# Patient Record
Sex: MALE | Race: WHITE | Employment: OTHER | URBAN - METROPOLITAN AREA
[De-identification: names, ages, dates, MRNs, and addresses within clinical notes are randomized per-mention and may not be internally consistent; named-entity substitution may affect disease eponyms.]

---

## 2017-01-01 ENCOUNTER — HOSPITAL ENCOUNTER (OUTPATIENT)
Dept: GENERAL RADIOLOGY | Age: 67
Discharge: HOME | End: 2017-12-20
Attending: SURGERY
Payer: MEDICARE

## 2017-01-01 ENCOUNTER — HOSPITAL ENCOUNTER (OUTPATIENT)
Dept: CT IMAGING | Age: 67
Discharge: HOME | End: 2017-11-16
Attending: INTERNAL MEDICINE
Payer: MEDICARE

## 2017-01-01 ENCOUNTER — HOSPITAL ENCOUNTER (OUTPATIENT)
Dept: INFUSION THERAPY | Age: 67
Discharge: HOME | End: 2017-09-14
Payer: MEDICARE

## 2017-01-01 ENCOUNTER — TELEPHONE (OUTPATIENT)
Dept: SURGERY | Age: 67
End: 2017-01-01

## 2017-01-01 ENCOUNTER — HOSPITAL ENCOUNTER (OUTPATIENT)
Dept: INFUSION THERAPY | Age: 67
Discharge: HOME | End: 2017-10-26
Payer: MEDICARE

## 2017-01-01 ENCOUNTER — OFFICE VISIT (OUTPATIENT)
Dept: ONCOLOGY | Age: 67
End: 2017-01-01

## 2017-01-01 ENCOUNTER — HOSPITAL ENCOUNTER (OUTPATIENT)
Dept: INFUSION THERAPY | Age: 67
Discharge: HOME | End: 2017-09-26
Payer: MEDICARE

## 2017-01-01 ENCOUNTER — HOSPITAL ENCOUNTER (OUTPATIENT)
Dept: INFUSION THERAPY | Age: 67
Discharge: HOME | End: 2017-11-06
Payer: MEDICARE

## 2017-01-01 ENCOUNTER — HOSPITAL ENCOUNTER (OUTPATIENT)
Dept: CT IMAGING | Age: 67
Discharge: HOME | End: 2017-07-17
Attending: OTOLARYNGOLOGY
Payer: MEDICARE

## 2017-01-01 ENCOUNTER — HOSPITAL ENCOUNTER (OUTPATIENT)
Dept: INFUSION THERAPY | Age: 67
End: 2017-01-01
Payer: MEDICARE

## 2017-01-01 ENCOUNTER — APPOINTMENT (OUTPATIENT)
Dept: INFUSION THERAPY | Age: 67
End: 2017-01-01
Payer: MEDICARE

## 2017-01-01 ENCOUNTER — HOSPITAL ENCOUNTER (OUTPATIENT)
Dept: INFUSION THERAPY | Age: 67
Discharge: HOME | End: 2017-10-12
Payer: MEDICARE

## 2017-01-01 ENCOUNTER — HOSPITAL ENCOUNTER (OUTPATIENT)
Dept: INFUSION THERAPY | Age: 67
Discharge: HOME | End: 2017-09-12
Payer: MEDICARE

## 2017-01-01 ENCOUNTER — APPOINTMENT (OUTPATIENT)
Dept: INFUSION THERAPY | Age: 67
End: 2017-01-01

## 2017-01-01 ENCOUNTER — HOSPITAL ENCOUNTER (OUTPATIENT)
Dept: GENERAL RADIOLOGY | Age: 67
Discharge: HOME | End: 2017-03-23
Attending: UROLOGY
Payer: MEDICARE

## 2017-01-01 ENCOUNTER — ANESTHESIA EVENT (OUTPATIENT)
Dept: SURGERY | Age: 67
DRG: 330 | End: 2017-01-01
Payer: MEDICARE

## 2017-01-01 ENCOUNTER — HOSPITAL ENCOUNTER (OUTPATIENT)
Dept: INFUSION THERAPY | Age: 67
Discharge: HOME | End: 2017-09-25
Payer: MEDICARE

## 2017-01-01 ENCOUNTER — DOCUMENTATION ONLY (OUTPATIENT)
Dept: SURGERY | Age: 67
End: 2017-01-01

## 2017-01-01 ENCOUNTER — APPOINTMENT (OUTPATIENT)
Dept: CT IMAGING | Age: 67
DRG: 330 | End: 2017-01-01
Attending: EMERGENCY MEDICINE
Payer: MEDICARE

## 2017-01-01 ENCOUNTER — TELEPHONE (OUTPATIENT)
Dept: CASE MANAGEMENT | Age: 67
End: 2017-01-01

## 2017-01-01 ENCOUNTER — HOSPITAL ENCOUNTER (OUTPATIENT)
Dept: INFUSION THERAPY | Age: 67
Discharge: HOME | End: 2017-09-11
Payer: MEDICARE

## 2017-01-01 ENCOUNTER — HOSPITAL ENCOUNTER (OUTPATIENT)
Dept: INFUSION THERAPY | Age: 67
Discharge: HOME | End: 2017-09-01
Payer: MEDICARE

## 2017-01-01 ENCOUNTER — HOSPITAL ENCOUNTER (EMERGENCY)
Age: 67
Discharge: HOME | End: 2017-08-03
Attending: EMERGENCY MEDICINE | Admitting: EMERGENCY MEDICINE
Payer: MEDICARE

## 2017-01-01 ENCOUNTER — APPOINTMENT (OUTPATIENT)
Dept: GENERAL RADIOLOGY | Age: 67
DRG: 330 | End: 2017-01-01
Attending: SURGERY
Payer: MEDICARE

## 2017-01-01 ENCOUNTER — HOSPITAL ENCOUNTER (OUTPATIENT)
Dept: INTERVENTIONAL RADIOLOGY/VASCULAR | Age: 67
Discharge: HOME | End: 2017-08-25
Attending: INTERNAL MEDICINE | Admitting: INTERNAL MEDICINE
Payer: MEDICARE

## 2017-01-01 ENCOUNTER — HOSPITAL ENCOUNTER (EMERGENCY)
Age: 67
Discharge: HOME | End: 2017-04-13
Attending: EMERGENCY MEDICINE | Admitting: EMERGENCY MEDICINE
Payer: MEDICARE

## 2017-01-01 ENCOUNTER — NURSE NAVIGATOR (OUTPATIENT)
Dept: CASE MANAGEMENT | Age: 67
End: 2017-01-01

## 2017-01-01 ENCOUNTER — HOSPITAL ENCOUNTER (INPATIENT)
Age: 67
LOS: 1 days | Discharge: HOME | DRG: 708 | End: 2017-04-06
Attending: UROLOGY | Admitting: UROLOGY
Payer: MEDICARE

## 2017-01-01 ENCOUNTER — OFFICE VISIT (OUTPATIENT)
Dept: SURGERY | Age: 67
End: 2017-01-01

## 2017-01-01 ENCOUNTER — HOSPITAL ENCOUNTER (INPATIENT)
Age: 67
LOS: 7 days | Discharge: HOME | DRG: 330 | End: 2017-08-01
Attending: EMERGENCY MEDICINE | Admitting: SURGERY
Payer: MEDICARE

## 2017-01-01 ENCOUNTER — HOSPITAL ENCOUNTER (OUTPATIENT)
Dept: INFUSION THERAPY | Age: 67
Discharge: HOME | End: 2017-10-09
Payer: MEDICARE

## 2017-01-01 ENCOUNTER — ANESTHESIA (OUTPATIENT)
Dept: SURGERY | Age: 67
DRG: 330 | End: 2017-01-01
Payer: MEDICARE

## 2017-01-01 ENCOUNTER — HOSPITAL ENCOUNTER (OUTPATIENT)
Dept: CT IMAGING | Age: 67
Discharge: HOME | End: 2017-03-16
Attending: SURGERY
Payer: MEDICARE

## 2017-01-01 ENCOUNTER — HOSPITAL ENCOUNTER (OUTPATIENT)
Dept: INFUSION THERAPY | Age: 67
Discharge: HOME | End: 2017-10-24
Payer: MEDICARE

## 2017-01-01 ENCOUNTER — ANESTHESIA (OUTPATIENT)
Dept: SURGERY | Age: 67
DRG: 406 | End: 2017-01-01
Payer: MEDICARE

## 2017-01-01 ENCOUNTER — TELEPHONE (OUTPATIENT)
Dept: ONCOLOGY | Age: 67
End: 2017-01-01

## 2017-01-01 ENCOUNTER — APPOINTMENT (OUTPATIENT)
Dept: CT IMAGING | Age: 67
End: 2017-01-01
Attending: INTERNAL MEDICINE
Payer: MEDICARE

## 2017-01-01 ENCOUNTER — APPOINTMENT (OUTPATIENT)
Dept: CT IMAGING | Age: 67
End: 2017-01-01
Attending: EMERGENCY MEDICINE
Payer: MEDICARE

## 2017-01-01 ENCOUNTER — HOSPITAL ENCOUNTER (OUTPATIENT)
Dept: INFUSION THERAPY | Age: 67
Discharge: HOME | End: 2017-10-23
Payer: MEDICARE

## 2017-01-01 ENCOUNTER — HOSPITAL ENCOUNTER (OUTPATIENT)
Dept: INFUSION THERAPY | Age: 67
Discharge: HOME | End: 2017-11-09
Payer: MEDICARE

## 2017-01-01 ENCOUNTER — ANESTHESIA EVENT (OUTPATIENT)
Dept: SURGERY | Age: 67
DRG: 708 | End: 2017-01-01
Payer: MEDICARE

## 2017-01-01 ENCOUNTER — HOSPITAL ENCOUNTER (INPATIENT)
Age: 67
LOS: 1 days | Discharge: HOME | DRG: 406 | End: 2017-12-29
Attending: SURGERY | Admitting: SURGERY
Payer: MEDICARE

## 2017-01-01 ENCOUNTER — ANESTHESIA (OUTPATIENT)
Dept: SURGERY | Age: 67
DRG: 708 | End: 2017-01-01
Payer: MEDICARE

## 2017-01-01 ENCOUNTER — APPOINTMENT (OUTPATIENT)
Dept: GENERAL RADIOLOGY | Age: 67
DRG: 330 | End: 2017-01-01
Attending: EMERGENCY MEDICINE
Payer: MEDICARE

## 2017-01-01 ENCOUNTER — HOSPITAL ENCOUNTER (OUTPATIENT)
Dept: PREADMISSION TESTING | Age: 67
Discharge: HOME | End: 2017-03-23
Attending: UROLOGY
Payer: MEDICARE

## 2017-01-01 ENCOUNTER — HOSPITAL ENCOUNTER (OUTPATIENT)
Dept: INFUSION THERAPY | Age: 67
Discharge: HOME | End: 2017-08-30
Payer: MEDICARE

## 2017-01-01 ENCOUNTER — HOSPITAL ENCOUNTER (OUTPATIENT)
Dept: INFUSION THERAPY | Age: 67
Discharge: HOME | End: 2017-10-10
Payer: MEDICARE

## 2017-01-01 ENCOUNTER — ANESTHESIA EVENT (OUTPATIENT)
Dept: SURGERY | Age: 67
DRG: 406 | End: 2017-01-01
Payer: MEDICARE

## 2017-01-01 ENCOUNTER — HOSPITAL ENCOUNTER (OUTPATIENT)
Dept: INFUSION THERAPY | Age: 67
Discharge: HOME | End: 2017-09-28
Payer: MEDICARE

## 2017-01-01 ENCOUNTER — HOSPITAL ENCOUNTER (OUTPATIENT)
Dept: INFUSION THERAPY | Age: 67
Discharge: HOME | End: 2017-11-07
Payer: MEDICARE

## 2017-01-01 ENCOUNTER — HOSPITAL ENCOUNTER (OUTPATIENT)
Dept: PREADMISSION TESTING | Age: 67
Discharge: HOME | End: 2017-12-20
Attending: SURGERY
Payer: MEDICARE

## 2017-01-01 VITALS
DIASTOLIC BLOOD PRESSURE: 83 MMHG | RESPIRATION RATE: 18 BRPM | TEMPERATURE: 98.1 F | HEART RATE: 85 BPM | SYSTOLIC BLOOD PRESSURE: 126 MMHG | BODY MASS INDEX: 28.52 KG/M2 | WEIGHT: 198.8 LBS

## 2017-01-01 VITALS
RESPIRATION RATE: 18 BRPM | WEIGHT: 206.4 LBS | DIASTOLIC BLOOD PRESSURE: 78 MMHG | SYSTOLIC BLOOD PRESSURE: 130 MMHG | HEART RATE: 85 BPM | OXYGEN SATURATION: 94 % | BODY MASS INDEX: 29.62 KG/M2 | TEMPERATURE: 98.1 F

## 2017-01-01 VITALS
DIASTOLIC BLOOD PRESSURE: 74 MMHG | TEMPERATURE: 97.6 F | SYSTOLIC BLOOD PRESSURE: 123 MMHG | RESPIRATION RATE: 18 BRPM | HEART RATE: 60 BPM

## 2017-01-01 VITALS
OXYGEN SATURATION: 93 % | DIASTOLIC BLOOD PRESSURE: 82 MMHG | SYSTOLIC BLOOD PRESSURE: 135 MMHG | HEIGHT: 70 IN | WEIGHT: 204.81 LBS | TEMPERATURE: 97.5 F | BODY MASS INDEX: 29.32 KG/M2 | RESPIRATION RATE: 18 BRPM | HEART RATE: 78 BPM

## 2017-01-01 VITALS
BODY MASS INDEX: 28.77 KG/M2 | HEIGHT: 70 IN | HEART RATE: 63 BPM | SYSTOLIC BLOOD PRESSURE: 157 MMHG | WEIGHT: 201 LBS | RESPIRATION RATE: 20 BRPM | DIASTOLIC BLOOD PRESSURE: 82 MMHG | OXYGEN SATURATION: 96 % | TEMPERATURE: 98 F

## 2017-01-01 VITALS
HEART RATE: 77 BPM | WEIGHT: 197 LBS | OXYGEN SATURATION: 96 % | TEMPERATURE: 97.8 F | SYSTOLIC BLOOD PRESSURE: 108 MMHG | HEIGHT: 70 IN | BODY MASS INDEX: 28.2 KG/M2 | RESPIRATION RATE: 20 BRPM | DIASTOLIC BLOOD PRESSURE: 71 MMHG

## 2017-01-01 VITALS
DIASTOLIC BLOOD PRESSURE: 64 MMHG | BODY MASS INDEX: 29.2 KG/M2 | HEIGHT: 70 IN | SYSTOLIC BLOOD PRESSURE: 125 MMHG | OXYGEN SATURATION: 92 % | HEART RATE: 80 BPM | WEIGHT: 204 LBS

## 2017-01-01 VITALS
RESPIRATION RATE: 18 BRPM | BODY MASS INDEX: 30.58 KG/M2 | HEIGHT: 70 IN | OXYGEN SATURATION: 95 % | WEIGHT: 213.63 LBS | DIASTOLIC BLOOD PRESSURE: 61 MMHG | SYSTOLIC BLOOD PRESSURE: 132 MMHG | TEMPERATURE: 98 F | HEART RATE: 67 BPM

## 2017-01-01 VITALS
HEART RATE: 70 BPM | SYSTOLIC BLOOD PRESSURE: 107 MMHG | OXYGEN SATURATION: 96 % | RESPIRATION RATE: 18 BRPM | DIASTOLIC BLOOD PRESSURE: 70 MMHG | TEMPERATURE: 98 F

## 2017-01-01 VITALS
DIASTOLIC BLOOD PRESSURE: 73 MMHG | SYSTOLIC BLOOD PRESSURE: 115 MMHG | OXYGEN SATURATION: 97 % | TEMPERATURE: 98.1 F | RESPIRATION RATE: 18 BRPM | HEART RATE: 66 BPM

## 2017-01-01 VITALS
RESPIRATION RATE: 20 BRPM | HEART RATE: 69 BPM | TEMPERATURE: 97.7 F | HEIGHT: 70 IN | OXYGEN SATURATION: 97 % | WEIGHT: 197.4 LBS | SYSTOLIC BLOOD PRESSURE: 127 MMHG | DIASTOLIC BLOOD PRESSURE: 79 MMHG | BODY MASS INDEX: 28.26 KG/M2

## 2017-01-01 VITALS
SYSTOLIC BLOOD PRESSURE: 134 MMHG | RESPIRATION RATE: 18 BRPM | HEART RATE: 72 BPM | DIASTOLIC BLOOD PRESSURE: 84 MMHG | TEMPERATURE: 98.6 F | OXYGEN SATURATION: 97 %

## 2017-01-01 VITALS
TEMPERATURE: 98.7 F | HEIGHT: 69 IN | RESPIRATION RATE: 18 BRPM | DIASTOLIC BLOOD PRESSURE: 84 MMHG | BODY MASS INDEX: 28.97 KG/M2 | SYSTOLIC BLOOD PRESSURE: 146 MMHG | HEART RATE: 83 BPM | OXYGEN SATURATION: 95 % | WEIGHT: 195.6 LBS

## 2017-01-01 VITALS
BODY MASS INDEX: 29.48 KG/M2 | RESPIRATION RATE: 16 BRPM | OXYGEN SATURATION: 95 % | TEMPERATURE: 97.5 F | SYSTOLIC BLOOD PRESSURE: 150 MMHG | WEIGHT: 205.91 LBS | HEART RATE: 74 BPM | DIASTOLIC BLOOD PRESSURE: 95 MMHG | HEIGHT: 70 IN

## 2017-01-01 VITALS
SYSTOLIC BLOOD PRESSURE: 110 MMHG | DIASTOLIC BLOOD PRESSURE: 74 MMHG | TEMPERATURE: 97.7 F | RESPIRATION RATE: 16 BRPM | HEART RATE: 79 BPM | OXYGEN SATURATION: 94 % | HEIGHT: 70 IN | WEIGHT: 205.4 LBS | BODY MASS INDEX: 29.41 KG/M2

## 2017-01-01 VITALS
RESPIRATION RATE: 18 BRPM | DIASTOLIC BLOOD PRESSURE: 80 MMHG | TEMPERATURE: 98.6 F | OXYGEN SATURATION: 95 % | SYSTOLIC BLOOD PRESSURE: 136 MMHG | HEART RATE: 85 BPM | BODY MASS INDEX: 29.25 KG/M2 | HEIGHT: 69 IN | WEIGHT: 197.5 LBS

## 2017-01-01 VITALS
DIASTOLIC BLOOD PRESSURE: 88 MMHG | TEMPERATURE: 98.3 F | OXYGEN SATURATION: 98 % | HEART RATE: 77 BPM | HEIGHT: 70 IN | BODY MASS INDEX: 28.75 KG/M2 | SYSTOLIC BLOOD PRESSURE: 146 MMHG | RESPIRATION RATE: 18 BRPM | WEIGHT: 200.8 LBS

## 2017-01-01 VITALS
HEIGHT: 70 IN | BODY MASS INDEX: 29.55 KG/M2 | DIASTOLIC BLOOD PRESSURE: 69 MMHG | DIASTOLIC BLOOD PRESSURE: 85 MMHG | OXYGEN SATURATION: 94 % | WEIGHT: 201.8 LBS | SYSTOLIC BLOOD PRESSURE: 139 MMHG | OXYGEN SATURATION: 95 % | BODY MASS INDEX: 28.89 KG/M2 | TEMPERATURE: 98.1 F | RESPIRATION RATE: 16 BRPM | HEART RATE: 73 BPM | RESPIRATION RATE: 18 BRPM | TEMPERATURE: 98.6 F | SYSTOLIC BLOOD PRESSURE: 139 MMHG | WEIGHT: 206.4 LBS | HEART RATE: 73 BPM | HEIGHT: 70 IN

## 2017-01-01 VITALS
HEIGHT: 70 IN | BODY MASS INDEX: 32.13 KG/M2 | OXYGEN SATURATION: 88 % | TEMPERATURE: 98.4 F | DIASTOLIC BLOOD PRESSURE: 83 MMHG | RESPIRATION RATE: 20 BRPM | WEIGHT: 224.4 LBS | HEART RATE: 92 BPM | SYSTOLIC BLOOD PRESSURE: 137 MMHG

## 2017-01-01 VITALS
WEIGHT: 218.26 LBS | HEART RATE: 79 BPM | TEMPERATURE: 98.4 F | OXYGEN SATURATION: 100 % | HEIGHT: 70 IN | DIASTOLIC BLOOD PRESSURE: 65 MMHG | RESPIRATION RATE: 18 BRPM | SYSTOLIC BLOOD PRESSURE: 150 MMHG | BODY MASS INDEX: 31.25 KG/M2

## 2017-01-01 VITALS
HEART RATE: 73 BPM | HEIGHT: 70 IN | OXYGEN SATURATION: 95 % | BODY MASS INDEX: 28.4 KG/M2 | RESPIRATION RATE: 18 BRPM | DIASTOLIC BLOOD PRESSURE: 82 MMHG | SYSTOLIC BLOOD PRESSURE: 175 MMHG | TEMPERATURE: 97.7 F | WEIGHT: 198.4 LBS

## 2017-01-01 VITALS
HEART RATE: 94 BPM | HEIGHT: 70 IN | DIASTOLIC BLOOD PRESSURE: 82 MMHG | BODY MASS INDEX: 32.11 KG/M2 | OXYGEN SATURATION: 94 % | SYSTOLIC BLOOD PRESSURE: 154 MMHG | RESPIRATION RATE: 16 BRPM | TEMPERATURE: 98 F | WEIGHT: 224.3 LBS

## 2017-01-01 VITALS
WEIGHT: 197.2 LBS | BODY MASS INDEX: 28.3 KG/M2 | TEMPERATURE: 98 F | DIASTOLIC BLOOD PRESSURE: 87 MMHG | SYSTOLIC BLOOD PRESSURE: 148 MMHG | HEART RATE: 89 BPM | RESPIRATION RATE: 18 BRPM

## 2017-01-01 VITALS
HEART RATE: 68 BPM | OXYGEN SATURATION: 97 % | WEIGHT: 197 LBS | RESPIRATION RATE: 18 BRPM | DIASTOLIC BLOOD PRESSURE: 81 MMHG | HEIGHT: 68 IN | TEMPERATURE: 98.5 F | SYSTOLIC BLOOD PRESSURE: 132 MMHG | BODY MASS INDEX: 29.86 KG/M2

## 2017-01-01 VITALS
BODY MASS INDEX: 29.09 KG/M2 | TEMPERATURE: 97.8 F | RESPIRATION RATE: 18 BRPM | WEIGHT: 196.43 LBS | HEART RATE: 75 BPM | OXYGEN SATURATION: 95 % | SYSTOLIC BLOOD PRESSURE: 170 MMHG | HEIGHT: 69 IN | DIASTOLIC BLOOD PRESSURE: 83 MMHG

## 2017-01-01 VITALS
HEIGHT: 70 IN | DIASTOLIC BLOOD PRESSURE: 75 MMHG | SYSTOLIC BLOOD PRESSURE: 116 MMHG | WEIGHT: 209.2 LBS | TEMPERATURE: 98.3 F | BODY MASS INDEX: 29.95 KG/M2 | HEART RATE: 90 BPM | OXYGEN SATURATION: 94 % | RESPIRATION RATE: 16 BRPM

## 2017-01-01 VITALS
DIASTOLIC BLOOD PRESSURE: 77 MMHG | TEMPERATURE: 97.9 F | SYSTOLIC BLOOD PRESSURE: 124 MMHG | HEART RATE: 89 BPM | OXYGEN SATURATION: 93 % | RESPIRATION RATE: 18 BRPM

## 2017-01-01 VITALS
WEIGHT: 201.8 LBS | BODY MASS INDEX: 28.96 KG/M2 | RESPIRATION RATE: 18 BRPM | HEART RATE: 81 BPM | SYSTOLIC BLOOD PRESSURE: 120 MMHG | OXYGEN SATURATION: 96 % | TEMPERATURE: 97.9 F | DIASTOLIC BLOOD PRESSURE: 73 MMHG

## 2017-01-01 VITALS
RESPIRATION RATE: 18 BRPM | SYSTOLIC BLOOD PRESSURE: 138 MMHG | BODY MASS INDEX: 28.23 KG/M2 | HEART RATE: 73 BPM | WEIGHT: 197.2 LBS | HEIGHT: 70 IN | DIASTOLIC BLOOD PRESSURE: 77 MMHG | OXYGEN SATURATION: 94 %

## 2017-01-01 VITALS — TEMPERATURE: 98 F | HEART RATE: 73 BPM | DIASTOLIC BLOOD PRESSURE: 85 MMHG | SYSTOLIC BLOOD PRESSURE: 138 MMHG

## 2017-01-01 VITALS
HEART RATE: 63 BPM | OXYGEN SATURATION: 97 % | DIASTOLIC BLOOD PRESSURE: 76 MMHG | RESPIRATION RATE: 18 BRPM | SYSTOLIC BLOOD PRESSURE: 121 MMHG | TEMPERATURE: 97.5 F

## 2017-01-01 VITALS
SYSTOLIC BLOOD PRESSURE: 144 MMHG | DIASTOLIC BLOOD PRESSURE: 82 MMHG | HEART RATE: 78 BPM | OXYGEN SATURATION: 96 % | HEIGHT: 70 IN | BODY MASS INDEX: 30.49 KG/M2 | RESPIRATION RATE: 20 BRPM | TEMPERATURE: 98.1 F | WEIGHT: 213 LBS

## 2017-01-01 DIAGNOSIS — C18.0 ADENOCARCINOMA OF CECUM STAGE, IVB (HCC): Primary | ICD-10-CM

## 2017-01-01 DIAGNOSIS — D64.81 ANEMIA ASSOCIATED WITH CHEMOTHERAPY: ICD-10-CM

## 2017-01-01 DIAGNOSIS — R53.83 OTHER FATIGUE: ICD-10-CM

## 2017-01-01 DIAGNOSIS — R19.04 ABDOMINAL OR PELVIC SWELLING, MASS, OR LUMP, LEFT LOWER QUADRANT: ICD-10-CM

## 2017-01-01 DIAGNOSIS — T45.1X5A CHEMOTHERAPY-INDUCED NEUTROPENIA (HCC): ICD-10-CM

## 2017-01-01 DIAGNOSIS — C18.0 ADENOCARCINOMA OF CECUM STAGE, IVB (HCC): ICD-10-CM

## 2017-01-01 DIAGNOSIS — D70.1 CHEMOTHERAPY-INDUCED NEUTROPENIA (HCC): ICD-10-CM

## 2017-01-01 DIAGNOSIS — K56.609 SBO (SMALL BOWEL OBSTRUCTION) (HCC): Primary | ICD-10-CM

## 2017-01-01 DIAGNOSIS — T45.1X5A ANEMIA ASSOCIATED WITH CHEMOTHERAPY: ICD-10-CM

## 2017-01-01 DIAGNOSIS — R10.9 FLANK PAIN: Primary | ICD-10-CM

## 2017-01-01 DIAGNOSIS — C78.7 COLON CANCER METASTASIZED TO LIVER (HCC): Primary | ICD-10-CM

## 2017-01-01 DIAGNOSIS — Z97.8 FOLEY CATHETER IN PLACE: ICD-10-CM

## 2017-01-01 DIAGNOSIS — K59.03 DRUG-INDUCED CONSTIPATION: ICD-10-CM

## 2017-01-01 DIAGNOSIS — C18.9 COLON CANCER METASTASIZED TO LIVER (HCC): Primary | ICD-10-CM

## 2017-01-01 DIAGNOSIS — R33.9 URINARY RETENTION: Primary | ICD-10-CM

## 2017-01-01 DIAGNOSIS — R19.04 ABDOMINAL OR PELVIC SWELLING, MASS, OR LUMP, LEFT LOWER QUADRANT: Primary | ICD-10-CM

## 2017-01-01 DIAGNOSIS — Z09 CHEMOTHERAPY FOLLOW-UP EXAMINATION: ICD-10-CM

## 2017-01-01 DIAGNOSIS — C06.2 MALIGNANT NEOPLASM OF RETROMOLAR AREA (HCC): ICD-10-CM

## 2017-01-01 LAB
ABO + RH BLD: NORMAL
ABO + RH BLD: NORMAL
ALBUMIN SERPL BCP-MCNC: 3 G/DL (ref 3.5–5)
ALBUMIN SERPL BCP-MCNC: 3 G/DL (ref 3.5–5)
ALBUMIN SERPL BCP-MCNC: 3.6 G/DL (ref 3.5–5)
ALBUMIN SERPL BCP-MCNC: 4 G/DL (ref 3.5–5)
ALBUMIN SERPL BCP-MCNC: 4 G/DL (ref 3.5–5)
ALBUMIN SERPL BCP-MCNC: 4.2 G/DL (ref 3.5–5)
ALBUMIN SERPL-MCNC: 3.7 G/DL (ref 3.5–5)
ALBUMIN SERPL-MCNC: 3.8 G/DL (ref 3.5–5)
ALBUMIN SERPL-MCNC: 3.8 G/DL (ref 3.5–5)
ALBUMIN SERPL-MCNC: 3.9 G/DL (ref 3.5–5)
ALBUMIN SERPL-MCNC: 3.9 G/DL (ref 3.5–5)
ALBUMIN SERPL-MCNC: 4.2 G/DL (ref 3.5–5)
ALBUMIN/GLOB SERPL: 0.8 {RATIO} (ref 1.1–2.2)
ALBUMIN/GLOB SERPL: 0.9 {RATIO} (ref 1.1–2.2)
ALBUMIN/GLOB SERPL: 1 {RATIO} (ref 1.1–2.2)
ALBUMIN/GLOB SERPL: 1.1 {RATIO} (ref 1.1–2.2)
ALBUMIN/GLOB SERPL: 1.1 {RATIO} (ref 1.1–2.2)
ALBUMIN/GLOB SERPL: 1.2 {RATIO} (ref 1.1–2.2)
ALBUMIN/GLOB SERPL: 1.2 {RATIO} (ref 1.1–2.2)
ALP SERPL-CCNC: 42 U/L (ref 45–117)
ALP SERPL-CCNC: 46 U/L (ref 45–117)
ALP SERPL-CCNC: 54 U/L (ref 45–117)
ALP SERPL-CCNC: 55 U/L (ref 45–117)
ALP SERPL-CCNC: 56 U/L (ref 45–117)
ALP SERPL-CCNC: 58 U/L (ref 45–117)
ALP SERPL-CCNC: 58 U/L (ref 45–117)
ALP SERPL-CCNC: 60 U/L (ref 45–117)
ALP SERPL-CCNC: 67 U/L (ref 45–117)
ALP SERPL-CCNC: 70 U/L (ref 45–117)
ALP SERPL-CCNC: 74 U/L (ref 45–117)
ALP SERPL-CCNC: 77 U/L (ref 45–117)
ALT SERPL-CCNC: 28 U/L (ref 12–78)
ALT SERPL-CCNC: 29 U/L (ref 12–78)
ALT SERPL-CCNC: 32 U/L (ref 12–78)
ALT SERPL-CCNC: 35 U/L (ref 12–78)
ALT SERPL-CCNC: 37 U/L (ref 12–78)
ALT SERPL-CCNC: 39 U/L (ref 12–78)
ALT SERPL-CCNC: 396 U/L (ref 12–78)
ALT SERPL-CCNC: 40 U/L (ref 12–78)
ALT SERPL-CCNC: 40 U/L (ref 12–78)
ALT SERPL-CCNC: 42 U/L (ref 12–78)
ALT SERPL-CCNC: 43 U/L (ref 12–78)
ALT SERPL-CCNC: 67 U/L (ref 12–78)
ANION GAP BLD CALC-SCNC: 13 MMOL/L (ref 5–15)
ANION GAP BLD CALC-SCNC: 6 MMOL/L (ref 5–15)
ANION GAP BLD CALC-SCNC: 7 MMOL/L (ref 5–15)
ANION GAP BLD CALC-SCNC: 8 MMOL/L (ref 5–15)
ANION GAP BLD CALC-SCNC: 8 MMOL/L (ref 5–15)
ANION GAP SERPL CALC-SCNC: 10 MMOL/L (ref 5–15)
ANION GAP SERPL CALC-SCNC: 6 MMOL/L (ref 5–15)
ANION GAP SERPL CALC-SCNC: 7 MMOL/L (ref 5–15)
ANION GAP SERPL CALC-SCNC: 7 MMOL/L (ref 5–15)
ANION GAP SERPL CALC-SCNC: 8 MMOL/L (ref 5–15)
APPEARANCE UR: ABNORMAL
APPEARANCE UR: ABNORMAL
APPEARANCE UR: CLEAR
APTT PPP: 26.1 SEC (ref 22.1–32.5)
APTT PPP: 26.4 SEC (ref 22.1–32.5)
AST SERPL W P-5'-P-CCNC: 22 U/L (ref 15–37)
AST SERPL W P-5'-P-CCNC: 22 U/L (ref 15–37)
AST SERPL W P-5'-P-CCNC: 26 U/L (ref 15–37)
AST SERPL W P-5'-P-CCNC: 29 U/L (ref 15–37)
AST SERPL W P-5'-P-CCNC: 32 U/L (ref 15–37)
AST SERPL W P-5'-P-CCNC: 41 U/L (ref 15–37)
AST SERPL-CCNC: 27 U/L (ref 15–37)
AST SERPL-CCNC: 36 U/L (ref 15–37)
AST SERPL-CCNC: 42 U/L (ref 15–37)
AST SERPL-CCNC: 42 U/L (ref 15–37)
AST SERPL-CCNC: 426 U/L (ref 15–37)
AST SERPL-CCNC: 44 U/L (ref 15–37)
ATRIAL RATE: 74 BPM
ATRIAL RATE: 82 BPM
ATRIAL RATE: 88 BPM
BACTERIA SPEC CULT: NORMAL
BACTERIA SPEC CULT: NORMAL
BACTERIA URNS QL MICRO: NEGATIVE /HPF
BASO+EOS+MONOS # BLD AUTO: 0.8 K/UL (ref 0.2–1.2)
BASO+EOS+MONOS # BLD AUTO: 12 % (ref 3.2–16.9)
BASOPHILS # BLD AUTO: 0 K/UL
BASOPHILS # BLD AUTO: 0 K/UL (ref 0–0.1)
BASOPHILS # BLD: 0 %
BASOPHILS # BLD: 0 % (ref 0–1)
BASOPHILS # BLD: 0 K/UL
BASOPHILS # BLD: 0 K/UL (ref 0–0.1)
BASOPHILS # BLD: 1 % (ref 0–1)
BASOPHILS NFR BLD: 0 %
BASOPHILS NFR BLD: 0 % (ref 0–1)
BASOPHILS NFR BLD: 1 % (ref 0–1)
BILIRUB DIRECT SERPL-MCNC: <0.1 MG/DL (ref 0–0.2)
BILIRUB SERPL-MCNC: 0.3 MG/DL (ref 0.2–1)
BILIRUB SERPL-MCNC: 0.3 MG/DL (ref 0.2–1)
BILIRUB SERPL-MCNC: 0.4 MG/DL (ref 0.2–1)
BILIRUB SERPL-MCNC: 0.5 MG/DL (ref 0.2–1)
BILIRUB SERPL-MCNC: 0.6 MG/DL (ref 0.2–1)
BILIRUB SERPL-MCNC: 0.7 MG/DL (ref 0.2–1)
BILIRUB SERPL-MCNC: 1 MG/DL (ref 0.2–1)
BILIRUB UR QL: NEGATIVE
BLOOD GROUP ANTIBODIES SERPL: NORMAL
BLOOD GROUP ANTIBODIES SERPL: NORMAL
BUN SERPL-MCNC: 11 MG/DL (ref 6–20)
BUN SERPL-MCNC: 12 MG/DL (ref 6–20)
BUN SERPL-MCNC: 12 MG/DL (ref 6–20)
BUN SERPL-MCNC: 14 MG/DL (ref 6–20)
BUN SERPL-MCNC: 15 MG/DL (ref 6–20)
BUN SERPL-MCNC: 16 MG/DL (ref 6–20)
BUN SERPL-MCNC: 17 MG/DL (ref 6–20)
BUN SERPL-MCNC: 21 MG/DL (ref 6–20)
BUN SERPL-MCNC: 21 MG/DL (ref 6–20)
BUN SERPL-MCNC: 22 MG/DL (ref 6–20)
BUN SERPL-MCNC: 9 MG/DL (ref 6–20)
BUN/CREAT SERPL: 10 (ref 12–20)
BUN/CREAT SERPL: 10 (ref 12–20)
BUN/CREAT SERPL: 11 (ref 12–20)
BUN/CREAT SERPL: 11 (ref 12–20)
BUN/CREAT SERPL: 13 (ref 12–20)
BUN/CREAT SERPL: 13 (ref 12–20)
BUN/CREAT SERPL: 14 (ref 12–20)
BUN/CREAT SERPL: 15 (ref 12–20)
BUN/CREAT SERPL: 16 (ref 12–20)
BUN/CREAT SERPL: 16 (ref 12–20)
BUN/CREAT SERPL: 19 (ref 12–20)
BUN/CREAT SERPL: 20 (ref 12–20)
BUN/CREAT SERPL: 21 (ref 12–20)
CALCIUM SERPL-MCNC: 7.6 MG/DL (ref 8.5–10.1)
CALCIUM SERPL-MCNC: 8.2 MG/DL (ref 8.5–10.1)
CALCIUM SERPL-MCNC: 8.4 MG/DL (ref 8.5–10.1)
CALCIUM SERPL-MCNC: 8.5 MG/DL (ref 8.5–10.1)
CALCIUM SERPL-MCNC: 8.8 MG/DL (ref 8.5–10.1)
CALCIUM SERPL-MCNC: 8.9 MG/DL (ref 8.5–10.1)
CALCIUM SERPL-MCNC: 9 MG/DL (ref 8.5–10.1)
CALCIUM SERPL-MCNC: 9 MG/DL (ref 8.5–10.1)
CALCIUM SERPL-MCNC: 9.2 MG/DL (ref 8.5–10.1)
CALCIUM SERPL-MCNC: 9.3 MG/DL (ref 8.5–10.1)
CALCIUM SERPL-MCNC: 9.4 MG/DL (ref 8.5–10.1)
CALCULATED P AXIS, ECG09: 10 DEGREES
CALCULATED P AXIS, ECG09: 44 DEGREES
CALCULATED P AXIS, ECG09: 45 DEGREES
CALCULATED R AXIS, ECG10: -57 DEGREES
CALCULATED R AXIS, ECG10: -63 DEGREES
CALCULATED R AXIS, ECG10: -67 DEGREES
CALCULATED T AXIS, ECG11: -5 DEGREES
CALCULATED T AXIS, ECG11: 6 DEGREES
CALCULATED T AXIS, ECG11: 8 DEGREES
CC UR VC: NORMAL
CC UR VC: NORMAL
CEA SERPL-MCNC: 0.9 NG/ML
CEA SERPL-MCNC: <0.5 NG/ML
CHLORIDE SERPL-SCNC: 101 MMOL/L (ref 97–108)
CHLORIDE SERPL-SCNC: 102 MMOL/L (ref 97–108)
CHLORIDE SERPL-SCNC: 103 MMOL/L (ref 97–108)
CHLORIDE SERPL-SCNC: 104 MMOL/L (ref 97–108)
CHLORIDE SERPL-SCNC: 105 MMOL/L (ref 97–108)
CHLORIDE SERPL-SCNC: 106 MMOL/L (ref 97–108)
CHLORIDE SERPL-SCNC: 109 MMOL/L (ref 97–108)
CHLORIDE SERPL-SCNC: 98 MMOL/L (ref 97–108)
CO2 SERPL-SCNC: 22 MMOL/L (ref 21–32)
CO2 SERPL-SCNC: 23 MMOL/L (ref 21–32)
CO2 SERPL-SCNC: 24 MMOL/L (ref 21–32)
CO2 SERPL-SCNC: 25 MMOL/L (ref 21–32)
CO2 SERPL-SCNC: 26 MMOL/L (ref 21–32)
CO2 SERPL-SCNC: 27 MMOL/L (ref 21–32)
CO2 SERPL-SCNC: 27 MMOL/L (ref 21–32)
CO2 SERPL-SCNC: 28 MMOL/L (ref 21–32)
CO2 SERPL-SCNC: 29 MMOL/L (ref 21–32)
CO2 SERPL-SCNC: 29 MMOL/L (ref 21–32)
COLOR UR: ABNORMAL
COLOR UR: NORMAL
COLOR UR: NORMAL
CREAT BLD-MCNC: 1 MG/DL (ref 0.6–1.3)
CREAT BLD-MCNC: 1 MG/DL (ref 0.6–1.3)
CREAT SERPL-MCNC: 0.83 MG/DL (ref 0.7–1.3)
CREAT SERPL-MCNC: 0.93 MG/DL (ref 0.7–1.3)
CREAT SERPL-MCNC: 0.95 MG/DL (ref 0.7–1.3)
CREAT SERPL-MCNC: 0.98 MG/DL (ref 0.7–1.3)
CREAT SERPL-MCNC: 0.99 MG/DL (ref 0.7–1.3)
CREAT SERPL-MCNC: 1 MG/DL (ref 0.7–1.3)
CREAT SERPL-MCNC: 1.06 MG/DL (ref 0.7–1.3)
CREAT SERPL-MCNC: 1.07 MG/DL (ref 0.7–1.3)
CREAT SERPL-MCNC: 1.08 MG/DL (ref 0.7–1.3)
CREAT SERPL-MCNC: 1.09 MG/DL (ref 0.7–1.3)
CREAT SERPL-MCNC: 1.11 MG/DL (ref 0.7–1.3)
CREAT SERPL-MCNC: 1.11 MG/DL (ref 0.7–1.3)
CREAT SERPL-MCNC: 1.12 MG/DL (ref 0.7–1.3)
CREAT SERPL-MCNC: 1.12 MG/DL (ref 0.7–1.3)
CREAT SERPL-MCNC: 1.31 MG/DL (ref 0.7–1.3)
CREAT SERPL-MCNC: 1.34 MG/DL (ref 0.7–1.3)
DIAGNOSIS, 93000: NORMAL
DIFFERENTIAL METHOD BLD: ABNORMAL
EOSINOPHIL # BLD: 0 K/UL
EOSINOPHIL # BLD: 0 K/UL (ref 0–0.4)
EOSINOPHIL # BLD: 0 K/UL (ref 0–0.4)
EOSINOPHIL # BLD: 0.1 K/UL (ref 0–0.4)
EOSINOPHIL # BLD: 0.2 K/UL (ref 0–0.4)
EOSINOPHIL # BLD: 0.3 K/UL
EOSINOPHIL # BLD: 0.3 K/UL (ref 0–0.4)
EOSINOPHIL # BLD: 0.4 K/UL (ref 0–0.4)
EOSINOPHIL NFR BLD: 0 %
EOSINOPHIL NFR BLD: 0 % (ref 0–7)
EOSINOPHIL NFR BLD: 0 % (ref 0–7)
EOSINOPHIL NFR BLD: 1 % (ref 0–7)
EOSINOPHIL NFR BLD: 2 %
EOSINOPHIL NFR BLD: 3 % (ref 0–7)
EOSINOPHIL NFR BLD: 6 % (ref 0–7)
EOSINOPHIL NFR BLD: 8 % (ref 0–7)
EPITH CASTS URNS QL MICRO: ABNORMAL /LPF
EPITH CASTS URNS QL MICRO: NORMAL /LPF
EPITH CASTS URNS QL MICRO: NORMAL /LPF
ERYTHROCYTE [DISTWIDTH] IN BLOOD BY AUTOMATED COUNT: 12.7 % (ref 11.5–14.5)
ERYTHROCYTE [DISTWIDTH] IN BLOOD BY AUTOMATED COUNT: 12.9 % (ref 11.5–14.5)
ERYTHROCYTE [DISTWIDTH] IN BLOOD BY AUTOMATED COUNT: 13.4 % (ref 11.5–14.5)
ERYTHROCYTE [DISTWIDTH] IN BLOOD BY AUTOMATED COUNT: 13.6 % (ref 11.5–14.5)
ERYTHROCYTE [DISTWIDTH] IN BLOOD BY AUTOMATED COUNT: 13.8 % (ref 11.5–14.5)
ERYTHROCYTE [DISTWIDTH] IN BLOOD BY AUTOMATED COUNT: 14.1 % (ref 11.5–14.5)
ERYTHROCYTE [DISTWIDTH] IN BLOOD BY AUTOMATED COUNT: 14.2 % (ref 11.8–15.8)
ERYTHROCYTE [DISTWIDTH] IN BLOOD BY AUTOMATED COUNT: 14.5 % (ref 11.5–14.5)
ERYTHROCYTE [DISTWIDTH] IN BLOOD BY AUTOMATED COUNT: 14.7 % (ref 11.5–14.5)
ERYTHROCYTE [DISTWIDTH] IN BLOOD BY AUTOMATED COUNT: 15 % (ref 11.5–14.5)
ERYTHROCYTE [DISTWIDTH] IN BLOOD BY AUTOMATED COUNT: 15.1 % (ref 11.5–14.5)
ERYTHROCYTE [DISTWIDTH] IN BLOOD BY AUTOMATED COUNT: 15.9 % (ref 11.5–14.5)
ERYTHROCYTE [DISTWIDTH] IN BLOOD BY AUTOMATED COUNT: 17.3 % (ref 11.5–14.5)
GLOBULIN SER CALC-MCNC: 3.3 G/DL (ref 2–4)
GLOBULIN SER CALC-MCNC: 3.4 G/DL (ref 2–4)
GLOBULIN SER CALC-MCNC: 3.4 G/DL (ref 2–4)
GLOBULIN SER CALC-MCNC: 3.6 G/DL (ref 2–4)
GLOBULIN SER CALC-MCNC: 3.7 G/DL (ref 2–4)
GLOBULIN SER CALC-MCNC: 3.8 G/DL (ref 2–4)
GLOBULIN SER CALC-MCNC: 3.9 G/DL (ref 2–4)
GLOBULIN SER CALC-MCNC: 3.9 G/DL (ref 2–4)
GLOBULIN SER CALC-MCNC: 4.2 G/DL (ref 2–4)
GLUCOSE SERPL-MCNC: 105 MG/DL (ref 65–100)
GLUCOSE SERPL-MCNC: 107 MG/DL (ref 65–100)
GLUCOSE SERPL-MCNC: 113 MG/DL (ref 65–100)
GLUCOSE SERPL-MCNC: 116 MG/DL (ref 65–100)
GLUCOSE SERPL-MCNC: 118 MG/DL (ref 65–100)
GLUCOSE SERPL-MCNC: 124 MG/DL (ref 65–100)
GLUCOSE SERPL-MCNC: 131 MG/DL (ref 65–100)
GLUCOSE SERPL-MCNC: 131 MG/DL (ref 65–100)
GLUCOSE SERPL-MCNC: 132 MG/DL (ref 65–100)
GLUCOSE SERPL-MCNC: 135 MG/DL (ref 65–100)
GLUCOSE SERPL-MCNC: 165 MG/DL (ref 65–100)
GLUCOSE SERPL-MCNC: 167 MG/DL (ref 65–100)
GLUCOSE SERPL-MCNC: 90 MG/DL (ref 65–100)
GLUCOSE SERPL-MCNC: 92 MG/DL (ref 65–100)
GLUCOSE SERPL-MCNC: 93 MG/DL (ref 65–100)
GLUCOSE SERPL-MCNC: 93 MG/DL (ref 65–100)
GLUCOSE UR STRIP.AUTO-MCNC: NEGATIVE MG/DL
HCT VFR BLD AUTO: 31.5 % (ref 36.6–50.3)
HCT VFR BLD AUTO: 33.6 % (ref 36.6–50.3)
HCT VFR BLD AUTO: 33.7 % (ref 36.6–50.3)
HCT VFR BLD AUTO: 33.9 % (ref 36.6–50.3)
HCT VFR BLD AUTO: 34.2 % (ref 36.6–50.3)
HCT VFR BLD AUTO: 34.2 % (ref 36.6–50.3)
HCT VFR BLD AUTO: 34.6 % (ref 36.6–50.3)
HCT VFR BLD AUTO: 35.7 % (ref 36.6–50.3)
HCT VFR BLD AUTO: 36.9 % (ref 36.6–50.3)
HCT VFR BLD AUTO: 37.4 % (ref 36.6–50.3)
HCT VFR BLD AUTO: 38.5 % (ref 36.6–50.3)
HCT VFR BLD AUTO: 40.8 % (ref 36.6–50.3)
HCT VFR BLD AUTO: 40.9 % (ref 36.6–50.3)
HCT VFR BLD AUTO: 42.1 % (ref 36.6–50.3)
HCT VFR BLD AUTO: 43 % (ref 36.6–50.3)
HGB BLD-MCNC: 10.9 G/DL (ref 12.1–17)
HGB BLD-MCNC: 11 G/DL (ref 12.1–17)
HGB BLD-MCNC: 11.5 G/DL (ref 12.1–17)
HGB BLD-MCNC: 11.6 G/DL (ref 12.1–17)
HGB BLD-MCNC: 11.8 G/DL (ref 12.1–17)
HGB BLD-MCNC: 11.9 G/DL (ref 12.1–17)
HGB BLD-MCNC: 12.2 G/DL (ref 12.1–17)
HGB BLD-MCNC: 12.3 G/DL (ref 12.1–17)
HGB BLD-MCNC: 13.1 G/DL (ref 12.1–17)
HGB BLD-MCNC: 13.9 G/DL (ref 12.1–17)
HGB BLD-MCNC: 14.1 G/DL (ref 12.1–17)
HGB BLD-MCNC: 14.2 G/DL (ref 12.1–17)
HGB BLD-MCNC: 14.2 G/DL (ref 12.1–17)
HGB UR QL STRIP: ABNORMAL
HGB UR QL STRIP: ABNORMAL
HGB UR QL STRIP: NEGATIVE
HYALINE CASTS URNS QL MICRO: ABNORMAL /LPF (ref 0–5)
HYALINE CASTS URNS QL MICRO: ABNORMAL /LPF (ref 0–5)
HYALINE CASTS URNS QL MICRO: NORMAL /LPF (ref 0–5)
HYALINE CASTS URNS QL MICRO: NORMAL /LPF (ref 0–5)
INR PPP: 1 (ref 0.9–1.1)
KETONES UR QL STRIP.AUTO: NEGATIVE MG/DL
LACTATE SERPL-SCNC: 0.6 MMOL/L (ref 0.4–2)
LEUKOCYTE ESTERASE UR QL STRIP.AUTO: ABNORMAL
LEUKOCYTE ESTERASE UR QL STRIP.AUTO: NEGATIVE
LYMPHOCYTES # BLD AUTO: 17 % (ref 12–49)
LYMPHOCYTES # BLD AUTO: 19 % (ref 12–49)
LYMPHOCYTES # BLD AUTO: 21 %
LYMPHOCYTES # BLD AUTO: 22 % (ref 12–49)
LYMPHOCYTES # BLD AUTO: 25 % (ref 12–49)
LYMPHOCYTES # BLD AUTO: 28 % (ref 12–49)
LYMPHOCYTES # BLD: 0.7 K/UL (ref 0.8–3.5)
LYMPHOCYTES # BLD: 1.2 K/UL (ref 0.8–3.5)
LYMPHOCYTES # BLD: 1.6 K/UL (ref 0.8–3.5)
LYMPHOCYTES # BLD: 1.7 K/UL
LYMPHOCYTES # BLD: 1.8 K/UL (ref 0.8–3.5)
LYMPHOCYTES # BLD: 1.8 K/UL (ref 0.8–3.5)
LYMPHOCYTES # BLD: 1.9 K/UL (ref 0.8–3.5)
LYMPHOCYTES # BLD: 1.9 K/UL (ref 0.8–3.5)
LYMPHOCYTES # BLD: 2.1 K/UL (ref 0.8–3.5)
LYMPHOCYTES # BLD: 2.1 K/UL (ref 0.8–3.5)
LYMPHOCYTES # BLD: 2.4 K/UL (ref 0.8–3.5)
LYMPHOCYTES # BLD: 3.8 K/UL
LYMPHOCYTES NFR BLD: 24 %
LYMPHOCYTES NFR BLD: 36 % (ref 12–49)
LYMPHOCYTES NFR BLD: 47 % (ref 12–49)
LYMPHOCYTES NFR BLD: 51 % (ref 12–49)
LYMPHOCYTES NFR BLD: 51 % (ref 12–49)
LYMPHOCYTES NFR BLD: 54 % (ref 12–49)
MCH RBC QN AUTO: 29.7 PG (ref 26–34)
MCH RBC QN AUTO: 30.2 PG (ref 26–34)
MCH RBC QN AUTO: 30.4 PG (ref 26–34)
MCH RBC QN AUTO: 30.4 PG (ref 26–34)
MCH RBC QN AUTO: 30.5 PG (ref 26–34)
MCH RBC QN AUTO: 30.5 PG (ref 26–34)
MCH RBC QN AUTO: 30.6 PG (ref 26–34)
MCH RBC QN AUTO: 30.7 PG (ref 26–34)
MCH RBC QN AUTO: 30.8 PG (ref 26–34)
MCH RBC QN AUTO: 30.8 PG (ref 26–34)
MCH RBC QN AUTO: 30.9 PG (ref 26–34)
MCH RBC QN AUTO: 30.9 PG (ref 26–34)
MCH RBC QN AUTO: 31.1 PG (ref 26–34)
MCH RBC QN AUTO: 31.4 PG (ref 26–34)
MCH RBC QN AUTO: 32.1 PG (ref 26–34)
MCHC RBC AUTO-ENTMCNC: 32.2 G/DL (ref 30–36.5)
MCHC RBC AUTO-ENTMCNC: 32.6 G/DL (ref 30–36.5)
MCHC RBC AUTO-ENTMCNC: 33 G/DL (ref 30–36.5)
MCHC RBC AUTO-ENTMCNC: 33.3 G/DL (ref 30–36.5)
MCHC RBC AUTO-ENTMCNC: 33.3 G/DL (ref 30–36.5)
MCHC RBC AUTO-ENTMCNC: 33.5 G/DL (ref 30–36.5)
MCHC RBC AUTO-ENTMCNC: 33.7 G/DL (ref 30–36.5)
MCHC RBC AUTO-ENTMCNC: 33.9 G/DL (ref 30–36.5)
MCHC RBC AUTO-ENTMCNC: 34 G/DL (ref 30–36.5)
MCHC RBC AUTO-ENTMCNC: 34.1 G/DL (ref 30–36.5)
MCHC RBC AUTO-ENTMCNC: 34.1 G/DL (ref 30–36.5)
MCHC RBC AUTO-ENTMCNC: 34.5 G/DL (ref 30–36.5)
MCHC RBC AUTO-ENTMCNC: 34.5 G/DL (ref 30–36.5)
MCHC RBC AUTO-ENTMCNC: 34.6 G/DL (ref 30–36.5)
MCHC RBC AUTO-ENTMCNC: 34.8 G/DL (ref 30–36.5)
MCV RBC AUTO: 88.1 FL (ref 80–99)
MCV RBC AUTO: 88.2 FL (ref 80–99)
MCV RBC AUTO: 89.4 FL (ref 80–99)
MCV RBC AUTO: 90.1 FL (ref 80–99)
MCV RBC AUTO: 90.4 FL (ref 80–99)
MCV RBC AUTO: 90.8 FL (ref 80–99)
MCV RBC AUTO: 91.1 FL (ref 80–99)
MCV RBC AUTO: 91.3 FL (ref 80–99)
MCV RBC AUTO: 91.5 FL (ref 80–99)
MCV RBC AUTO: 92.3 FL (ref 80–99)
MCV RBC AUTO: 92.4 FL (ref 80–99)
MCV RBC AUTO: 92.5 FL (ref 80–99)
MCV RBC AUTO: 92.6 FL (ref 80–99)
MCV RBC AUTO: 92.7 FL (ref 80–99)
MCV RBC AUTO: 93.2 FL (ref 80–99)
METAMYELOCYTES NFR BLD MANUAL: 4 %
MONOCYTES # BLD: 0.2 K/UL (ref 0–1)
MONOCYTES # BLD: 0.2 K/UL (ref 0–1)
MONOCYTES # BLD: 0.3 K/UL (ref 0–1)
MONOCYTES # BLD: 0.3 K/UL (ref 0–1)
MONOCYTES # BLD: 0.4 K/UL (ref 0–1)
MONOCYTES # BLD: 0.5 K/UL (ref 0–1)
MONOCYTES # BLD: 0.6 K/UL (ref 0–1)
MONOCYTES # BLD: 0.6 K/UL (ref 0–1)
MONOCYTES # BLD: 0.8 K/UL
MONOCYTES # BLD: 0.8 K/UL (ref 0–1)
MONOCYTES # BLD: 2 K/UL
MONOCYTES NFR BLD AUTO: 10 %
MONOCYTES NFR BLD AUTO: 12 % (ref 5–13)
MONOCYTES NFR BLD AUTO: 2 % (ref 5–13)
MONOCYTES NFR BLD AUTO: 4 % (ref 5–13)
MONOCYTES NFR BLD AUTO: 5 % (ref 5–13)
MONOCYTES NFR BLD AUTO: 6 % (ref 5–13)
MONOCYTES NFR BLD: 11 % (ref 5–13)
MONOCYTES NFR BLD: 12 % (ref 5–13)
MONOCYTES NFR BLD: 13 %
MONOCYTES NFR BLD: 13 % (ref 5–13)
MONOCYTES NFR BLD: 14 % (ref 5–13)
MYELOCYTES NFR BLD MANUAL: 1 %
MYELOCYTES NFR BLD MANUAL: 2 %
NEUTS BAND NFR BLD MANUAL: 1 %
NEUTS BAND NFR BLD MANUAL: 5 %
NEUTS SEG # BLD: 1 K/UL (ref 1.8–8)
NEUTS SEG # BLD: 1.2 K/UL (ref 1.8–8)
NEUTS SEG # BLD: 1.3 K/UL (ref 1.8–8)
NEUTS SEG # BLD: 1.6 K/UL (ref 1.8–8)
NEUTS SEG # BLD: 2.9 K/UL (ref 1.8–8)
NEUTS SEG # BLD: 3 K/UL (ref 1.8–8)
NEUTS SEG # BLD: 3.5 K/UL (ref 1.8–8)
NEUTS SEG # BLD: 3.7 K/UL (ref 1.8–8)
NEUTS SEG # BLD: 5.4 K/UL
NEUTS SEG # BLD: 6.6 K/UL (ref 1.8–8)
NEUTS SEG # BLD: 7.4 K/UL (ref 1.8–8)
NEUTS SEG # BLD: 8.6 K/UL
NEUTS SEG NFR BLD AUTO: 56 % (ref 32–75)
NEUTS SEG NFR BLD AUTO: 62 % (ref 32–75)
NEUTS SEG NFR BLD AUTO: 67 %
NEUTS SEG NFR BLD AUTO: 75 % (ref 32–75)
NEUTS SEG NFR BLD AUTO: 76 % (ref 32–75)
NEUTS SEG NFR BLD AUTO: 78 % (ref 32–75)
NEUTS SEG NFR BLD: 31 % (ref 32–75)
NEUTS SEG NFR BLD: 32 % (ref 32–75)
NEUTS SEG NFR BLD: 33 % (ref 32–75)
NEUTS SEG NFR BLD: 35 % (ref 32–75)
NEUTS SEG NFR BLD: 50 %
NEUTS SEG NFR BLD: 52 % (ref 32–75)
NITRITE UR QL STRIP.AUTO: NEGATIVE
NRBC # BLD: 0 K/UL (ref 0–0.01)
NRBC # BLD: 0.32 K/UL (ref 0–0.01)
NRBC BLD-RTO: 0 PER 100 WBC
NRBC BLD-RTO: 2 PER 100 WBC
P-R INTERVAL, ECG05: 122 MS
P-R INTERVAL, ECG05: 162 MS
P-R INTERVAL, ECG05: 176 MS
PH UR STRIP: 5.5 [PH] (ref 5–8)
PH UR STRIP: 6 [PH] (ref 5–8)
PH UR STRIP: 6.5 [PH] (ref 5–8)
PLATELET # BLD AUTO: 131 K/UL (ref 150–400)
PLATELET # BLD AUTO: 147 K/UL (ref 150–400)
PLATELET # BLD AUTO: 167 K/UL (ref 150–400)
PLATELET # BLD AUTO: 194 K/UL (ref 150–400)
PLATELET # BLD AUTO: 203 K/UL (ref 150–400)
PLATELET # BLD AUTO: 207 K/UL (ref 150–400)
PLATELET # BLD AUTO: 236 K/UL (ref 150–400)
PLATELET # BLD AUTO: 239 K/UL (ref 150–400)
PLATELET # BLD AUTO: 240 K/UL (ref 150–400)
PLATELET # BLD AUTO: 250 K/UL (ref 150–400)
PLATELET # BLD AUTO: 252 K/UL (ref 150–400)
PLATELET # BLD AUTO: 269 K/UL (ref 150–400)
PLATELET # BLD AUTO: 322 K/UL (ref 150–400)
PLATELET # BLD AUTO: 577 K/UL (ref 150–400)
PLATELET # BLD AUTO: 70 K/UL (ref 150–400)
POTASSIUM SERPL-SCNC: 3.7 MMOL/L (ref 3.5–5.1)
POTASSIUM SERPL-SCNC: 3.7 MMOL/L (ref 3.5–5.1)
POTASSIUM SERPL-SCNC: 3.8 MMOL/L (ref 3.5–5.1)
POTASSIUM SERPL-SCNC: 3.9 MMOL/L (ref 3.5–5.1)
POTASSIUM SERPL-SCNC: 3.9 MMOL/L (ref 3.5–5.1)
POTASSIUM SERPL-SCNC: 4 MMOL/L (ref 3.5–5.1)
POTASSIUM SERPL-SCNC: 4.1 MMOL/L (ref 3.5–5.1)
POTASSIUM SERPL-SCNC: 4.2 MMOL/L (ref 3.5–5.1)
POTASSIUM SERPL-SCNC: 4.4 MMOL/L (ref 3.5–5.1)
POTASSIUM SERPL-SCNC: 4.4 MMOL/L (ref 3.5–5.1)
POTASSIUM SERPL-SCNC: 4.6 MMOL/L (ref 3.5–5.1)
POTASSIUM SERPL-SCNC: 4.8 MMOL/L (ref 3.5–5.1)
PROT SERPL-MCNC: 6.4 G/DL (ref 6.4–8.2)
PROT SERPL-MCNC: 6.6 G/DL (ref 6.4–8.2)
PROT SERPL-MCNC: 7.2 G/DL (ref 6.4–8.2)
PROT SERPL-MCNC: 7.4 G/DL (ref 6.4–8.2)
PROT SERPL-MCNC: 7.6 G/DL (ref 6.4–8.2)
PROT SERPL-MCNC: 7.7 G/DL (ref 6.4–8.2)
PROT SERPL-MCNC: 7.8 G/DL (ref 6.4–8.2)
PROT SERPL-MCNC: 7.9 G/DL (ref 6.4–8.2)
PROT SERPL-MCNC: 7.9 G/DL (ref 6.4–8.2)
PROT SERPL-MCNC: 8 G/DL (ref 6.4–8.2)
PROT UR STRIP-MCNC: 30 MG/DL
PROT UR STRIP-MCNC: ABNORMAL MG/DL
PROT UR STRIP-MCNC: ABNORMAL MG/DL
PROT UR STRIP-MCNC: NEGATIVE MG/DL
PROT UR STRIP-MCNC: NEGATIVE MG/DL
PROTHROMBIN TIME: 10 SEC (ref 9–11.1)
PROTHROMBIN TIME: 10 SEC (ref 9–11.1)
PROTHROMBIN TIME: 10.2 SEC (ref 9–11.1)
Q-T INTERVAL, ECG07: 396 MS
Q-T INTERVAL, ECG07: 400 MS
Q-T INTERVAL, ECG07: 406 MS
QRS DURATION, ECG06: 126 MS
QRS DURATION, ECG06: 142 MS
QRS DURATION, ECG06: 142 MS
QTC CALCULATION (BEZET), ECG08: 439 MS
QTC CALCULATION (BEZET), ECG08: 474 MS
QTC CALCULATION (BEZET), ECG08: 484 MS
RBC # BLD AUTO: 3.57 M/UL (ref 4.1–5.7)
RBC # BLD AUTO: 3.69 M/UL (ref 4.1–5.7)
RBC # BLD AUTO: 3.7 M/UL (ref 4.1–5.7)
RBC # BLD AUTO: 3.74 M/UL (ref 4.1–5.7)
RBC # BLD AUTO: 3.76 M/UL (ref 4.1–5.7)
RBC # BLD AUTO: 3.81 M/UL (ref 4.1–5.7)
RBC # BLD AUTO: 3.85 M/UL (ref 4.1–5.7)
RBC # BLD AUTO: 3.92 M/UL (ref 4.1–5.7)
RBC # BLD AUTO: 4 M/UL (ref 4.1–5.7)
RBC # BLD AUTO: 4.04 M/UL (ref 4.1–5.7)
RBC # BLD AUTO: 4.26 M/UL (ref 4.1–5.7)
RBC # BLD AUTO: 4.39 M/UL (ref 4.1–5.7)
RBC # BLD AUTO: 4.47 M/UL (ref 4.1–5.7)
RBC # BLD AUTO: 4.6 M/UL (ref 4.1–5.7)
RBC # BLD AUTO: 4.65 M/UL (ref 4.1–5.7)
RBC #/AREA URNS HPF: ABNORMAL /HPF (ref 0–5)
RBC #/AREA URNS HPF: NORMAL /HPF (ref 0–5)
RBC #/AREA URNS HPF: NORMAL /HPF (ref 0–5)
RBC MORPH BLD: ABNORMAL
SERVICE CMNT-IMP: NORMAL
SERVICE CMNT-IMP: NORMAL
SODIUM SERPL-SCNC: 133 MMOL/L (ref 136–145)
SODIUM SERPL-SCNC: 134 MMOL/L (ref 136–145)
SODIUM SERPL-SCNC: 134 MMOL/L (ref 136–145)
SODIUM SERPL-SCNC: 135 MMOL/L (ref 136–145)
SODIUM SERPL-SCNC: 135 MMOL/L (ref 136–145)
SODIUM SERPL-SCNC: 136 MMOL/L (ref 136–145)
SODIUM SERPL-SCNC: 136 MMOL/L (ref 136–145)
SODIUM SERPL-SCNC: 137 MMOL/L (ref 136–145)
SODIUM SERPL-SCNC: 138 MMOL/L (ref 136–145)
SODIUM SERPL-SCNC: 138 MMOL/L (ref 136–145)
SODIUM SERPL-SCNC: 139 MMOL/L (ref 136–145)
SODIUM SERPL-SCNC: 141 MMOL/L (ref 136–145)
SP GR UR REFRACTOMETRY: 1.01 (ref 1–1.03)
SP GR UR REFRACTOMETRY: 1.01 (ref 1–1.03)
SP GR UR REFRACTOMETRY: 1.02 (ref 1–1.03)
SP GR UR REFRACTOMETRY: 1.02 (ref 1–1.03)
SP GR UR REFRACTOMETRY: >1.03 (ref 1–1.03)
SPECIMEN EXP DATE BLD: NORMAL
SPECIMEN EXP DATE BLD: NORMAL
THERAPEUTIC RANGE,PTTT: NORMAL SECS (ref 58–77)
THERAPEUTIC RANGE,PTTT: NORMAL SECS (ref 58–77)
UA: UC IF INDICATED,UAUC: ABNORMAL
UA: UC IF INDICATED,UAUC: NORMAL
UROBILINOGEN UR QL STRIP.AUTO: 0.2 EU/DL (ref 0.2–1)
VENTRICULAR RATE, ECG03: 74 BPM
VENTRICULAR RATE, ECG03: 82 BPM
VENTRICULAR RATE, ECG03: 88 BPM
WBC # BLD AUTO: 15.7 K/UL (ref 4.1–11.1)
WBC # BLD AUTO: 3.3 K/UL (ref 4.1–11.1)
WBC # BLD AUTO: 3.6 K/UL (ref 4.1–11.1)
WBC # BLD AUTO: 3.8 K/UL (ref 4.1–11.1)
WBC # BLD AUTO: 4.2 K/UL (ref 4.1–11.1)
WBC # BLD AUTO: 4.6 K/UL (ref 4.1–11.1)
WBC # BLD AUTO: 4.9 K/UL (ref 4.1–11.1)
WBC # BLD AUTO: 6.4 K/UL (ref 4.1–11.1)
WBC # BLD AUTO: 6.7 K/UL (ref 4.1–11.1)
WBC # BLD AUTO: 7 K/UL (ref 4.1–11.1)
WBC # BLD AUTO: 7.2 K/UL (ref 4.1–11.1)
WBC # BLD AUTO: 7.9 K/UL (ref 4.1–11.1)
WBC # BLD AUTO: 8.7 K/UL (ref 4.1–11.1)
WBC # BLD AUTO: 9.3 K/UL (ref 4.1–11.1)
WBC # BLD AUTO: 9.5 K/UL (ref 4.1–11.1)
WBC MORPH BLD: ABNORMAL
WBC NRBC COR # BLD: ABNORMAL 10*3/UL
WBC URNS QL MICRO: ABNORMAL /HPF (ref 0–4)
WBC URNS QL MICRO: NORMAL /HPF (ref 0–4)
WBC URNS QL MICRO: NORMAL /HPF (ref 0–4)

## 2017-01-01 PROCEDURE — 77030035277 HC OBTRTR BLDELSS DISP INTU -B: Performed by: UROLOGY

## 2017-01-01 PROCEDURE — 74011250636 HC RX REV CODE- 250/636: Performed by: ANESTHESIOLOGY

## 2017-01-01 PROCEDURE — 74011000250 HC RX REV CODE- 250: Performed by: UROLOGY

## 2017-01-01 PROCEDURE — 77030008768 HC TU NG VYGC -A

## 2017-01-01 PROCEDURE — 96413 CHEMO IV INFUSION 1 HR: CPT

## 2017-01-01 PROCEDURE — 77030005520 HC CATH URETH FOL38 BARD -A: Performed by: UROLOGY

## 2017-01-01 PROCEDURE — 86900 BLOOD TYPING SEROLOGIC ABO: CPT | Performed by: UROLOGY

## 2017-01-01 PROCEDURE — 65270000029 HC RM PRIVATE

## 2017-01-01 PROCEDURE — 86923 COMPATIBILITY TEST ELECTRIC: CPT | Performed by: SURGERY

## 2017-01-01 PROCEDURE — 74011250636 HC RX REV CODE- 250/636: Performed by: SURGERY

## 2017-01-01 PROCEDURE — 76010000133 HC OR TIME 3 TO 3.5 HR: Performed by: SURGERY

## 2017-01-01 PROCEDURE — 77030011640 HC PAD GRND REM COVD -A: Performed by: UROLOGY

## 2017-01-01 PROCEDURE — 74177 CT ABD & PELVIS W/CONTRAST: CPT

## 2017-01-01 PROCEDURE — 77030011267 HC ELECTRD BLD COVD -A: Performed by: SURGERY

## 2017-01-01 PROCEDURE — 71020 XR CHEST PA LAT: CPT

## 2017-01-01 PROCEDURE — 81001 URINALYSIS AUTO W/SCOPE: CPT | Performed by: EMERGENCY MEDICINE

## 2017-01-01 PROCEDURE — 74011250636 HC RX REV CODE- 250/636

## 2017-01-01 PROCEDURE — 86900 BLOOD TYPING SEROLOGIC ABO: CPT | Performed by: EMERGENCY MEDICINE

## 2017-01-01 PROCEDURE — 77030020782 HC GWN BAIR PAWS FLX 3M -B

## 2017-01-01 PROCEDURE — 99282 EMERGENCY DEPT VISIT SF MDM: CPT

## 2017-01-01 PROCEDURE — 74011250636 HC RX REV CODE- 250/636: Performed by: INTERNAL MEDICINE

## 2017-01-01 PROCEDURE — 88305 TISSUE EXAM BY PATHOLOGIST: CPT | Performed by: UROLOGY

## 2017-01-01 PROCEDURE — 51798 US URINE CAPACITY MEASURE: CPT

## 2017-01-01 PROCEDURE — 0FB10ZX EXCISION OF RIGHT LOBE LIVER, OPEN APPROACH, DIAGNOSTIC: ICD-10-PCS | Performed by: SURGERY

## 2017-01-01 PROCEDURE — 77030018836 HC SOL IRR NACL ICUM -A: Performed by: SURGERY

## 2017-01-01 PROCEDURE — 74011250636 HC RX REV CODE- 250/636: Performed by: OTOLARYNGOLOGY

## 2017-01-01 PROCEDURE — 36415 COLL VENOUS BLD VENIPUNCTURE: CPT | Performed by: INTERNAL MEDICINE

## 2017-01-01 PROCEDURE — 77030021454 HC SUT VLOC ABS COVD -B: Performed by: UROLOGY

## 2017-01-01 PROCEDURE — 77030010545

## 2017-01-01 PROCEDURE — 74011000250 HC RX REV CODE- 250: Performed by: RADIOLOGY

## 2017-01-01 PROCEDURE — 36415 COLL VENOUS BLD VENIPUNCTURE: CPT | Performed by: SURGERY

## 2017-01-01 PROCEDURE — 77030019702 HC WRP THER MENM -C: Performed by: SURGERY

## 2017-01-01 PROCEDURE — 74011250637 HC RX REV CODE- 250/637: Performed by: SURGERY

## 2017-01-01 PROCEDURE — 80076 HEPATIC FUNCTION PANEL: CPT | Performed by: INTERNAL MEDICINE

## 2017-01-01 PROCEDURE — 77030012965 HC NDL HUBR BBMI -A

## 2017-01-01 PROCEDURE — 85027 COMPLETE CBC AUTOMATED: CPT | Performed by: SURGERY

## 2017-01-01 PROCEDURE — 77030011640 HC PAD GRND REM COVD -A: Performed by: SURGERY

## 2017-01-01 PROCEDURE — 0VT04ZZ RESECTION OF PROSTATE, PERCUTANEOUS ENDOSCOPIC APPROACH: ICD-10-PCS | Performed by: UROLOGY

## 2017-01-01 PROCEDURE — 77030035488 HC SEAL UNIV DISP INTU -C: Performed by: UROLOGY

## 2017-01-01 PROCEDURE — 81001 URINALYSIS AUTO W/SCOPE: CPT | Performed by: SURGERY

## 2017-01-01 PROCEDURE — 77030035051: Performed by: SURGERY

## 2017-01-01 PROCEDURE — 77030026438 HC STYL ET INTUB CARD -A: Performed by: ANESTHESIOLOGY

## 2017-01-01 PROCEDURE — 77030035048 HC TRCR ENDOSC OPTCL COVD -B: Performed by: UROLOGY

## 2017-01-01 PROCEDURE — 96411 CHEMO IV PUSH ADDL DRUG: CPT

## 2017-01-01 PROCEDURE — 85025 COMPLETE CBC W/AUTO DIFF WBC: CPT | Performed by: INTERNAL MEDICINE

## 2017-01-01 PROCEDURE — 80053 COMPREHEN METABOLIC PANEL: CPT | Performed by: UROLOGY

## 2017-01-01 PROCEDURE — 74011000250 HC RX REV CODE- 250

## 2017-01-01 PROCEDURE — 96368 THER/DIAG CONCURRENT INF: CPT

## 2017-01-01 PROCEDURE — 36415 COLL VENOUS BLD VENIPUNCTURE: CPT | Performed by: EMERGENCY MEDICINE

## 2017-01-01 PROCEDURE — 88309 TISSUE EXAM BY PATHOLOGIST: CPT | Performed by: SURGERY

## 2017-01-01 PROCEDURE — 77030008771 HC TU NG SALEM SUMP -A: Performed by: NURSE ANESTHETIST, CERTIFIED REGISTERED

## 2017-01-01 PROCEDURE — 96366 THER/PROPH/DIAG IV INF ADDON: CPT

## 2017-01-01 PROCEDURE — 74011000250 HC RX REV CODE- 250: Performed by: INTERNAL MEDICINE

## 2017-01-01 PROCEDURE — 88342 IMHCHEM/IMCYTCHM 1ST ANTB: CPT | Performed by: SURGERY

## 2017-01-01 PROCEDURE — 36415 COLL VENOUS BLD VENIPUNCTURE: CPT

## 2017-01-01 PROCEDURE — 77030035048 HC TRCR ENDOSC OPTCL COVD -B: Performed by: SURGERY

## 2017-01-01 PROCEDURE — 76210000016 HC OR PH I REC 1 TO 1.5 HR: Performed by: UROLOGY

## 2017-01-01 PROCEDURE — C9113 INJ PANTOPRAZOLE SODIUM, VIA: HCPCS | Performed by: SURGERY

## 2017-01-01 PROCEDURE — 77030009965 HC RELD STPLR ENDOS COVD -D: Performed by: SURGERY

## 2017-01-01 PROCEDURE — 85730 THROMBOPLASTIN TIME PARTIAL: CPT | Performed by: SURGERY

## 2017-01-01 PROCEDURE — 74011000258 HC RX REV CODE- 258: Performed by: INTERNAL MEDICINE

## 2017-01-01 PROCEDURE — 76060000037 HC ANESTHESIA 3 TO 3.5 HR: Performed by: SURGERY

## 2017-01-01 PROCEDURE — 82378 CARCINOEMBRYONIC ANTIGEN: CPT | Performed by: INTERNAL MEDICINE

## 2017-01-01 PROCEDURE — 77030034849: Performed by: SURGERY

## 2017-01-01 PROCEDURE — 81275 KRAS GENE VARIANTS EXON 2: CPT | Performed by: SURGERY

## 2017-01-01 PROCEDURE — 80048 BASIC METABOLIC PNL TOTAL CA: CPT | Performed by: INTERNAL MEDICINE

## 2017-01-01 PROCEDURE — 96415 CHEMO IV INFUSION ADDL HR: CPT

## 2017-01-01 PROCEDURE — 81001 URINALYSIS AUTO W/SCOPE: CPT | Performed by: UROLOGY

## 2017-01-01 PROCEDURE — 94762 N-INVAS EAR/PLS OXIMTRY CONT: CPT

## 2017-01-01 PROCEDURE — 77030014647 HC SEAL FBRN TISSL BAXT -D: Performed by: UROLOGY

## 2017-01-01 PROCEDURE — 77030008684 HC TU ET CUF COVD -B: Performed by: ANESTHESIOLOGY

## 2017-01-01 PROCEDURE — 77030013887: Performed by: SURGERY

## 2017-01-01 PROCEDURE — 81403 MOPATH PROCEDURE LEVEL 4: CPT | Performed by: SURGERY

## 2017-01-01 PROCEDURE — 71260 CT THORAX DX C+: CPT

## 2017-01-01 PROCEDURE — 77030035220 HC TRCR ENDOSC BLNTPRT ANCHR COVD -B: Performed by: SURGERY

## 2017-01-01 PROCEDURE — 77030035029 HC NDL INSUF VERES DISP COVD -B: Performed by: UROLOGY

## 2017-01-01 PROCEDURE — 77030016151 HC PROTCTR LNS DFOG COVD -B: Performed by: UROLOGY

## 2017-01-01 PROCEDURE — 85025 COMPLETE CBC W/AUTO DIFF WBC: CPT | Performed by: EMERGENCY MEDICINE

## 2017-01-01 PROCEDURE — 77030015933 HC FIBRIJET DUPLO BAXT -B: Performed by: SURGERY

## 2017-01-01 PROCEDURE — 76010000131 HC OR TIME 2 TO 2.5 HR: Performed by: SURGERY

## 2017-01-01 PROCEDURE — 77030018846 HC SOL IRR STRL H20 ICUM -A: Performed by: UROLOGY

## 2017-01-01 PROCEDURE — 74011000250 HC RX REV CODE- 250: Performed by: SURGERY

## 2017-01-01 PROCEDURE — 96416 CHEMO PROLONG INFUSE W/PUMP: CPT

## 2017-01-01 PROCEDURE — 77030002996 HC SUT SLK J&J -A: Performed by: SURGERY

## 2017-01-01 PROCEDURE — 07BC4ZX EXCISION OF PELVIS LYMPHATIC, PERCUTANEOUS ENDOSCOPIC APPROACH, DIAGNOSTIC: ICD-10-PCS | Performed by: UROLOGY

## 2017-01-01 PROCEDURE — 77030032490 HC SLV COMPR SCD KNE COVD -B: Performed by: UROLOGY

## 2017-01-01 PROCEDURE — 82378 CARCINOEMBRYONIC ANTIGEN: CPT | Performed by: SURGERY

## 2017-01-01 PROCEDURE — 72191 CT ANGIOGRAPH PELV W/O&W/DYE: CPT

## 2017-01-01 PROCEDURE — 80053 COMPREHEN METABOLIC PANEL: CPT | Performed by: SURGERY

## 2017-01-01 PROCEDURE — 93005 ELECTROCARDIOGRAM TRACING: CPT

## 2017-01-01 PROCEDURE — 88307 TISSUE EXAM BY PATHOLOGIST: CPT | Performed by: SURGERY

## 2017-01-01 PROCEDURE — 85610 PROTHROMBIN TIME: CPT | Performed by: EMERGENCY MEDICINE

## 2017-01-01 PROCEDURE — 77030022474 HC RELD STPLR ENDO GIA COVD -C: Performed by: UROLOGY

## 2017-01-01 PROCEDURE — 74011250636 HC RX REV CODE- 250/636: Performed by: UROLOGY

## 2017-01-01 PROCEDURE — 77030032490 HC SLV COMPR SCD KNE COVD -B: Performed by: SURGERY

## 2017-01-01 PROCEDURE — 77030020263 HC SOL INJ SOD CL0.9% LFCR 1000ML: Performed by: SURGERY

## 2017-01-01 PROCEDURE — 82565 ASSAY OF CREATININE: CPT

## 2017-01-01 PROCEDURE — 87086 URINE CULTURE/COLONY COUNT: CPT | Performed by: EMERGENCY MEDICINE

## 2017-01-01 PROCEDURE — 80048 BASIC METABOLIC PNL TOTAL CA: CPT | Performed by: SURGERY

## 2017-01-01 PROCEDURE — 77010033678 HC OXYGEN DAILY

## 2017-01-01 PROCEDURE — 0DTF0ZZ RESECTION OF RIGHT LARGE INTESTINE, OPEN APPROACH: ICD-10-PCS | Performed by: SURGERY

## 2017-01-01 PROCEDURE — 77030019908 HC STETH ESOPH SIMS -A: Performed by: NURSE ANESTHETIST, CERTIFIED REGISTERED

## 2017-01-01 PROCEDURE — 77030010507 HC ADH SKN DERMBND J&J -B: Performed by: UROLOGY

## 2017-01-01 PROCEDURE — 0FB14ZZ EXCISION OF RIGHT LOBE LIVER, PERCUTANEOUS ENDOSCOPIC APPROACH: ICD-10-PCS | Performed by: SURGERY

## 2017-01-01 PROCEDURE — 77030019563 HC DEV ATTCH FEED HOLL -A

## 2017-01-01 PROCEDURE — 96375 TX/PRO/DX INJ NEW DRUG ADDON: CPT

## 2017-01-01 PROCEDURE — 74011636320 HC RX REV CODE- 636/320: Performed by: EMERGENCY MEDICINE

## 2017-01-01 PROCEDURE — 88307 TISSUE EXAM BY PATHOLOGIST: CPT | Performed by: UROLOGY

## 2017-01-01 PROCEDURE — 77030008684 HC TU ET CUF COVD -B: Performed by: NURSE ANESTHETIST, CERTIFIED REGISTERED

## 2017-01-01 PROCEDURE — 74011000255 HC RX REV CODE- 255: Performed by: INTERNAL MEDICINE

## 2017-01-01 PROCEDURE — 77030013567 HC DRN WND RESERV BARD -A: Performed by: UROLOGY

## 2017-01-01 PROCEDURE — 77030027138 HC INCENT SPIROMETER -A

## 2017-01-01 PROCEDURE — 80053 COMPREHEN METABOLIC PANEL: CPT | Performed by: EMERGENCY MEDICINE

## 2017-01-01 PROCEDURE — 36561 INSERT TUNNELED CV CATH: CPT

## 2017-01-01 PROCEDURE — 74011000258 HC RX REV CODE- 258: Performed by: SURGERY

## 2017-01-01 PROCEDURE — 77030002966 HC SUT PDS J&J -A: Performed by: SURGERY

## 2017-01-01 PROCEDURE — 77030010517 HC APPL SEAL FLOSEL BAXT -B: Performed by: UROLOGY

## 2017-01-01 PROCEDURE — 77030010296 HC STPLR INT COVD -B: Performed by: SURGERY

## 2017-01-01 PROCEDURE — 76210000016 HC OR PH I REC 1 TO 1.5 HR: Performed by: SURGERY

## 2017-01-01 PROCEDURE — 86900 BLOOD TYPING SEROLOGIC ABO: CPT | Performed by: SURGERY

## 2017-01-01 PROCEDURE — 85025 COMPLETE CBC W/AUTO DIFF WBC: CPT | Performed by: SURGERY

## 2017-01-01 PROCEDURE — 76060000036 HC ANESTHESIA 2.5 TO 3 HR: Performed by: UROLOGY

## 2017-01-01 PROCEDURE — C1892 INTRO/SHEATH,FIXED,PEEL-AWAY: HCPCS

## 2017-01-01 PROCEDURE — 74011250636 HC RX REV CODE- 250/636: Performed by: EMERGENCY MEDICINE

## 2017-01-01 PROCEDURE — 76010000877 HC OR TIME 2.5 TO 3HR INTENSV - TIER 2: Performed by: UROLOGY

## 2017-01-01 PROCEDURE — 74011250636 HC RX REV CODE- 250/636: Performed by: FAMILY MEDICINE

## 2017-01-01 PROCEDURE — P9045 ALBUMIN (HUMAN), 5%, 250 ML: HCPCS

## 2017-01-01 PROCEDURE — 77030020061 HC IV BLD WRMR ADMIN SET 3M -B: Performed by: ANESTHESIOLOGY

## 2017-01-01 PROCEDURE — 99284 EMERGENCY DEPT VISIT MOD MDM: CPT

## 2017-01-01 PROCEDURE — 77030003481 HC NDL BIOP GUN BARD -B: Performed by: SURGERY

## 2017-01-01 PROCEDURE — 77030002916 HC SUT ETHLN J&J -A: Performed by: UROLOGY

## 2017-01-01 PROCEDURE — 74011636320 HC RX REV CODE- 636/320: Performed by: SURGERY

## 2017-01-01 PROCEDURE — 77030019908 HC STETH ESOPH SIMS -A: Performed by: ANESTHESIOLOGY

## 2017-01-01 PROCEDURE — 77030012407 HC DRN WND BARD -B: Performed by: UROLOGY

## 2017-01-01 PROCEDURE — 77030013140 HC MSK NEB VYRM -A

## 2017-01-01 PROCEDURE — 85610 PROTHROMBIN TIME: CPT | Performed by: UROLOGY

## 2017-01-01 PROCEDURE — 36415 COLL VENOUS BLD VENIPUNCTURE: CPT | Performed by: UROLOGY

## 2017-01-01 PROCEDURE — 74011250637 HC RX REV CODE- 250/637

## 2017-01-01 PROCEDURE — 74011250637 HC RX REV CODE- 250/637: Performed by: UROLOGY

## 2017-01-01 PROCEDURE — 51702 INSERT TEMP BLADDER CATH: CPT

## 2017-01-01 PROCEDURE — 80053 COMPREHEN METABOLIC PANEL: CPT | Performed by: INTERNAL MEDICINE

## 2017-01-01 PROCEDURE — 77030009852 HC PCH RTVR ENDOSC COVD -B: Performed by: SURGERY

## 2017-01-01 PROCEDURE — 81276 KRAS GENE ADDL VARIANTS: CPT | Performed by: SURGERY

## 2017-01-01 PROCEDURE — 77030005514 HC CATH URETH FOL14 BARD -A

## 2017-01-01 PROCEDURE — 77030002966 HC SUT PDS J&J -A: Performed by: UROLOGY

## 2017-01-01 PROCEDURE — 77030013533 HC SEAL FBRN TISSL RDYTUSE 10ML BAXT -E: Performed by: SURGERY

## 2017-01-01 PROCEDURE — 77030026438 HC STYL ET INTUB CARD -A: Performed by: NURSE ANESTHETIST, CERTIFIED REGISTERED

## 2017-01-01 PROCEDURE — 8E0W4CZ ROBOTIC ASSISTED PROCEDURE OF TRUNK REGION, PERCUTANEOUS ENDOSCOPIC APPROACH: ICD-10-PCS | Performed by: UROLOGY

## 2017-01-01 PROCEDURE — 96372 THER/PROPH/DIAG INJ SC/IM: CPT

## 2017-01-01 PROCEDURE — 77030018832 HC SOL IRR H20 ICUM -A: Performed by: UROLOGY

## 2017-01-01 PROCEDURE — 77030008756 HC TU IRR SUC STRY -B: Performed by: UROLOGY

## 2017-01-01 PROCEDURE — 77030013079 HC BLNKT BAIR HGGR 3M -A: Performed by: ANESTHESIOLOGY

## 2017-01-01 PROCEDURE — 74011250636 HC RX REV CODE- 250/636: Performed by: RADIOLOGY

## 2017-01-01 PROCEDURE — 83605 ASSAY OF LACTIC ACID: CPT | Performed by: EMERGENCY MEDICINE

## 2017-01-01 PROCEDURE — 74000 XR ABD (KUB): CPT

## 2017-01-01 PROCEDURE — 77030020061 HC IV BLD WRMR ADMIN SET 3M -B: Performed by: NURSE ANESTHETIST, CERTIFIED REGISTERED

## 2017-01-01 PROCEDURE — 96374 THER/PROPH/DIAG INJ IV PUSH: CPT

## 2017-01-01 PROCEDURE — 74011636320 HC RX REV CODE- 636/320: Performed by: INTERNAL MEDICINE

## 2017-01-01 PROCEDURE — C1788 PORT, INDWELLING, IMP: HCPCS

## 2017-01-01 PROCEDURE — 77030013079 HC BLNKT BAIR HGGR 3M -A: Performed by: NURSE ANESTHETIST, CERTIFIED REGISTERED

## 2017-01-01 PROCEDURE — 74000 XR ABD PORT  1 V: CPT

## 2017-01-01 PROCEDURE — 77030008771 HC TU NG SALEM SUMP -A: Performed by: ANESTHESIOLOGY

## 2017-01-01 PROCEDURE — 77030031139 HC SUT VCRL2 J&J -A

## 2017-01-01 PROCEDURE — 76060000035 HC ANESTHESIA 2 TO 2.5 HR: Performed by: SURGERY

## 2017-01-01 PROCEDURE — 77030031139 HC SUT VCRL2 J&J -A: Performed by: SURGERY

## 2017-01-01 PROCEDURE — 77030002933 HC SUT MCRYL J&J -A: Performed by: UROLOGY

## 2017-01-01 PROCEDURE — 77030009932 HC PRB FT CTRL J&J -B: Performed by: SURGERY

## 2017-01-01 PROCEDURE — 77030008467 HC STPLR SKN COVD -B: Performed by: SURGERY

## 2017-01-01 PROCEDURE — 85027 COMPLETE CBC AUTOMATED: CPT | Performed by: UROLOGY

## 2017-01-01 PROCEDURE — 77030034698 HC LIGASURE MRYLND OPN SEAL DIV COVD -F: Performed by: SURGERY

## 2017-01-01 PROCEDURE — 74011250637 HC RX REV CODE- 250/637: Performed by: EMERGENCY MEDICINE

## 2017-01-01 PROCEDURE — 96361 HYDRATE IV INFUSION ADD-ON: CPT

## 2017-01-01 PROCEDURE — 77030035045 HC TRCR ENDOSC VRSPRT BLDLSS COVD -B: Performed by: UROLOGY

## 2017-01-01 PROCEDURE — 74011250637 HC RX REV CODE- 250/637: Performed by: FAMILY MEDICINE

## 2017-01-01 PROCEDURE — 88309 TISSUE EXAM BY PATHOLOGIST: CPT | Performed by: UROLOGY

## 2017-01-01 PROCEDURE — 74011000250 HC RX REV CODE- 250: Performed by: EMERGENCY MEDICINE

## 2017-01-01 PROCEDURE — 77030010507 HC ADH SKN DERMBND J&J -B

## 2017-01-01 PROCEDURE — 80048 BASIC METABOLIC PNL TOTAL CA: CPT | Performed by: UROLOGY

## 2017-01-01 PROCEDURE — 96521 REFILL/MAINT PORTABLE PUMP: CPT

## 2017-01-01 PROCEDURE — 77030009852 HC PCH RTVR ENDOSC COVD -B: Performed by: UROLOGY

## 2017-01-01 PROCEDURE — 77030035279 HC SEAL VSL ENDOWR XI INTU -I2: Performed by: UROLOGY

## 2017-01-01 PROCEDURE — 85610 PROTHROMBIN TIME: CPT | Performed by: SURGERY

## 2017-01-01 PROCEDURE — 74011636320 HC RX REV CODE- 636/320: Performed by: OTOLARYNGOLOGY

## 2017-01-01 PROCEDURE — 85730 THROMBOPLASTIN TIME PARTIAL: CPT | Performed by: UROLOGY

## 2017-01-01 PROCEDURE — 87086 URINE CULTURE/COLONY COUNT: CPT | Performed by: UROLOGY

## 2017-01-01 PROCEDURE — 77030010507 HC ADH SKN DERMBND J&J -B: Performed by: SURGERY

## 2017-01-01 RX ORDER — VECURONIUM BROMIDE FOR INJECTION 1 MG/ML
INJECTION, POWDER, LYOPHILIZED, FOR SOLUTION INTRAVENOUS AS NEEDED
Status: DISCONTINUED | OUTPATIENT
Start: 2017-01-01 | End: 2017-01-01 | Stop reason: HOSPADM

## 2017-01-01 RX ORDER — SODIUM CHLORIDE 9 MG/ML
50 INJECTION, SOLUTION INTRAVENOUS
Status: COMPLETED | OUTPATIENT
Start: 2017-01-01 | End: 2017-01-01

## 2017-01-01 RX ORDER — SODIUM CHLORIDE, SODIUM LACTATE, POTASSIUM CHLORIDE, CALCIUM CHLORIDE 600; 310; 30; 20 MG/100ML; MG/100ML; MG/100ML; MG/100ML
INJECTION, SOLUTION INTRAVENOUS
Status: DISCONTINUED | OUTPATIENT
Start: 2017-01-01 | End: 2017-01-01 | Stop reason: HOSPADM

## 2017-01-01 RX ORDER — MIDAZOLAM HYDROCHLORIDE 1 MG/ML
1 INJECTION, SOLUTION INTRAMUSCULAR; INTRAVENOUS AS NEEDED
Status: DISCONTINUED | OUTPATIENT
Start: 2017-01-01 | End: 2017-01-01 | Stop reason: HOSPADM

## 2017-01-01 RX ORDER — HYDROCODONE BITARTRATE AND ACETAMINOPHEN 5; 325 MG/1; MG/1
1-2 TABLET ORAL
Qty: 30 TAB | Refills: 0 | Status: SHIPPED | OUTPATIENT
Start: 2017-01-01 | End: 2017-01-01 | Stop reason: SDUPTHER

## 2017-01-01 RX ORDER — GLYCOPYRROLATE 0.2 MG/ML
INJECTION INTRAMUSCULAR; INTRAVENOUS AS NEEDED
Status: DISCONTINUED | OUTPATIENT
Start: 2017-01-01 | End: 2017-01-01 | Stop reason: HOSPADM

## 2017-01-01 RX ORDER — FLUOROURACIL 50 MG/ML
830 INJECTION, SOLUTION INTRAVENOUS ONCE
Status: DISCONTINUED | OUTPATIENT
Start: 2017-01-01 | End: 2017-01-01

## 2017-01-01 RX ORDER — HEPARIN SODIUM 5000 [USP'U]/ML
5000 INJECTION, SOLUTION INTRAVENOUS; SUBCUTANEOUS ONCE
Status: CANCELLED | OUTPATIENT
Start: 2017-01-01 | End: 2017-01-01

## 2017-01-01 RX ORDER — ACETAMINOPHEN 500 MG
1000 TABLET ORAL EVERY 6 HOURS
Status: DISCONTINUED | OUTPATIENT
Start: 2017-01-01 | End: 2017-01-01

## 2017-01-01 RX ORDER — HEPARIN 100 UNIT/ML
500 SYRINGE INTRAVENOUS AS NEEDED
Status: ACTIVE | OUTPATIENT
Start: 2017-01-01 | End: 2017-01-01

## 2017-01-01 RX ORDER — PROPOFOL 10 MG/ML
INJECTION, EMULSION INTRAVENOUS AS NEEDED
Status: DISCONTINUED | OUTPATIENT
Start: 2017-01-01 | End: 2017-01-01 | Stop reason: HOSPADM

## 2017-01-01 RX ORDER — SODIUM CHLORIDE 9 MG/ML
10 INJECTION INTRAMUSCULAR; INTRAVENOUS; SUBCUTANEOUS AS NEEDED
Status: ACTIVE | OUTPATIENT
Start: 2017-01-01 | End: 2017-01-01

## 2017-01-01 RX ORDER — HYDROMORPHONE HYDROCHLORIDE 2 MG/ML
.5-1 INJECTION, SOLUTION INTRAMUSCULAR; INTRAVENOUS; SUBCUTANEOUS
Status: DISCONTINUED | OUTPATIENT
Start: 2017-01-01 | End: 2017-01-01 | Stop reason: HOSPADM

## 2017-01-01 RX ORDER — OXYCODONE AND ACETAMINOPHEN 5; 325 MG/1; MG/1
1 TABLET ORAL AS NEEDED
Status: DISCONTINUED | OUTPATIENT
Start: 2017-01-01 | End: 2017-01-01 | Stop reason: HOSPADM

## 2017-01-01 RX ORDER — DEXTROSE MONOHYDRATE 50 MG/ML
25 INJECTION, SOLUTION INTRAVENOUS CONTINUOUS
Status: DISPENSED | OUTPATIENT
Start: 2017-01-01 | End: 2017-01-01

## 2017-01-01 RX ORDER — ONDANSETRON 2 MG/ML
4 INJECTION INTRAMUSCULAR; INTRAVENOUS
Status: DISCONTINUED | OUTPATIENT
Start: 2017-01-01 | End: 2017-01-01 | Stop reason: HOSPADM

## 2017-01-01 RX ORDER — HYDROCORTISONE 25 MG/G
CREAM TOPICAL
Refills: 2 | COMMUNITY
Start: 2017-01-01

## 2017-01-01 RX ORDER — MIDAZOLAM HYDROCHLORIDE 1 MG/ML
INJECTION, SOLUTION INTRAMUSCULAR; INTRAVENOUS AS NEEDED
Status: DISCONTINUED | OUTPATIENT
Start: 2017-01-01 | End: 2017-01-01 | Stop reason: HOSPADM

## 2017-01-01 RX ORDER — SODIUM CHLORIDE 0.9 % (FLUSH) 0.9 %
10 SYRINGE (ML) INJECTION
Status: COMPLETED | OUTPATIENT
Start: 2017-01-01 | End: 2017-01-01

## 2017-01-01 RX ORDER — FLUOROURACIL 50 MG/ML
830 INJECTION, SOLUTION INTRAVENOUS ONCE
Status: COMPLETED | OUTPATIENT
Start: 2017-01-01 | End: 2017-01-01

## 2017-01-01 RX ORDER — SUCCINYLCHOLINE CHLORIDE 20 MG/ML
INJECTION INTRAMUSCULAR; INTRAVENOUS AS NEEDED
Status: DISCONTINUED | OUTPATIENT
Start: 2017-01-01 | End: 2017-01-01 | Stop reason: HOSPADM

## 2017-01-01 RX ORDER — ACETAMINOPHEN 500 MG
500 TABLET ORAL
Status: DISCONTINUED | OUTPATIENT
Start: 2017-01-01 | End: 2017-01-01

## 2017-01-01 RX ORDER — NEOSTIGMINE METHYLSULFATE 1 MG/ML
INJECTION INTRAVENOUS AS NEEDED
Status: DISCONTINUED | OUTPATIENT
Start: 2017-01-01 | End: 2017-01-01 | Stop reason: HOSPADM

## 2017-01-01 RX ORDER — DIPHENHYDRAMINE HYDROCHLORIDE 50 MG/ML
12.5 INJECTION, SOLUTION INTRAMUSCULAR; INTRAVENOUS AS NEEDED
Status: DISCONTINUED | OUTPATIENT
Start: 2017-01-01 | End: 2017-01-01 | Stop reason: HOSPADM

## 2017-01-01 RX ORDER — KETOROLAC TROMETHAMINE 30 MG/ML
15 INJECTION, SOLUTION INTRAMUSCULAR; INTRAVENOUS EVERY 6 HOURS
Status: DISCONTINUED | OUTPATIENT
Start: 2017-01-01 | End: 2017-01-01 | Stop reason: HOSPADM

## 2017-01-01 RX ORDER — DEXAMETHASONE SODIUM PHOSPHATE 4 MG/ML
12 INJECTION, SOLUTION INTRA-ARTICULAR; INTRALESIONAL; INTRAMUSCULAR; INTRAVENOUS; SOFT TISSUE ONCE
Status: COMPLETED | OUTPATIENT
Start: 2017-01-01 | End: 2017-01-01

## 2017-01-01 RX ORDER — DICLOFENAC SODIUM 75 MG/1
TABLET, DELAYED RELEASE ORAL
Refills: 0 | COMMUNITY
Start: 2017-01-01 | End: 2017-01-01

## 2017-01-01 RX ORDER — SODIUM CHLORIDE 0.9 % (FLUSH) 0.9 %
5-10 SYRINGE (ML) INJECTION AS NEEDED
Status: DISCONTINUED | OUTPATIENT
Start: 2017-01-01 | End: 2017-01-01 | Stop reason: HOSPADM

## 2017-01-01 RX ORDER — METOPROLOL TARTRATE 5 MG/5ML
5 INJECTION INTRAVENOUS EVERY 6 HOURS
Status: DISCONTINUED | OUTPATIENT
Start: 2017-01-01 | End: 2017-01-01

## 2017-01-01 RX ORDER — LIDOCAINE AND PRILOCAINE 25; 25 MG/G; MG/G
CREAM TOPICAL AS NEEDED
Qty: 30 G | Refills: 0 | Status: SHIPPED | OUTPATIENT
Start: 2017-01-01

## 2017-01-01 RX ORDER — SODIUM CHLORIDE 0.9 % (FLUSH) 0.9 %
5-10 SYRINGE (ML) INJECTION EVERY 8 HOURS
Status: DISCONTINUED | OUTPATIENT
Start: 2017-01-01 | End: 2017-01-01

## 2017-01-01 RX ORDER — CEPHALEXIN 250 MG/1
500 CAPSULE ORAL 3 TIMES DAILY
Status: DISCONTINUED | OUTPATIENT
Start: 2017-01-01 | End: 2017-01-01 | Stop reason: HOSPADM

## 2017-01-01 RX ORDER — SODIUM CHLORIDE 9 MG/ML
INJECTION INTRAMUSCULAR; INTRAVENOUS; SUBCUTANEOUS
Status: DISPENSED
Start: 2017-01-01 | End: 2017-01-01

## 2017-01-01 RX ORDER — HEPARIN 100 UNIT/ML
300 SYRINGE INTRAVENOUS ONCE
Status: COMPLETED | OUTPATIENT
Start: 2017-01-01 | End: 2017-01-01

## 2017-01-01 RX ORDER — LIDOCAINE HYDROCHLORIDE 10 MG/ML
0.1 INJECTION, SOLUTION EPIDURAL; INFILTRATION; INTRACAUDAL; PERINEURAL AS NEEDED
Status: DISCONTINUED | OUTPATIENT
Start: 2017-01-01 | End: 2017-01-01 | Stop reason: HOSPADM

## 2017-01-01 RX ORDER — SODIUM CHLORIDE 0.9 % (FLUSH) 0.9 %
10-40 SYRINGE (ML) INJECTION AS NEEDED
Status: ACTIVE | OUTPATIENT
Start: 2017-01-01 | End: 2017-01-01

## 2017-01-01 RX ORDER — LIDOCAINE HYDROCHLORIDE 20 MG/ML
20 INJECTION, SOLUTION INFILTRATION; PERINEURAL ONCE
Status: COMPLETED | OUTPATIENT
Start: 2017-01-01 | End: 2017-01-01

## 2017-01-01 RX ORDER — ONDANSETRON 2 MG/ML
4 INJECTION INTRAMUSCULAR; INTRAVENOUS
Status: DISCONTINUED | OUTPATIENT
Start: 2017-01-01 | End: 2017-01-01

## 2017-01-01 RX ORDER — SODIUM CHLORIDE, SODIUM LACTATE, POTASSIUM CHLORIDE, CALCIUM CHLORIDE 600; 310; 30; 20 MG/100ML; MG/100ML; MG/100ML; MG/100ML
25 INJECTION, SOLUTION INTRAVENOUS CONTINUOUS
Status: DISCONTINUED | OUTPATIENT
Start: 2017-01-01 | End: 2017-01-01

## 2017-01-01 RX ORDER — OXYCODONE AND ACETAMINOPHEN 5; 325 MG/1; MG/1
1-2 TABLET ORAL
Status: DISCONTINUED | OUTPATIENT
Start: 2017-01-01 | End: 2017-01-01 | Stop reason: HOSPADM

## 2017-01-01 RX ORDER — PALONOSETRON 0.05 MG/ML
0.25 INJECTION, SOLUTION INTRAVENOUS ONCE
Status: DISCONTINUED | OUTPATIENT
Start: 2017-01-01 | End: 2017-01-01

## 2017-01-01 RX ORDER — GUAIFENESIN 100 MG/5ML
81 LIQUID (ML) ORAL DAILY
Status: DISCONTINUED | OUTPATIENT
Start: 2017-01-01 | End: 2017-01-01 | Stop reason: HOSPADM

## 2017-01-01 RX ORDER — LISINOPRIL 5 MG/1
5 TABLET ORAL DAILY
Status: DISCONTINUED | OUTPATIENT
Start: 2017-01-01 | End: 2017-01-01 | Stop reason: HOSPADM

## 2017-01-01 RX ORDER — CEFAZOLIN SODIUM IN 0.9 % NACL 2 G/100 ML
PLASTIC BAG, INJECTION (ML) INTRAVENOUS
Status: COMPLETED
Start: 2017-01-01 | End: 2017-01-01

## 2017-01-01 RX ORDER — PROCHLORPERAZINE MALEATE 10 MG
5 TABLET ORAL
Qty: 40 TAB | Refills: 1 | Status: SHIPPED | OUTPATIENT
Start: 2017-01-01 | End: 2017-01-01

## 2017-01-01 RX ORDER — ONDANSETRON 2 MG/ML
8 INJECTION INTRAMUSCULAR; INTRAVENOUS ONCE
Status: COMPLETED | OUTPATIENT
Start: 2017-01-01 | End: 2017-01-01

## 2017-01-01 RX ORDER — ATORVASTATIN CALCIUM 40 MG/1
40 TABLET, FILM COATED ORAL DAILY
Status: DISCONTINUED | OUTPATIENT
Start: 2017-01-01 | End: 2017-01-01 | Stop reason: HOSPADM

## 2017-01-01 RX ORDER — GUAIFENESIN 100 MG/5ML
81 LIQUID (ML) ORAL DAILY
COMMUNITY

## 2017-01-01 RX ORDER — HYDROMORPHONE HYDROCHLORIDE 1 MG/ML
INJECTION, SOLUTION INTRAMUSCULAR; INTRAVENOUS; SUBCUTANEOUS AS NEEDED
Status: DISCONTINUED | OUTPATIENT
Start: 2017-01-01 | End: 2017-01-01 | Stop reason: HOSPADM

## 2017-01-01 RX ORDER — LIDOCAINE HYDROCHLORIDE 20 MG/ML
INJECTION, SOLUTION EPIDURAL; INFILTRATION; INTRACAUDAL; PERINEURAL AS NEEDED
Status: DISCONTINUED | OUTPATIENT
Start: 2017-01-01 | End: 2017-01-01 | Stop reason: HOSPADM

## 2017-01-01 RX ORDER — DEXTROSE MONOHYDRATE 50 MG/ML
25 INJECTION, SOLUTION INTRAVENOUS CONTINUOUS
Status: DISCONTINUED | OUTPATIENT
Start: 2017-01-01 | End: 2017-01-01

## 2017-01-01 RX ORDER — FENTANYL CITRATE 50 UG/ML
INJECTION, SOLUTION INTRAMUSCULAR; INTRAVENOUS
Status: COMPLETED
Start: 2017-01-01 | End: 2017-01-01

## 2017-01-01 RX ORDER — HYDROMORPHONE HCL IN 0.9% NACL 15 MG/30ML
PATIENT CONTROLLED ANALGESIA VIAL INTRAVENOUS CONTINUOUS
Status: DISCONTINUED | OUTPATIENT
Start: 2017-01-01 | End: 2017-01-01

## 2017-01-01 RX ORDER — PALONOSETRON 0.05 MG/ML
0.25 INJECTION, SOLUTION INTRAVENOUS ONCE
Status: COMPLETED | OUTPATIENT
Start: 2017-01-01 | End: 2017-01-01

## 2017-01-01 RX ORDER — HYDROMORPHONE HYDROCHLORIDE 1 MG/ML
0.5 INJECTION, SOLUTION INTRAMUSCULAR; INTRAVENOUS; SUBCUTANEOUS
Status: COMPLETED | OUTPATIENT
Start: 2017-01-01 | End: 2017-01-01

## 2017-01-01 RX ORDER — SODIUM CHLORIDE, SODIUM LACTATE, POTASSIUM CHLORIDE, CALCIUM CHLORIDE 600; 310; 30; 20 MG/100ML; MG/100ML; MG/100ML; MG/100ML
25 INJECTION, SOLUTION INTRAVENOUS CONTINUOUS
Status: DISCONTINUED | OUTPATIENT
Start: 2017-01-01 | End: 2017-01-01 | Stop reason: HOSPADM

## 2017-01-01 RX ORDER — ONDANSETRON 2 MG/ML
4 INJECTION INTRAMUSCULAR; INTRAVENOUS AS NEEDED
Status: DISCONTINUED | OUTPATIENT
Start: 2017-01-01 | End: 2017-01-01 | Stop reason: HOSPADM

## 2017-01-01 RX ORDER — SODIUM CHLORIDE, SODIUM LACTATE, POTASSIUM CHLORIDE, CALCIUM CHLORIDE 600; 310; 30; 20 MG/100ML; MG/100ML; MG/100ML; MG/100ML
25 INJECTION, SOLUTION INTRAVENOUS CONTINUOUS
Status: CANCELLED | OUTPATIENT
Start: 2017-01-01

## 2017-01-01 RX ORDER — HYDROMORPHONE HYDROCHLORIDE 1 MG/ML
0.5 INJECTION, SOLUTION INTRAMUSCULAR; INTRAVENOUS; SUBCUTANEOUS
Status: DISCONTINUED | OUTPATIENT
Start: 2017-01-01 | End: 2017-01-01

## 2017-01-01 RX ORDER — METOPROLOL SUCCINATE 25 MG/1
25 TABLET, EXTENDED RELEASE ORAL DAILY
Status: DISCONTINUED | OUTPATIENT
Start: 2017-01-01 | End: 2017-01-01 | Stop reason: HOSPADM

## 2017-01-01 RX ORDER — LIDOCAINE HYDROCHLORIDE AND EPINEPHRINE 10; 10 MG/ML; UG/ML
20 INJECTION, SOLUTION INFILTRATION; PERINEURAL ONCE
Status: COMPLETED | OUTPATIENT
Start: 2017-01-01 | End: 2017-01-01

## 2017-01-01 RX ORDER — SODIUM BICARBONATE 42 MG/ML
INJECTION, SOLUTION INTRAVENOUS ONCE
Status: COMPLETED | OUTPATIENT
Start: 2017-01-01 | End: 2017-01-01

## 2017-01-01 RX ORDER — ONDANSETRON 4 MG/1
4 TABLET, ORALLY DISINTEGRATING ORAL
Qty: 40 TAB | Refills: 1 | Status: SHIPPED | OUTPATIENT
Start: 2017-01-01 | End: 2017-01-01 | Stop reason: ALTCHOICE

## 2017-01-01 RX ORDER — IPRATROPIUM BROMIDE AND ALBUTEROL SULFATE 2.5; .5 MG/3ML; MG/3ML
3 SOLUTION RESPIRATORY (INHALATION)
Status: COMPLETED | OUTPATIENT
Start: 2017-01-01 | End: 2017-01-01

## 2017-01-01 RX ORDER — HYDROMORPHONE HYDROCHLORIDE 2 MG/ML
INJECTION, SOLUTION INTRAMUSCULAR; INTRAVENOUS; SUBCUTANEOUS AS NEEDED
Status: DISCONTINUED | OUTPATIENT
Start: 2017-01-01 | End: 2017-01-01 | Stop reason: HOSPADM

## 2017-01-01 RX ORDER — NALOXONE HYDROCHLORIDE 0.4 MG/ML
0.4 INJECTION, SOLUTION INTRAMUSCULAR; INTRAVENOUS; SUBCUTANEOUS AS NEEDED
Status: DISCONTINUED | OUTPATIENT
Start: 2017-01-01 | End: 2017-01-01 | Stop reason: HOSPADM

## 2017-01-01 RX ORDER — ROCURONIUM BROMIDE 10 MG/ML
INJECTION, SOLUTION INTRAVENOUS AS NEEDED
Status: DISCONTINUED | OUTPATIENT
Start: 2017-01-01 | End: 2017-01-01 | Stop reason: HOSPADM

## 2017-01-01 RX ORDER — DEXAMETHASONE SODIUM PHOSPHATE 4 MG/ML
12 INJECTION, SOLUTION INTRA-ARTICULAR; INTRALESIONAL; INTRAMUSCULAR; INTRAVENOUS; SOFT TISSUE ONCE
Status: DISCONTINUED | OUTPATIENT
Start: 2017-01-01 | End: 2017-01-01

## 2017-01-01 RX ORDER — AMOXICILLIN 250 MG
1 CAPSULE ORAL DAILY
Qty: 30 TAB | Refills: 0 | Status: SHIPPED | OUTPATIENT
Start: 2017-01-01 | End: 2018-01-01

## 2017-01-01 RX ORDER — ALBUMIN HUMAN 50 G/1000ML
SOLUTION INTRAVENOUS AS NEEDED
Status: DISCONTINUED | OUTPATIENT
Start: 2017-01-01 | End: 2017-01-01 | Stop reason: HOSPADM

## 2017-01-01 RX ORDER — ACETAMINOPHEN 325 MG/1
650 TABLET ORAL EVERY 6 HOURS
Status: DISCONTINUED | OUTPATIENT
Start: 2017-01-01 | End: 2017-01-01 | Stop reason: HOSPADM

## 2017-01-01 RX ORDER — FENTANYL CITRATE 50 UG/ML
INJECTION, SOLUTION INTRAMUSCULAR; INTRAVENOUS AS NEEDED
Status: DISCONTINUED | OUTPATIENT
Start: 2017-01-01 | End: 2017-01-01 | Stop reason: HOSPADM

## 2017-01-01 RX ORDER — DEXTROSE, SODIUM CHLORIDE, AND POTASSIUM CHLORIDE 5; .45; .15 G/100ML; G/100ML; G/100ML
INJECTION INTRAVENOUS
Status: COMPLETED
Start: 2017-01-01 | End: 2017-01-01

## 2017-01-01 RX ORDER — OXYCODONE AND ACETAMINOPHEN 5; 325 MG/1; MG/1
1-2 TABLET ORAL
Qty: 20 TAB | Refills: 0 | Status: SHIPPED | OUTPATIENT
Start: 2017-01-01 | End: 2017-01-01

## 2017-01-01 RX ORDER — SODIUM CHLORIDE, SODIUM LACTATE, POTASSIUM CHLORIDE, CALCIUM CHLORIDE 600; 310; 30; 20 MG/100ML; MG/100ML; MG/100ML; MG/100ML
125 INJECTION, SOLUTION INTRAVENOUS CONTINUOUS
Status: DISCONTINUED | OUTPATIENT
Start: 2017-01-01 | End: 2017-01-01 | Stop reason: HOSPADM

## 2017-01-01 RX ORDER — HYDROMORPHONE HYDROCHLORIDE 1 MG/ML
0.2 INJECTION, SOLUTION INTRAMUSCULAR; INTRAVENOUS; SUBCUTANEOUS
Status: DISCONTINUED | OUTPATIENT
Start: 2017-01-01 | End: 2017-01-01 | Stop reason: HOSPADM

## 2017-01-01 RX ORDER — MIDAZOLAM HYDROCHLORIDE 1 MG/ML
0.5 INJECTION, SOLUTION INTRAMUSCULAR; INTRAVENOUS
Status: DISCONTINUED | OUTPATIENT
Start: 2017-01-01 | End: 2017-01-01 | Stop reason: HOSPADM

## 2017-01-01 RX ORDER — ACETAMINOPHEN 10 MG/ML
INJECTION, SOLUTION INTRAVENOUS AS NEEDED
Status: DISCONTINUED | OUTPATIENT
Start: 2017-01-01 | End: 2017-01-01 | Stop reason: HOSPADM

## 2017-01-01 RX ORDER — DEXAMETHASONE SODIUM PHOSPHATE 4 MG/ML
INJECTION, SOLUTION INTRA-ARTICULAR; INTRALESIONAL; INTRAMUSCULAR; INTRAVENOUS; SOFT TISSUE AS NEEDED
Status: DISCONTINUED | OUTPATIENT
Start: 2017-01-01 | End: 2017-01-01 | Stop reason: HOSPADM

## 2017-01-01 RX ORDER — HYDROMORPHONE HYDROCHLORIDE 1 MG/ML
.2-.5 INJECTION, SOLUTION INTRAMUSCULAR; INTRAVENOUS; SUBCUTANEOUS
Status: DISCONTINUED | OUTPATIENT
Start: 2017-01-01 | End: 2017-01-01 | Stop reason: HOSPADM

## 2017-01-01 RX ORDER — ALBUTEROL SULFATE 90 UG/1
AEROSOL, METERED RESPIRATORY (INHALATION) AS NEEDED
Status: DISCONTINUED | OUTPATIENT
Start: 2017-01-01 | End: 2017-01-01 | Stop reason: HOSPADM

## 2017-01-01 RX ORDER — SODIUM CHLORIDE 0.9 % (FLUSH) 0.9 %
10-40 SYRINGE (ML) INJECTION AS NEEDED
Status: DISCONTINUED | OUTPATIENT
Start: 2017-01-01 | End: 2017-01-01 | Stop reason: HOSPADM

## 2017-01-01 RX ORDER — LORATADINE 10 MG/1
10 TABLET ORAL DAILY
COMMUNITY

## 2017-01-01 RX ORDER — SODIUM CHLORIDE 9 MG/ML
10 INJECTION INTRAMUSCULAR; INTRAVENOUS; SUBCUTANEOUS AS NEEDED
Status: DISPENSED | OUTPATIENT
Start: 2017-01-01 | End: 2017-01-01

## 2017-01-01 RX ORDER — HYDROCODONE BITARTRATE AND ACETAMINOPHEN 5; 325 MG/1; MG/1
1-2 TABLET ORAL
Qty: 20 TAB | Refills: 0 | Status: SHIPPED | OUTPATIENT
Start: 2017-01-01 | End: 2017-01-01 | Stop reason: ALTCHOICE

## 2017-01-01 RX ORDER — FENTANYL CITRATE 50 UG/ML
25 INJECTION, SOLUTION INTRAMUSCULAR; INTRAVENOUS
Status: COMPLETED | OUTPATIENT
Start: 2017-01-01 | End: 2017-01-01

## 2017-01-01 RX ORDER — FENTANYL CITRATE 50 UG/ML
50 INJECTION, SOLUTION INTRAMUSCULAR; INTRAVENOUS AS NEEDED
Status: DISCONTINUED | OUTPATIENT
Start: 2017-01-01 | End: 2017-01-01 | Stop reason: HOSPADM

## 2017-01-01 RX ORDER — TIZANIDINE 2 MG/1
TABLET ORAL
Refills: 0 | COMMUNITY
Start: 2017-01-01 | End: 2017-01-01

## 2017-01-01 RX ORDER — ONDANSETRON 2 MG/ML
8 INJECTION INTRAMUSCULAR; INTRAVENOUS ONCE
Status: DISCONTINUED | OUTPATIENT
Start: 2017-01-01 | End: 2017-01-01

## 2017-01-01 RX ORDER — ACETAMINOPHEN 10 MG/ML
1000 INJECTION, SOLUTION INTRAVENOUS EVERY 6 HOURS
Status: DISCONTINUED | OUTPATIENT
Start: 2017-01-01 | End: 2017-01-01 | Stop reason: SDUPTHER

## 2017-01-01 RX ORDER — SODIUM CHLORIDE 0.9 % (FLUSH) 0.9 %
5-10 SYRINGE (ML) INJECTION EVERY 8 HOURS
Status: DISCONTINUED | OUTPATIENT
Start: 2017-01-01 | End: 2017-01-01 | Stop reason: HOSPADM

## 2017-01-01 RX ORDER — DEXTROSE, SODIUM CHLORIDE, AND POTASSIUM CHLORIDE 5; .45; .15 G/100ML; G/100ML; G/100ML
75 INJECTION INTRAVENOUS CONTINUOUS
Status: DISCONTINUED | OUTPATIENT
Start: 2017-01-01 | End: 2017-01-01

## 2017-01-01 RX ORDER — DEXAMETHASONE SODIUM PHOSPHATE 4 MG/ML
12 INJECTION, SOLUTION INTRA-ARTICULAR; INTRALESIONAL; INTRAMUSCULAR; INTRAVENOUS; SOFT TISSUE ONCE
Status: DISCONTINUED | OUTPATIENT
Start: 2017-01-01 | End: 2017-01-01 | Stop reason: SDUPTHER

## 2017-01-01 RX ORDER — DEXTROSE, SODIUM CHLORIDE, AND POTASSIUM CHLORIDE 5; .45; .15 G/100ML; G/100ML; G/100ML
50 INJECTION INTRAVENOUS CONTINUOUS
Status: DISCONTINUED | OUTPATIENT
Start: 2017-01-01 | End: 2017-01-01 | Stop reason: HOSPADM

## 2017-01-01 RX ORDER — SODIUM CHLORIDE 0.9 % (FLUSH) 0.9 %
5-10 SYRINGE (ML) INJECTION AS NEEDED
Status: DISCONTINUED | OUTPATIENT
Start: 2017-01-01 | End: 2017-01-01

## 2017-01-01 RX ORDER — ATROPA BELLADONNA AND OPIUM 16.2; 3 MG/1; MG/1
1 SUPPOSITORY RECTAL ONCE
Status: COMPLETED | OUTPATIENT
Start: 2017-01-01 | End: 2017-01-01

## 2017-01-01 RX ORDER — MORPHINE SULFATE 10 MG/ML
2 INJECTION, SOLUTION INTRAMUSCULAR; INTRAVENOUS
Status: DISCONTINUED | OUTPATIENT
Start: 2017-01-01 | End: 2017-01-01 | Stop reason: HOSPADM

## 2017-01-01 RX ORDER — MIDAZOLAM HYDROCHLORIDE 1 MG/ML
5 INJECTION, SOLUTION INTRAMUSCULAR; INTRAVENOUS
Status: DISCONTINUED | OUTPATIENT
Start: 2017-01-01 | End: 2017-01-01 | Stop reason: HOSPADM

## 2017-01-01 RX ORDER — HYDROCODONE BITARTRATE AND ACETAMINOPHEN 5; 325 MG/1; MG/1
1-2 TABLET ORAL
Status: DISCONTINUED | OUTPATIENT
Start: 2017-01-01 | End: 2017-01-01 | Stop reason: HOSPADM

## 2017-01-01 RX ORDER — ACETAMINOPHEN 325 MG/1
650 TABLET ORAL
Status: DISCONTINUED | OUTPATIENT
Start: 2017-01-01 | End: 2017-01-01 | Stop reason: HOSPADM

## 2017-01-01 RX ORDER — ACETAMINOPHEN 500 MG
1000 TABLET ORAL
Status: DISCONTINUED | OUTPATIENT
Start: 2017-01-01 | End: 2017-01-01 | Stop reason: SDUPTHER

## 2017-01-01 RX ORDER — HYDROMORPHONE HYDROCHLORIDE 1 MG/ML
.5-1 INJECTION, SOLUTION INTRAMUSCULAR; INTRAVENOUS; SUBCUTANEOUS
Status: DISCONTINUED | OUTPATIENT
Start: 2017-01-01 | End: 2017-01-01 | Stop reason: SDUPTHER

## 2017-01-01 RX ORDER — HYDROCORTISONE 25 MG/G
CREAM TOPICAL DAILY
Status: DISCONTINUED | OUTPATIENT
Start: 2017-01-01 | End: 2017-01-01 | Stop reason: HOSPADM

## 2017-01-01 RX ORDER — FENTANYL CITRATE 50 UG/ML
25 INJECTION, SOLUTION INTRAMUSCULAR; INTRAVENOUS
Status: DISCONTINUED | OUTPATIENT
Start: 2017-01-01 | End: 2017-01-01 | Stop reason: HOSPADM

## 2017-01-01 RX ORDER — CEFAZOLIN SODIUM IN 0.9 % NACL 2 G/100 ML
2 PLASTIC BAG, INJECTION (ML) INTRAVENOUS ONCE
Status: COMPLETED | OUTPATIENT
Start: 2017-01-01 | End: 2017-01-01

## 2017-01-01 RX ORDER — SODIUM CHLORIDE 9 MG/ML
25 INJECTION, SOLUTION INTRAVENOUS CONTINUOUS
Status: DISCONTINUED | OUTPATIENT
Start: 2017-01-01 | End: 2017-01-01 | Stop reason: HOSPADM

## 2017-01-01 RX ORDER — HYDROMORPHONE HYDROCHLORIDE 1 MG/ML
INJECTION, SOLUTION INTRAMUSCULAR; INTRAVENOUS; SUBCUTANEOUS
Status: COMPLETED
Start: 2017-01-01 | End: 2017-01-01

## 2017-01-01 RX ORDER — PHENYLEPHRINE HCL IN 0.9% NACL 0.4MG/10ML
SYRINGE (ML) INTRAVENOUS AS NEEDED
Status: DISCONTINUED | OUTPATIENT
Start: 2017-01-01 | End: 2017-01-01 | Stop reason: HOSPADM

## 2017-01-01 RX ORDER — LIDOCAINE HYDROCHLORIDE 20 MG/ML
JELLY TOPICAL ONCE
Status: COMPLETED | OUTPATIENT
Start: 2017-01-01 | End: 2017-01-01

## 2017-01-01 RX ORDER — HEPARIN SODIUM 5000 [USP'U]/ML
5000 INJECTION, SOLUTION INTRAVENOUS; SUBCUTANEOUS ONCE
Status: COMPLETED | OUTPATIENT
Start: 2017-01-01 | End: 2017-01-01

## 2017-01-01 RX ORDER — SODIUM CHLORIDE 9 MG/ML
250 INJECTION, SOLUTION INTRAVENOUS AS NEEDED
Status: DISCONTINUED | OUTPATIENT
Start: 2017-01-01 | End: 2017-01-01 | Stop reason: HOSPADM

## 2017-01-01 RX ORDER — ONDANSETRON 2 MG/ML
INJECTION INTRAMUSCULAR; INTRAVENOUS AS NEEDED
Status: DISCONTINUED | OUTPATIENT
Start: 2017-01-01 | End: 2017-01-01 | Stop reason: HOSPADM

## 2017-01-01 RX ORDER — CEPHALEXIN 500 MG/1
500 CAPSULE ORAL 3 TIMES DAILY
Qty: 30 CAP | Refills: 0 | Status: SHIPPED | OUTPATIENT
Start: 2017-01-01 | End: 2017-01-01

## 2017-01-01 RX ORDER — BUPIVACAINE HYDROCHLORIDE AND EPINEPHRINE 2.5; 5 MG/ML; UG/ML
INJECTION, SOLUTION EPIDURAL; INFILTRATION; INTRACAUDAL; PERINEURAL AS NEEDED
Status: DISCONTINUED | OUTPATIENT
Start: 2017-01-01 | End: 2017-01-01 | Stop reason: HOSPADM

## 2017-01-01 RX ORDER — DEXTROSE, SODIUM CHLORIDE, AND POTASSIUM CHLORIDE 5; .45; .15 G/100ML; G/100ML; G/100ML
125 INJECTION INTRAVENOUS CONTINUOUS
Status: DISCONTINUED | OUTPATIENT
Start: 2017-01-01 | End: 2017-01-01

## 2017-01-01 RX ORDER — SILDENAFIL 50 MG/1
TABLET, FILM COATED ORAL AS NEEDED
COMMUNITY
End: 2017-01-01

## 2017-01-01 RX ORDER — LORATADINE 10 MG/1
10 TABLET ORAL DAILY
Status: DISCONTINUED | OUTPATIENT
Start: 2017-01-01 | End: 2017-01-01 | Stop reason: HOSPADM

## 2017-01-01 RX ORDER — SODIUM CHLORIDE 0.9 % (FLUSH) 0.9 %
10-40 SYRINGE (ML) INJECTION AS NEEDED
Status: CANCELLED | OUTPATIENT
Start: 2017-01-01 | End: 2017-01-01

## 2017-01-01 RX ORDER — OXYBUTYNIN CHLORIDE 5 MG/1
5 TABLET ORAL
Status: DISCONTINUED | OUTPATIENT
Start: 2017-01-01 | End: 2017-01-01 | Stop reason: HOSPADM

## 2017-01-01 RX ORDER — ATORVASTATIN CALCIUM 40 MG/1
40 TABLET, FILM COATED ORAL DAILY
COMMUNITY

## 2017-01-01 RX ORDER — ONDANSETRON 2 MG/ML
8 INJECTION INTRAMUSCULAR; INTRAVENOUS ONCE
Status: ACTIVE | OUTPATIENT
Start: 2017-01-01 | End: 2017-01-01

## 2017-01-01 RX ORDER — OXYCODONE AND ACETAMINOPHEN 5; 325 MG/1; MG/1
1-2 TABLET ORAL
Qty: 30 TAB | Refills: 0 | Status: SHIPPED | OUTPATIENT
Start: 2017-01-01 | End: 2018-01-01

## 2017-01-01 RX ORDER — LISINOPRIL 5 MG/1
5 TABLET ORAL DAILY
COMMUNITY

## 2017-01-01 RX ORDER — SODIUM CHLORIDE 9 MG/ML
10 INJECTION INTRAMUSCULAR; INTRAVENOUS; SUBCUTANEOUS AS NEEDED
Status: CANCELLED | OUTPATIENT
Start: 2017-01-01 | End: 2017-01-01

## 2017-01-01 RX ORDER — BUPIVACAINE HYDROCHLORIDE 5 MG/ML
30 INJECTION, SOLUTION EPIDURAL; INTRACAUDAL ONCE
Status: COMPLETED | OUTPATIENT
Start: 2017-01-01 | End: 2017-01-01

## 2017-01-01 RX ORDER — HYDROMORPHONE HYDROCHLORIDE 2 MG/ML
.2-.5 INJECTION, SOLUTION INTRAMUSCULAR; INTRAVENOUS; SUBCUTANEOUS
Status: DISCONTINUED | OUTPATIENT
Start: 2017-01-01 | End: 2017-01-01 | Stop reason: HOSPADM

## 2017-01-01 RX ORDER — FENTANYL CITRATE 50 UG/ML
100 INJECTION, SOLUTION INTRAMUSCULAR; INTRAVENOUS
Status: DISCONTINUED | OUTPATIENT
Start: 2017-01-01 | End: 2017-01-01 | Stop reason: HOSPADM

## 2017-01-01 RX ORDER — IPRATROPIUM BROMIDE AND ALBUTEROL SULFATE 2.5; .5 MG/3ML; MG/3ML
SOLUTION RESPIRATORY (INHALATION)
Status: COMPLETED
Start: 2017-01-01 | End: 2017-01-01

## 2017-01-01 RX ORDER — ACETAMINOPHEN 500 MG
1000 TABLET ORAL ONCE
Status: COMPLETED | OUTPATIENT
Start: 2017-01-01 | End: 2017-01-01

## 2017-01-01 RX ORDER — MORPHINE SULFATE 10 MG/ML
2 INJECTION, SOLUTION INTRAMUSCULAR; INTRAVENOUS
Status: DISCONTINUED | OUTPATIENT
Start: 2017-01-01 | End: 2017-01-01

## 2017-01-01 RX ORDER — SILDENAFIL CITRATE 20 MG/1
TABLET ORAL
Refills: 12 | COMMUNITY
Start: 2017-01-01

## 2017-01-01 RX ORDER — OXYCODONE HYDROCHLORIDE 5 MG/1
5 TABLET ORAL
Status: DISCONTINUED | OUTPATIENT
Start: 2017-01-01 | End: 2017-01-01 | Stop reason: HOSPADM

## 2017-01-01 RX ORDER — BACITRACIN ZINC 500 UNIT/G
OINTMENT (GRAM) TOPICAL AS NEEDED
Status: DISCONTINUED | OUTPATIENT
Start: 2017-01-01 | End: 2017-01-01 | Stop reason: HOSPADM

## 2017-01-01 RX ORDER — ONDANSETRON 4 MG/1
4 TABLET, ORALLY DISINTEGRATING ORAL
Status: DISCONTINUED | OUTPATIENT
Start: 2017-01-01 | End: 2017-01-01 | Stop reason: HOSPADM

## 2017-01-01 RX ORDER — BARIUM SULFATE 20 MG/ML
900 SUSPENSION ORAL
Status: COMPLETED | OUTPATIENT
Start: 2017-01-01 | End: 2017-01-01

## 2017-01-01 RX ORDER — HEPARIN SODIUM 200 [USP'U]/100ML
400 INJECTION, SOLUTION INTRAVENOUS ONCE
Status: COMPLETED | OUTPATIENT
Start: 2017-01-01 | End: 2017-01-01

## 2017-01-01 RX ADMIN — Medication 500 UNITS: at 10:44

## 2017-01-01 RX ADMIN — LIDOCAINE HYDROCHLORIDE 100 MG: 20 INJECTION, SOLUTION EPIDURAL; INFILTRATION; INTRACAUDAL; PERINEURAL at 07:35

## 2017-01-01 RX ADMIN — Medication 500 UNITS: at 10:05

## 2017-01-01 RX ADMIN — METOPROLOL SUCCINATE 25 MG: 25 TABLET, EXTENDED RELEASE ORAL at 08:31

## 2017-01-01 RX ADMIN — HYDROMORPHONE HYDROCHLORIDE 1 MG: 2 INJECTION INTRAMUSCULAR; INTRAVENOUS; SUBCUTANEOUS at 09:34

## 2017-01-01 RX ADMIN — NEOSTIGMINE METHYLSULFATE 2.5 MG: 1 INJECTION INTRAVENOUS at 17:28

## 2017-01-01 RX ADMIN — Medication 10 ML: at 10:44

## 2017-01-01 RX ADMIN — METOPROLOL TARTRATE 5 MG: 5 INJECTION INTRAVENOUS at 21:18

## 2017-01-01 RX ADMIN — LIDOCAINE HYDROCHLORIDE 8 ML: 20 INJECTION, SOLUTION INFILTRATION; PERINEURAL at 11:35

## 2017-01-01 RX ADMIN — ACETAMINOPHEN 1000 MG: 10 INJECTION, SOLUTION INTRAVENOUS at 17:57

## 2017-01-01 RX ADMIN — ACETAMINOPHEN 500 MG: 500 TABLET ORAL at 02:42

## 2017-01-01 RX ADMIN — FENTANYL CITRATE 25 MCG: 50 INJECTION, SOLUTION INTRAMUSCULAR; INTRAVENOUS at 18:45

## 2017-01-01 RX ADMIN — SODIUM BICARBONATE 2 ML: 42 INJECTION, SOLUTION INTRAVENOUS at 11:35

## 2017-01-01 RX ADMIN — METOPROLOL TARTRATE 5 MG: 5 INJECTION INTRAVENOUS at 15:25

## 2017-01-01 RX ADMIN — FENTANYL CITRATE 25 MCG: 50 INJECTION, SOLUTION INTRAMUSCULAR; INTRAVENOUS at 18:10

## 2017-01-01 RX ADMIN — FENTANYL CITRATE 25 MCG: 50 INJECTION, SOLUTION INTRAMUSCULAR; INTRAVENOUS at 18:00

## 2017-01-01 RX ADMIN — Medication 120 MCG: at 07:39

## 2017-01-01 RX ADMIN — DEXTROSE MONOHYDRATE 25 ML/HR: 5 INJECTION, SOLUTION INTRAVENOUS at 08:37

## 2017-01-01 RX ADMIN — DEXTROSE MONOHYDRATE, SODIUM CHLORIDE, AND POTASSIUM CHLORIDE 75 ML/HR: 50; 4.5; 1.49 INJECTION, SOLUTION INTRAVENOUS at 02:24

## 2017-01-01 RX ADMIN — FLUOROURACIL 4990 MG: 50 INJECTION, SOLUTION INTRAVENOUS at 12:15

## 2017-01-01 RX ADMIN — ACETAMINOPHEN 1000 MG: 10 INJECTION, SOLUTION INTRAVENOUS at 12:32

## 2017-01-01 RX ADMIN — PROPOFOL 50 MG: 10 INJECTION, EMULSION INTRAVENOUS at 10:33

## 2017-01-01 RX ADMIN — PALONOSETRON HYDROCHLORIDE 0.25 MG: 0.25 INJECTION INTRAVENOUS at 08:25

## 2017-01-01 RX ADMIN — VECURONIUM BROMIDE FOR INJECTION 2 MG: 1 INJECTION, POWDER, LYOPHILIZED, FOR SOLUTION INTRAVENOUS at 16:26

## 2017-01-01 RX ADMIN — PROPOFOL 150 MG: 10 INJECTION, EMULSION INTRAVENOUS at 12:31

## 2017-01-01 RX ADMIN — SODIUM CHLORIDE, POTASSIUM CHLORIDE, SODIUM LACTATE AND CALCIUM CHLORIDE: 600; 310; 30; 20 INJECTION, SOLUTION INTRAVENOUS at 07:29

## 2017-01-01 RX ADMIN — HYDROMORPHONE HYDROCHLORIDE 1 MG: 2 INJECTION INTRAMUSCULAR; INTRAVENOUS; SUBCUTANEOUS at 05:37

## 2017-01-01 RX ADMIN — PEGFILGRASTIM 6 MG: 6 INJECTION SUBCUTANEOUS at 11:04

## 2017-01-01 RX ADMIN — Medication 10 ML: at 06:13

## 2017-01-01 RX ADMIN — DEXTROSE MONOHYDRATE, SODIUM CHLORIDE, AND POTASSIUM CHLORIDE 125 ML/HR: 50; 4.5; 1.49 INJECTION, SOLUTION INTRAVENOUS at 23:29

## 2017-01-01 RX ADMIN — HEPARIN SODIUM IN SODIUM CHLORIDE 200 UNITS: 200 INJECTION INTRAVENOUS at 11:35

## 2017-01-01 RX ADMIN — Medication 20 ML: at 08:39

## 2017-01-01 RX ADMIN — MIDAZOLAM HYDROCHLORIDE 1 MG: 1 INJECTION INTRAMUSCULAR; INTRAVENOUS at 11:31

## 2017-01-01 RX ADMIN — ROCURONIUM BROMIDE 20 MG: 10 INJECTION, SOLUTION INTRAVENOUS at 12:34

## 2017-01-01 RX ADMIN — METOPROLOL TARTRATE 5 MG: 5 INJECTION INTRAVENOUS at 21:24

## 2017-01-01 RX ADMIN — DEXAMETHASONE SODIUM PHOSPHATE 10 MG: 4 INJECTION, SOLUTION INTRA-ARTICULAR; INTRALESIONAL; INTRAMUSCULAR; INTRAVENOUS; SOFT TISSUE at 13:19

## 2017-01-01 RX ADMIN — LEUCOVORIN CALCIUM 830 MG: 350 INJECTION, POWDER, LYOPHILIZED, FOR SOLUTION INTRAMUSCULAR; INTRAVENOUS at 09:35

## 2017-01-01 RX ADMIN — ATROPA BELLADONNA AND OPIUM 1 SUPPOSITORY: 16.2; 3 SUPPOSITORY RECTAL at 15:18

## 2017-01-01 RX ADMIN — CEPHALEXIN 500 MG: 250 CAPSULE ORAL at 18:14

## 2017-01-01 RX ADMIN — ONDANSETRON 8 MG: 2 INJECTION INTRAMUSCULAR; INTRAVENOUS at 08:38

## 2017-01-01 RX ADMIN — DEXAMETHASONE SODIUM PHOSPHATE 12 MG: 4 INJECTION, SOLUTION INTRAMUSCULAR; INTRAVENOUS at 09:00

## 2017-01-01 RX ADMIN — BARIUM SULFATE 900 ML: 21 SUSPENSION ORAL at 07:57

## 2017-01-01 RX ADMIN — MIDAZOLAM HYDROCHLORIDE 1 MG: 1 INJECTION INTRAMUSCULAR; INTRAVENOUS at 11:38

## 2017-01-01 RX ADMIN — OXYCODONE HYDROCHLORIDE 5 MG: 5 TABLET ORAL at 16:14

## 2017-01-01 RX ADMIN — ONDANSETRON 8 MG: 2 INJECTION INTRAMUSCULAR; INTRAVENOUS at 09:58

## 2017-01-01 RX ADMIN — Medication 500 UNITS: at 10:59

## 2017-01-01 RX ADMIN — KETOROLAC TROMETHAMINE 15 MG: 30 INJECTION, SOLUTION INTRAMUSCULAR at 18:14

## 2017-01-01 RX ADMIN — ACETAMINOPHEN 1000 MG: 500 TABLET, FILM COATED ORAL at 11:28

## 2017-01-01 RX ADMIN — Medication 10 ML: at 20:22

## 2017-01-01 RX ADMIN — SODIUM CHLORIDE, SODIUM LACTATE, POTASSIUM CHLORIDE, CALCIUM CHLORIDE: 600; 310; 30; 20 INJECTION, SOLUTION INTRAVENOUS at 12:31

## 2017-01-01 RX ADMIN — FENTANYL CITRATE 25 MCG: 50 INJECTION, SOLUTION INTRAMUSCULAR; INTRAVENOUS at 11:05

## 2017-01-01 RX ADMIN — FENTANYL CITRATE 25 MCG: 50 INJECTION, SOLUTION INTRAMUSCULAR; INTRAVENOUS at 18:55

## 2017-01-01 RX ADMIN — HYDROMORPHONE HYDROCHLORIDE 0.5 MG: 1 INJECTION, SOLUTION INTRAMUSCULAR; INTRAVENOUS; SUBCUTANEOUS at 22:12

## 2017-01-01 RX ADMIN — ACETAMINOPHEN 1000 MG: 500 TABLET ORAL at 01:46

## 2017-01-01 RX ADMIN — HYDROMORPHONE HYDROCHLORIDE 0.5 MG: 1 INJECTION, SOLUTION INTRAMUSCULAR; INTRAVENOUS; SUBCUTANEOUS at 18:00

## 2017-01-01 RX ADMIN — PIPERACILLIN SODIUM,TAZOBACTAM SODIUM 3.38 G: 3; .375 INJECTION, POWDER, FOR SOLUTION INTRAVENOUS at 02:48

## 2017-01-01 RX ADMIN — ROCURONIUM BROMIDE 50 MG: 10 INJECTION, SOLUTION INTRAVENOUS at 07:46

## 2017-01-01 RX ADMIN — PIPERACILLIN SODIUM,TAZOBACTAM SODIUM 3.38 G: 3; .375 INJECTION, POWDER, FOR SOLUTION INTRAVENOUS at 01:59

## 2017-01-01 RX ADMIN — ACETAMINOPHEN 1000 MG: 500 TABLET, FILM COATED ORAL at 07:32

## 2017-01-01 RX ADMIN — Medication 10 ML: at 14:48

## 2017-01-01 RX ADMIN — SODIUM CHLORIDE 500 ML: 900 INJECTION, SOLUTION INTRAVENOUS at 11:26

## 2017-01-01 RX ADMIN — Medication 10 ML: at 11:30

## 2017-01-01 RX ADMIN — ACETAMINOPHEN 1000 MG: 10 INJECTION, SOLUTION INTRAVENOUS at 12:16

## 2017-01-01 RX ADMIN — Medication 10 ML: at 05:08

## 2017-01-01 RX ADMIN — FENTANYL CITRATE 25 MCG: 50 INJECTION, SOLUTION INTRAMUSCULAR; INTRAVENOUS at 11:10

## 2017-01-01 RX ADMIN — FLUOROURACIL 830 MG: 2.5 INJECTION, SOLUTION INTRAVENOUS at 12:15

## 2017-01-01 RX ADMIN — DEXTROSE MONOHYDRATE, SODIUM CHLORIDE, AND POTASSIUM CHLORIDE 125 ML/HR: 50; 4.5; 1.49 INJECTION, SOLUTION INTRAVENOUS at 15:37

## 2017-01-01 RX ADMIN — PIPERACILLIN SODIUM,TAZOBACTAM SODIUM 3.38 G: 3; .375 INJECTION, POWDER, FOR SOLUTION INTRAVENOUS at 01:04

## 2017-01-01 RX ADMIN — IPRATROPIUM BROMIDE AND ALBUTEROL SULFATE 3 ML: 2.5; .5 SOLUTION RESPIRATORY (INHALATION) at 18:14

## 2017-01-01 RX ADMIN — ACETAMINOPHEN 1000 MG: 10 INJECTION, SOLUTION INTRAVENOUS at 05:46

## 2017-01-01 RX ADMIN — HYDROMORPHONE HYDROCHLORIDE 1 MG: 2 INJECTION INTRAMUSCULAR; INTRAVENOUS; SUBCUTANEOUS at 13:18

## 2017-01-01 RX ADMIN — SODIUM CHLORIDE, POTASSIUM CHLORIDE, SODIUM LACTATE AND CALCIUM CHLORIDE: 600; 310; 30; 20 INJECTION, SOLUTION INTRAVENOUS at 10:35

## 2017-01-01 RX ADMIN — HYDROMORPHONE HYDROCHLORIDE 0.5 MG: 1 INJECTION, SOLUTION INTRAMUSCULAR; INTRAVENOUS; SUBCUTANEOUS at 18:30

## 2017-01-01 RX ADMIN — SUCCINYLCHOLINE CHLORIDE 140 MG: 20 INJECTION INTRAMUSCULAR; INTRAVENOUS at 07:35

## 2017-01-01 RX ADMIN — DEXTROSE MONOHYDRATE, SODIUM CHLORIDE, AND POTASSIUM CHLORIDE 125 ML/HR: 50; 4.5; 1.49 INJECTION, SOLUTION INTRAVENOUS at 01:04

## 2017-01-01 RX ADMIN — IOPAMIDOL 100 ML: 755 INJECTION, SOLUTION INTRAVENOUS at 02:01

## 2017-01-01 RX ADMIN — DEXTROSE MONOHYDRATE, SODIUM CHLORIDE, AND POTASSIUM CHLORIDE 125 ML/HR: 50; 4.5; 1.49 INJECTION, SOLUTION INTRAVENOUS at 08:43

## 2017-01-01 RX ADMIN — FLUOROURACIL 830 MG: 50 INJECTION, SOLUTION INTRAVENOUS at 12:10

## 2017-01-01 RX ADMIN — DEXTROSE MONOHYDRATE, SODIUM CHLORIDE, AND POTASSIUM CHLORIDE 125 ML/HR: 50; 4.5; 1.49 INJECTION, SOLUTION INTRAVENOUS at 07:15

## 2017-01-01 RX ADMIN — LIDOCAINE HYDROCHLORIDE 20 MG: 20 INJECTION, SOLUTION EPIDURAL; INFILTRATION; INTRACAUDAL; PERINEURAL at 15:19

## 2017-01-01 RX ADMIN — DEXTROSE MONOHYDRATE, SODIUM CHLORIDE, AND POTASSIUM CHLORIDE 125 ML/HR: 50; 4.5; 1.49 INJECTION, SOLUTION INTRAVENOUS at 00:34

## 2017-01-01 RX ADMIN — SODIUM CHLORIDE 40 MG: 9 INJECTION INTRAMUSCULAR; INTRAVENOUS; SUBCUTANEOUS at 10:05

## 2017-01-01 RX ADMIN — HYDROMORPHONE HYDROCHLORIDE 0.5 MG: 1 INJECTION, SOLUTION INTRAMUSCULAR; INTRAVENOUS; SUBCUTANEOUS at 04:58

## 2017-01-01 RX ADMIN — IOPAMIDOL 100 ML: 755 INJECTION, SOLUTION INTRAVENOUS at 08:10

## 2017-01-01 RX ADMIN — PEGFILGRASTIM 6 MG: 6 INJECTION SUBCUTANEOUS at 10:47

## 2017-01-01 RX ADMIN — PIPERACILLIN SODIUM,TAZOBACTAM SODIUM 3.38 G: 3; .375 INJECTION, POWDER, FOR SOLUTION INTRAVENOUS at 17:05

## 2017-01-01 RX ADMIN — METOPROLOL TARTRATE 5 MG: 5 INJECTION INTRAVENOUS at 10:13

## 2017-01-01 RX ADMIN — Medication 10 ML: at 08:19

## 2017-01-01 RX ADMIN — Medication 10 ML: at 21:37

## 2017-01-01 RX ADMIN — HYDROMORPHONE HYDROCHLORIDE 0.5 MG: 1 INJECTION, SOLUTION INTRAMUSCULAR; INTRAVENOUS; SUBCUTANEOUS at 00:18

## 2017-01-01 RX ADMIN — Medication 10 ML: at 08:10

## 2017-01-01 RX ADMIN — PIPERACILLIN SODIUM,TAZOBACTAM SODIUM 3.38 G: 3; .375 INJECTION, POWDER, FOR SOLUTION INTRAVENOUS at 18:30

## 2017-01-01 RX ADMIN — ONDANSETRON 8 MG: 2 INJECTION INTRAMUSCULAR; INTRAVENOUS at 08:40

## 2017-01-01 RX ADMIN — SODIUM CHLORIDE 10 ML: 9 INJECTION INTRAMUSCULAR; INTRAVENOUS; SUBCUTANEOUS at 08:31

## 2017-01-01 RX ADMIN — Medication 20 ML: at 08:15

## 2017-01-01 RX ADMIN — SODIUM CHLORIDE, PRESERVATIVE FREE 300 UNITS: 5 INJECTION INTRAVENOUS at 11:35

## 2017-01-01 RX ADMIN — ACETAMINOPHEN 1000 MG: 10 INJECTION, SOLUTION INTRAVENOUS at 06:56

## 2017-01-01 RX ADMIN — Medication 10 ML: at 06:30

## 2017-01-01 RX ADMIN — Medication 10 ML: at 21:18

## 2017-01-01 RX ADMIN — PIPERACILLIN SODIUM,TAZOBACTAM SODIUM 3.38 G: 3; .375 INJECTION, POWDER, FOR SOLUTION INTRAVENOUS at 10:00

## 2017-01-01 RX ADMIN — METOPROLOL TARTRATE 5 MG: 5 INJECTION INTRAVENOUS at 03:02

## 2017-01-01 RX ADMIN — ALBUMIN HUMAN 250 ML: 50 SOLUTION INTRAVENOUS at 08:29

## 2017-01-01 RX ADMIN — PIPERACILLIN SODIUM,TAZOBACTAM SODIUM 3.38 G: 3; .375 INJECTION, POWDER, FOR SOLUTION INTRAVENOUS at 02:51

## 2017-01-01 RX ADMIN — ACETAMINOPHEN 650 MG: 325 TABLET ORAL at 17:12

## 2017-01-01 RX ADMIN — Medication 10 ML: at 02:01

## 2017-01-01 RX ADMIN — SODIUM CHLORIDE 40 MG: 9 INJECTION INTRAMUSCULAR; INTRAVENOUS; SUBCUTANEOUS at 08:23

## 2017-01-01 RX ADMIN — SODIUM CHLORIDE 50 ML/HR: 900 INJECTION, SOLUTION INTRAVENOUS at 08:02

## 2017-01-01 RX ADMIN — Medication 10 ML: at 08:02

## 2017-01-01 RX ADMIN — Medication 20 ML: at 09:10

## 2017-01-01 RX ADMIN — HYDROMORPHONE HYDROCHLORIDE 1 MG: 2 INJECTION INTRAMUSCULAR; INTRAVENOUS; SUBCUTANEOUS at 18:31

## 2017-01-01 RX ADMIN — FENTANYL CITRATE 25 MCG: 50 INJECTION, SOLUTION INTRAMUSCULAR; INTRAVENOUS at 11:31

## 2017-01-01 RX ADMIN — Medication: at 00:33

## 2017-01-01 RX ADMIN — PIPERACILLIN SODIUM,TAZOBACTAM SODIUM 3.38 G: 3; .375 INJECTION, POWDER, FOR SOLUTION INTRAVENOUS at 09:08

## 2017-01-01 RX ADMIN — DEXTROSE MONOHYDRATE, SODIUM CHLORIDE, AND POTASSIUM CHLORIDE 75 ML/HR: 50; 4.5; 1.49 INJECTION, SOLUTION INTRAVENOUS at 02:50

## 2017-01-01 RX ADMIN — FENTANYL CITRATE 50 MCG: 50 INJECTION, SOLUTION INTRAMUSCULAR; INTRAVENOUS at 15:04

## 2017-01-01 RX ADMIN — PIPERACILLIN SODIUM,TAZOBACTAM SODIUM 3.38 G: 3; .375 INJECTION, POWDER, FOR SOLUTION INTRAVENOUS at 01:32

## 2017-01-01 RX ADMIN — HYDROMORPHONE HYDROCHLORIDE 0.5 MG: 1 INJECTION, SOLUTION INTRAMUSCULAR; INTRAVENOUS; SUBCUTANEOUS at 14:53

## 2017-01-01 RX ADMIN — IOPAMIDOL 80 ML: 612 INJECTION, SOLUTION INTRAVENOUS at 08:02

## 2017-01-01 RX ADMIN — FENTANYL CITRATE 25 MCG: 50 INJECTION, SOLUTION INTRAMUSCULAR; INTRAVENOUS at 16:01

## 2017-01-01 RX ADMIN — ONDANSETRON 4 MG: 2 INJECTION INTRAMUSCULAR; INTRAVENOUS at 14:46

## 2017-01-01 RX ADMIN — METOPROLOL TARTRATE 5 MG: 5 INJECTION INTRAVENOUS at 02:51

## 2017-01-01 RX ADMIN — LIDOCAINE HYDROCHLORIDE 40 MG: 20 INJECTION, SOLUTION EPIDURAL; INFILTRATION; INTRACAUDAL; PERINEURAL at 12:31

## 2017-01-01 RX ADMIN — DEXTROSE MONOHYDRATE, SODIUM CHLORIDE, AND POTASSIUM CHLORIDE 75 ML/HR: 50; 4.5; 1.49 INJECTION, SOLUTION INTRAVENOUS at 13:49

## 2017-01-01 RX ADMIN — Medication 10 ML: at 10:59

## 2017-01-01 RX ADMIN — METOPROLOL TARTRATE 5 MG: 5 INJECTION INTRAVENOUS at 20:32

## 2017-01-01 RX ADMIN — LISINOPRIL 5 MG: 5 TABLET ORAL at 09:21

## 2017-01-01 RX ADMIN — FENTANYL CITRATE 25 MCG: 50 INJECTION, SOLUTION INTRAMUSCULAR; INTRAVENOUS at 11:42

## 2017-01-01 RX ADMIN — DEXTROSE MONOHYDRATE, SODIUM CHLORIDE, AND POTASSIUM CHLORIDE 125 ML/HR: 50; 4.5; 1.49 INJECTION, SOLUTION INTRAVENOUS at 19:37

## 2017-01-01 RX ADMIN — OXALIPLATIN 175 MG: 5 INJECTION, SOLUTION, CONCENTRATE INTRAVENOUS at 09:50

## 2017-01-01 RX ADMIN — FENTANYL CITRATE 25 MCG: 50 INJECTION, SOLUTION INTRAMUSCULAR; INTRAVENOUS at 13:23

## 2017-01-01 RX ADMIN — MIDAZOLAM HYDROCHLORIDE 2 MG: 1 INJECTION INTRAMUSCULAR; INTRAVENOUS at 11:27

## 2017-01-01 RX ADMIN — DEXAMETHASONE SODIUM PHOSPHATE 12 MG: 4 INJECTION, SOLUTION INTRA-ARTICULAR; INTRALESIONAL; INTRAMUSCULAR; INTRAVENOUS; SOFT TISSUE at 09:07

## 2017-01-01 RX ADMIN — LEUCOVORIN CALCIUM 830 MG: 350 INJECTION, POWDER, LYOPHILIZED, FOR SOLUTION INTRAMUSCULAR; INTRAVENOUS at 10:24

## 2017-01-01 RX ADMIN — Medication: at 11:51

## 2017-01-01 RX ADMIN — PIPERACILLIN SODIUM,TAZOBACTAM SODIUM 3.38 G: 3; .375 INJECTION, POWDER, FOR SOLUTION INTRAVENOUS at 11:17

## 2017-01-01 RX ADMIN — LORATADINE 10 MG: 10 TABLET ORAL at 09:21

## 2017-01-01 RX ADMIN — DEXTROSE MONOHYDRATE, SODIUM CHLORIDE, AND POTASSIUM CHLORIDE 125 ML/HR: 50; 4.5; 1.49 INJECTION, SOLUTION INTRAVENOUS at 18:03

## 2017-01-01 RX ADMIN — ATORVASTATIN CALCIUM 40 MG: 40 TABLET, FILM COATED ORAL at 14:47

## 2017-01-01 RX ADMIN — LEUCOVORIN CALCIUM 830 MG: 350 INJECTION, POWDER, LYOPHILIZED, FOR SOLUTION INTRAMUSCULAR; INTRAVENOUS at 09:30

## 2017-01-01 RX ADMIN — ALBUMIN HUMAN 250 ML: 50 SOLUTION INTRAVENOUS at 09:44

## 2017-01-01 RX ADMIN — GLYCOPYRROLATE 0.4 MG: 0.2 INJECTION INTRAMUSCULAR; INTRAVENOUS at 10:29

## 2017-01-01 RX ADMIN — HEPARIN SODIUM 5000 UNITS: 5000 INJECTION, SOLUTION INTRAVENOUS; SUBCUTANEOUS at 12:14

## 2017-01-01 RX ADMIN — DEXTROSE MONOHYDRATE, SODIUM CHLORIDE, AND POTASSIUM CHLORIDE 125 ML/HR: 50; 4.5; 1.49 INJECTION, SOLUTION INTRAVENOUS at 10:53

## 2017-01-01 RX ADMIN — FLUOROURACIL 830 MG: 50 INJECTION, SOLUTION INTRAVENOUS at 11:56

## 2017-01-01 RX ADMIN — PIPERACILLIN SODIUM,TAZOBACTAM SODIUM 3.38 G: 3; .375 INJECTION, POWDER, FOR SOLUTION INTRAVENOUS at 10:13

## 2017-01-01 RX ADMIN — HYDROMORPHONE HYDROCHLORIDE 0.5 MG: 1 INJECTION, SOLUTION INTRAMUSCULAR; INTRAVENOUS; SUBCUTANEOUS at 02:23

## 2017-01-01 RX ADMIN — SODIUM CHLORIDE 50 ML/HR: 900 INJECTION, SOLUTION INTRAVENOUS at 07:57

## 2017-01-01 RX ADMIN — HYDROMORPHONE HYDROCHLORIDE 0.5 MG: 1 INJECTION, SOLUTION INTRAMUSCULAR; INTRAVENOUS; SUBCUTANEOUS at 08:20

## 2017-01-01 RX ADMIN — LISINOPRIL 5 MG: 5 TABLET ORAL at 14:47

## 2017-01-01 RX ADMIN — ROCURONIUM BROMIDE 30 MG: 10 INJECTION, SOLUTION INTRAVENOUS at 08:46

## 2017-01-01 RX ADMIN — PIPERACILLIN SODIUM,TAZOBACTAM SODIUM 3.38 G: 3; .375 INJECTION, POWDER, FOR SOLUTION INTRAVENOUS at 17:12

## 2017-01-01 RX ADMIN — Medication 10 ML: at 18:14

## 2017-01-01 RX ADMIN — HYDROMORPHONE HYDROCHLORIDE 1 MG: 2 INJECTION INTRAMUSCULAR; INTRAVENOUS; SUBCUTANEOUS at 01:12

## 2017-01-01 RX ADMIN — PALONOSETRON HYDROCHLORIDE 0.25 MG: 0.25 INJECTION INTRAVENOUS at 08:29

## 2017-01-01 RX ADMIN — HYDROMORPHONE HYDROCHLORIDE 0.5 MG: 1 INJECTION, SOLUTION INTRAMUSCULAR; INTRAVENOUS; SUBCUTANEOUS at 18:15

## 2017-01-01 RX ADMIN — ACETAMINOPHEN 1000 MG: 10 INJECTION, SOLUTION INTRAVENOUS at 12:38

## 2017-01-01 RX ADMIN — PIPERACILLIN SODIUM,TAZOBACTAM SODIUM 3.38 G: 3; .375 INJECTION, POWDER, FOR SOLUTION INTRAVENOUS at 05:08

## 2017-01-01 RX ADMIN — HYDROMORPHONE HYDROCHLORIDE 0.5 MG: 1 INJECTION, SOLUTION INTRAMUSCULAR; INTRAVENOUS; SUBCUTANEOUS at 18:45

## 2017-01-01 RX ADMIN — CHLORASEPTIC 1 SPRAY: 1.5 LIQUID ORAL at 06:42

## 2017-01-01 RX ADMIN — SUCCINYLCHOLINE CHLORIDE 160 MG: 20 INJECTION INTRAMUSCULAR; INTRAVENOUS at 15:19

## 2017-01-01 RX ADMIN — DEXAMETHASONE SODIUM PHOSPHATE 12 MG: 4 INJECTION, SOLUTION INTRA-ARTICULAR; INTRALESIONAL; INTRAMUSCULAR; INTRAVENOUS; SOFT TISSUE at 09:40

## 2017-01-01 RX ADMIN — HYDROMORPHONE HYDROCHLORIDE 0.3 MG: 1 INJECTION, SOLUTION INTRAMUSCULAR; INTRAVENOUS; SUBCUTANEOUS at 12:43

## 2017-01-01 RX ADMIN — Medication 10 ML: at 09:42

## 2017-01-01 RX ADMIN — FENTANYL CITRATE 50 MCG: 50 INJECTION, SOLUTION INTRAMUSCULAR; INTRAVENOUS at 12:31

## 2017-01-01 RX ADMIN — DEXAMETHASONE SODIUM PHOSPHATE 12 MG: 4 INJECTION, SOLUTION INTRA-ARTICULAR; INTRALESIONAL; INTRAMUSCULAR; INTRAVENOUS; SOFT TISSUE at 10:03

## 2017-01-01 RX ADMIN — Medication 10 ML: at 15:15

## 2017-01-01 RX ADMIN — LEUCOVORIN CALCIUM 830 MG: 500 INJECTION, POWDER, LYOPHILIZED, FOR SOLUTION INTRAMUSCULAR; INTRAVENOUS at 09:50

## 2017-01-01 RX ADMIN — Medication 500 UNITS: at 09:42

## 2017-01-01 RX ADMIN — DEXTROSE MONOHYDRATE, SODIUM CHLORIDE, AND POTASSIUM CHLORIDE 125 ML/HR: 50; 4.5; 1.49 INJECTION, SOLUTION INTRAVENOUS at 10:02

## 2017-01-01 RX ADMIN — HYDROMORPHONE HYDROCHLORIDE 0.2 MG: 2 INJECTION, SOLUTION INTRAMUSCULAR; INTRAVENOUS; SUBCUTANEOUS at 12:20

## 2017-01-01 RX ADMIN — FENTANYL CITRATE 25 MCG: 50 INJECTION, SOLUTION INTRAMUSCULAR; INTRAVENOUS at 15:24

## 2017-01-01 RX ADMIN — FENTANYL CITRATE 25 MCG: 50 INJECTION, SOLUTION INTRAMUSCULAR; INTRAVENOUS at 11:35

## 2017-01-01 RX ADMIN — ACETAMINOPHEN 1000 MG: 10 INJECTION, SOLUTION INTRAVENOUS at 00:36

## 2017-01-01 RX ADMIN — MIDAZOLAM HYDROCHLORIDE 1 MG: 1 INJECTION INTRAMUSCULAR; INTRAVENOUS at 11:42

## 2017-01-01 RX ADMIN — Medication 10 ML: at 22:31

## 2017-01-01 RX ADMIN — Medication 10 ML: at 15:34

## 2017-01-01 RX ADMIN — Medication 10 ML: at 20:45

## 2017-01-01 RX ADMIN — HYDROMORPHONE HYDROCHLORIDE 0.5 MG: 1 INJECTION, SOLUTION INTRAMUSCULAR; INTRAVENOUS; SUBCUTANEOUS at 04:28

## 2017-01-01 RX ADMIN — MIDAZOLAM HYDROCHLORIDE 2 MG: 1 INJECTION, SOLUTION INTRAMUSCULAR; INTRAVENOUS at 07:29

## 2017-01-01 RX ADMIN — Medication 10 ML: at 12:09

## 2017-01-01 RX ADMIN — FENTANYL CITRATE 50 MCG: 50 INJECTION, SOLUTION INTRAMUSCULAR; INTRAVENOUS at 07:44

## 2017-01-01 RX ADMIN — SODIUM CHLORIDE 50 ML/HR: 900 INJECTION, SOLUTION INTRAVENOUS at 20:22

## 2017-01-01 RX ADMIN — ACETAMINOPHEN 1000 MG: 10 INJECTION, SOLUTION INTRAVENOUS at 00:59

## 2017-01-01 RX ADMIN — Medication 10 ML: at 17:15

## 2017-01-01 RX ADMIN — METOPROLOL TARTRATE 5 MG: 5 INJECTION INTRAVENOUS at 22:26

## 2017-01-01 RX ADMIN — ROCURONIUM BROMIDE 20 MG: 10 INJECTION, SOLUTION INTRAVENOUS at 09:30

## 2017-01-01 RX ADMIN — SODIUM CHLORIDE 40 MG: 9 INJECTION INTRAMUSCULAR; INTRAVENOUS; SUBCUTANEOUS at 10:28

## 2017-01-01 RX ADMIN — ROCURONIUM BROMIDE 10 MG: 10 INJECTION, SOLUTION INTRAVENOUS at 15:19

## 2017-01-01 RX ADMIN — Medication 10 ML: at 08:12

## 2017-01-01 RX ADMIN — SODIUM CHLORIDE 1000 ML: 900 INJECTION, SOLUTION INTRAVENOUS at 18:41

## 2017-01-01 RX ADMIN — PIPERACILLIN SODIUM,TAZOBACTAM SODIUM 3.38 G: 3; .375 INJECTION, POWDER, FOR SOLUTION INTRAVENOUS at 17:36

## 2017-01-01 RX ADMIN — DEXTROSE MONOHYDRATE, SODIUM CHLORIDE, AND POTASSIUM CHLORIDE 125 ML/HR: 50; 4.5; 1.49 INJECTION, SOLUTION INTRAVENOUS at 01:58

## 2017-01-01 RX ADMIN — OXALIPLATIN 130 MG: 5 INJECTION, SOLUTION INTRAVENOUS at 10:00

## 2017-01-01 RX ADMIN — Medication 10 ML: at 06:57

## 2017-01-01 RX ADMIN — FENTANYL CITRATE 50 MCG: 50 INJECTION, SOLUTION INTRAMUSCULAR; INTRAVENOUS at 11:27

## 2017-01-01 RX ADMIN — METOPROLOL TARTRATE 5 MG: 5 INJECTION INTRAVENOUS at 05:16

## 2017-01-01 RX ADMIN — ROCURONIUM BROMIDE 15 MG: 10 INJECTION, SOLUTION INTRAVENOUS at 15:35

## 2017-01-01 RX ADMIN — Medication 500 UNITS: at 09:10

## 2017-01-01 RX ADMIN — OXYCODONE HYDROCHLORIDE 5 MG: 5 TABLET ORAL at 11:29

## 2017-01-01 RX ADMIN — FLUOROURACIL 830 MG: 50 INJECTION, SOLUTION INTRAVENOUS at 11:53

## 2017-01-01 RX ADMIN — IPRATROPIUM BROMIDE AND ALBUTEROL SULFATE 3 ML: .5; 3 SOLUTION RESPIRATORY (INHALATION) at 18:14

## 2017-01-01 RX ADMIN — Medication 10 ML: at 07:57

## 2017-01-01 RX ADMIN — HYDROMORPHONE HYDROCHLORIDE 0.5 MG: 1 INJECTION, SOLUTION INTRAMUSCULAR; INTRAVENOUS; SUBCUTANEOUS at 06:29

## 2017-01-01 RX ADMIN — HYDROCORTISONE: 25 CREAM TOPICAL at 09:24

## 2017-01-01 RX ADMIN — Medication 10 ML: at 03:03

## 2017-01-01 RX ADMIN — Medication 10 ML: at 15:30

## 2017-01-01 RX ADMIN — Medication: at 10:46

## 2017-01-01 RX ADMIN — SODIUM CHLORIDE, SODIUM LACTATE, POTASSIUM CHLORIDE, CALCIUM CHLORIDE: 600; 310; 30; 20 INJECTION, SOLUTION INTRAVENOUS at 14:20

## 2017-01-01 RX ADMIN — OXALIPLATIN 130 MG: 5 INJECTION, SOLUTION, CONCENTRATE INTRAVENOUS at 09:35

## 2017-01-01 RX ADMIN — Medication 10 ML: at 20:33

## 2017-01-01 RX ADMIN — FENTANYL CITRATE 100 MCG: 50 INJECTION, SOLUTION INTRAMUSCULAR; INTRAVENOUS at 15:19

## 2017-01-01 RX ADMIN — DEXTROSE MONOHYDRATE, SODIUM CHLORIDE, AND POTASSIUM CHLORIDE 125 ML/HR: 50; 4.5; 1.49 INJECTION, SOLUTION INTRAVENOUS at 00:38

## 2017-01-01 RX ADMIN — SODIUM CHLORIDE 50 ML/HR: 900 INJECTION, SOLUTION INTRAVENOUS at 08:10

## 2017-01-01 RX ADMIN — Medication 500 UNITS: at 12:09

## 2017-01-01 RX ADMIN — FENTANYL CITRATE 25 MCG: 50 INJECTION, SOLUTION INTRAMUSCULAR; INTRAVENOUS at 18:05

## 2017-01-01 RX ADMIN — PIPERACILLIN SODIUM,TAZOBACTAM SODIUM 3.38 G: 3; .375 INJECTION, POWDER, FOR SOLUTION INTRAVENOUS at 10:05

## 2017-01-01 RX ADMIN — ACETAMINOPHEN 650 MG: 325 TABLET, FILM COATED ORAL at 14:49

## 2017-01-01 RX ADMIN — Medication 1 CAPSULE: at 11:51

## 2017-01-01 RX ADMIN — SODIUM CHLORIDE, SODIUM LACTATE, POTASSIUM CHLORIDE, CALCIUM CHLORIDE: 600; 310; 30; 20 INJECTION, SOLUTION INTRAVENOUS at 15:49

## 2017-01-01 RX ADMIN — ACETAMINOPHEN 1000 MG: 10 INJECTION, SOLUTION INTRAVENOUS at 18:30

## 2017-01-01 RX ADMIN — DEXTROSE MONOHYDRATE 25 ML/HR: 5 INJECTION, SOLUTION INTRAVENOUS at 09:56

## 2017-01-01 RX ADMIN — Medication 10 ML: at 21:30

## 2017-01-01 RX ADMIN — ACETAMINOPHEN 1000 MG: 10 INJECTION, SOLUTION INTRAVENOUS at 00:20

## 2017-01-01 RX ADMIN — FLUOROURACIL 4990 MG: 50 INJECTION, SOLUTION INTRAVENOUS at 11:55

## 2017-01-01 RX ADMIN — ROCURONIUM BROMIDE 10 MG: 10 INJECTION, SOLUTION INTRAVENOUS at 14:30

## 2017-01-01 RX ADMIN — PIPERACILLIN SODIUM,TAZOBACTAM SODIUM 3.38 G: 3; .375 INJECTION, POWDER, FOR SOLUTION INTRAVENOUS at 18:22

## 2017-01-01 RX ADMIN — CEFAZOLIN 2 G: 10 INJECTION, POWDER, FOR SOLUTION INTRAVENOUS; PARENTERAL at 11:09

## 2017-01-01 RX ADMIN — FENTANYL CITRATE 25 MCG: 50 INJECTION, SOLUTION INTRAMUSCULAR; INTRAVENOUS at 18:35

## 2017-01-01 RX ADMIN — MIDAZOLAM HYDROCHLORIDE 1 MG: 1 INJECTION INTRAMUSCULAR; INTRAVENOUS at 11:33

## 2017-01-01 RX ADMIN — FENTANYL CITRATE 50 MCG: 50 INJECTION, SOLUTION INTRAMUSCULAR; INTRAVENOUS at 07:55

## 2017-01-01 RX ADMIN — ONDANSETRON 4 MG: 2 INJECTION INTRAMUSCULAR; INTRAVENOUS at 07:53

## 2017-01-01 RX ADMIN — LIDOCAINE HYDROCHLORIDE AND EPINEPHRINE 10 ML: 10; 10 INJECTION, SOLUTION INFILTRATION; PERINEURAL at 11:35

## 2017-01-01 RX ADMIN — ACETAMINOPHEN 1000 MG: 10 INJECTION, SOLUTION INTRAVENOUS at 23:07

## 2017-01-01 RX ADMIN — ACETAMINOPHEN 1000 MG: 10 INJECTION, SOLUTION INTRAVENOUS at 07:47

## 2017-01-01 RX ADMIN — PIPERACILLIN SODIUM,TAZOBACTAM SODIUM 3.38 G: 3; .375 INJECTION, POWDER, FOR SOLUTION INTRAVENOUS at 02:57

## 2017-01-01 RX ADMIN — POTASSIUM CHLORIDE, DEXTROSE MONOHYDRATE AND SODIUM CHLORIDE 125 ML/HR: 150; 5; 450 INJECTION, SOLUTION INTRAVENOUS at 04:58

## 2017-01-01 RX ADMIN — OXYCODONE HYDROCHLORIDE 5 MG: 5 TABLET ORAL at 21:36

## 2017-01-01 RX ADMIN — HYDROMORPHONE HYDROCHLORIDE 0.2 MG: 1 INJECTION, SOLUTION INTRAMUSCULAR; INTRAVENOUS; SUBCUTANEOUS at 13:19

## 2017-01-01 RX ADMIN — ACETAMINOPHEN 1000 MG: 10 INJECTION, SOLUTION INTRAVENOUS at 05:08

## 2017-01-01 RX ADMIN — ONDANSETRON 4 MG: 2 INJECTION INTRAMUSCULAR; INTRAVENOUS at 18:52

## 2017-01-01 RX ADMIN — Medication 10 ML: at 11:53

## 2017-01-01 RX ADMIN — MIDAZOLAM HYDROCHLORIDE 2 MG: 1 INJECTION, SOLUTION INTRAMUSCULAR; INTRAVENOUS at 15:12

## 2017-01-01 RX ADMIN — DEXTROSE MONOHYDRATE 25 ML/HR: 5 INJECTION, SOLUTION INTRAVENOUS at 08:20

## 2017-01-01 RX ADMIN — METOPROLOL TARTRATE 5 MG: 5 INJECTION INTRAVENOUS at 08:23

## 2017-01-01 RX ADMIN — NEOSTIGMINE METHYLSULFATE 3 MG: 1 INJECTION INTRAVENOUS at 15:13

## 2017-01-01 RX ADMIN — FENTANYL CITRATE 25 MCG: 50 INJECTION, SOLUTION INTRAMUSCULAR; INTRAVENOUS at 18:30

## 2017-01-01 RX ADMIN — ACETAMINOPHEN 1000 MG: 10 INJECTION, SOLUTION INTRAVENOUS at 23:50

## 2017-01-01 RX ADMIN — PIPERACILLIN SODIUM,TAZOBACTAM SODIUM 3.38 G: 3; .375 INJECTION, POWDER, FOR SOLUTION INTRAVENOUS at 09:56

## 2017-01-01 RX ADMIN — HYDROMORPHONE HYDROCHLORIDE 0.5 MG: 1 INJECTION, SOLUTION INTRAMUSCULAR; INTRAVENOUS; SUBCUTANEOUS at 10:25

## 2017-01-01 RX ADMIN — DEXTROSE MONOHYDRATE 25 ML/HR: 5 INJECTION, SOLUTION INTRAVENOUS at 08:28

## 2017-01-01 RX ADMIN — ROCURONIUM BROMIDE 10 MG: 10 INJECTION, SOLUTION INTRAVENOUS at 12:31

## 2017-01-01 RX ADMIN — SODIUM CHLORIDE, SODIUM LACTATE, POTASSIUM CHLORIDE, CALCIUM CHLORIDE: 600; 310; 30; 20 INJECTION, SOLUTION INTRAVENOUS at 16:57

## 2017-01-01 RX ADMIN — ROCURONIUM BROMIDE 20 MG: 10 INJECTION, SOLUTION INTRAVENOUS at 13:47

## 2017-01-01 RX ADMIN — LIDOCAINE HYDROCHLORIDE: 20 JELLY TOPICAL at 02:15

## 2017-01-01 RX ADMIN — FLUOROURACIL 4990 MG: 50 INJECTION, SOLUTION INTRAVENOUS at 11:45

## 2017-01-01 RX ADMIN — METOPROLOL TARTRATE 5 MG: 5 INJECTION INTRAVENOUS at 15:15

## 2017-01-01 RX ADMIN — CEFOTETAN DISODIUM 2 G: 2 INJECTION, POWDER, FOR SOLUTION INTRAMUSCULAR; INTRAVENOUS at 07:39

## 2017-01-01 RX ADMIN — Medication 10 ML: at 10:53

## 2017-01-01 RX ADMIN — Medication: at 08:24

## 2017-01-01 RX ADMIN — Medication 10 ML: at 06:41

## 2017-01-01 RX ADMIN — Medication 10 ML: at 12:19

## 2017-01-01 RX ADMIN — HYDROMORPHONE HYDROCHLORIDE 0.5 MG: 1 INJECTION, SOLUTION INTRAMUSCULAR; INTRAVENOUS; SUBCUTANEOUS at 08:30

## 2017-01-01 RX ADMIN — Medication 10 ML: at 10:05

## 2017-01-01 RX ADMIN — PIPERACILLIN SODIUM,TAZOBACTAM SODIUM 3.38 G: 3; .375 INJECTION, POWDER, FOR SOLUTION INTRAVENOUS at 17:46

## 2017-01-01 RX ADMIN — DEXTROSE MONOHYDRATE, SODIUM CHLORIDE, AND POTASSIUM CHLORIDE 125 ML/HR: 50; 4.5; 1.49 INJECTION, SOLUTION INTRAVENOUS at 21:37

## 2017-01-01 RX ADMIN — ACETAMINOPHEN 500 MG: 500 TABLET ORAL at 08:28

## 2017-01-01 RX ADMIN — ACETAMINOPHEN 650 MG: 325 TABLET ORAL at 11:51

## 2017-01-01 RX ADMIN — ACETAMINOPHEN 1000 MG: 10 INJECTION, SOLUTION INTRAVENOUS at 10:53

## 2017-01-01 RX ADMIN — ACETAMINOPHEN 1000 MG: 500 TABLET, FILM COATED ORAL at 01:00

## 2017-01-01 RX ADMIN — ALBUTEROL SULFATE 3 PUFF: 90 AEROSOL, METERED RESPIRATORY (INHALATION) at 08:12

## 2017-01-01 RX ADMIN — CEPHALEXIN 500 MG: 250 CAPSULE ORAL at 21:30

## 2017-01-01 RX ADMIN — PIPERACILLIN SODIUM,TAZOBACTAM SODIUM 3.38 G: 3; .375 INJECTION, POWDER, FOR SOLUTION INTRAVENOUS at 17:57

## 2017-01-01 RX ADMIN — OXYCODONE HYDROCHLORIDE 5 MG: 5 TABLET ORAL at 15:14

## 2017-01-01 RX ADMIN — DEXAMETHASONE SODIUM PHOSPHATE 8 MG: 4 INJECTION, SOLUTION INTRA-ARTICULAR; INTRALESIONAL; INTRAMUSCULAR; INTRAVENOUS; SOFT TISSUE at 07:35

## 2017-01-01 RX ADMIN — FENTANYL CITRATE 50 MCG: 50 INJECTION, SOLUTION INTRAMUSCULAR; INTRAVENOUS at 08:34

## 2017-01-01 RX ADMIN — OXYCODONE HYDROCHLORIDE 5 MG: 5 TABLET ORAL at 06:34

## 2017-01-01 RX ADMIN — ONDANSETRON 8 MG: 2 INJECTION INTRAMUSCULAR; INTRAVENOUS at 08:20

## 2017-01-01 RX ADMIN — OXALIPLATIN 175 MG: 5 INJECTION, SOLUTION, CONCENTRATE INTRAVENOUS at 10:24

## 2017-01-01 RX ADMIN — Medication 10 ML: at 20:07

## 2017-01-01 RX ADMIN — Medication 10 ML: at 04:58

## 2017-01-01 RX ADMIN — METOPROLOL TARTRATE 5 MG: 5 INJECTION INTRAVENOUS at 12:38

## 2017-01-01 RX ADMIN — DEXTROSE MONOHYDRATE, SODIUM CHLORIDE, AND POTASSIUM CHLORIDE 125 ML/HR: 50; 4.5; 1.49 INJECTION, SOLUTION INTRAVENOUS at 17:15

## 2017-01-01 RX ADMIN — HYDROMORPHONE HYDROCHLORIDE 0.5 MG: 1 INJECTION, SOLUTION INTRAMUSCULAR; INTRAVENOUS; SUBCUTANEOUS at 18:42

## 2017-01-01 RX ADMIN — Medication: at 07:43

## 2017-01-01 RX ADMIN — METOPROLOL SUCCINATE 25 MG: 25 TABLET, EXTENDED RELEASE ORAL at 09:21

## 2017-01-01 RX ADMIN — FENTANYL CITRATE 25 MCG: 50 INJECTION, SOLUTION INTRAMUSCULAR; INTRAVENOUS at 11:25

## 2017-01-01 RX ADMIN — LISINOPRIL 5 MG: 5 TABLET ORAL at 08:31

## 2017-01-01 RX ADMIN — FLUOROURACIL 830 MG: 50 INJECTION, SOLUTION INTRAVENOUS at 11:41

## 2017-01-01 RX ADMIN — HYDROMORPHONE HYDROCHLORIDE 0.2 MG: 2 INJECTION, SOLUTION INTRAMUSCULAR; INTRAVENOUS; SUBCUTANEOUS at 12:47

## 2017-01-01 RX ADMIN — Medication 10 ML: at 05:44

## 2017-01-01 RX ADMIN — PROPOFOL 150 MG: 10 INJECTION, EMULSION INTRAVENOUS at 07:35

## 2017-01-01 RX ADMIN — FLUOROURACIL 830 MG: 50 INJECTION, SOLUTION INTRAVENOUS at 12:25

## 2017-01-01 RX ADMIN — VECURONIUM BROMIDE FOR INJECTION 4 MG: 1 INJECTION, POWDER, LYOPHILIZED, FOR SOLUTION INTRAVENOUS at 15:38

## 2017-01-01 RX ADMIN — HYDROMORPHONE HYDROCHLORIDE 0.5 MG: 1 INJECTION, SOLUTION INTRAMUSCULAR; INTRAVENOUS; SUBCUTANEOUS at 11:21

## 2017-01-01 RX ADMIN — OXYCODONE HYDROCHLORIDE 5 MG: 5 TABLET ORAL at 02:18

## 2017-01-01 RX ADMIN — ATORVASTATIN CALCIUM 40 MG: 40 TABLET, FILM COATED ORAL at 08:30

## 2017-01-01 RX ADMIN — ROCURONIUM BROMIDE 20 MG: 10 INJECTION, SOLUTION INTRAVENOUS at 13:15

## 2017-01-01 RX ADMIN — SODIUM CHLORIDE, SODIUM LACTATE, POTASSIUM CHLORIDE, AND CALCIUM CHLORIDE 25 ML/HR: 600; 310; 30; 20 INJECTION, SOLUTION INTRAVENOUS at 07:09

## 2017-01-01 RX ADMIN — FENTANYL CITRATE 25 MCG: 50 INJECTION, SOLUTION INTRAMUSCULAR; INTRAVENOUS at 18:15

## 2017-01-01 RX ADMIN — METOPROLOL TARTRATE 5 MG: 5 INJECTION INTRAVENOUS at 10:05

## 2017-01-01 RX ADMIN — GLYCOPYRROLATE 0.5 MG: 0.2 INJECTION INTRAMUSCULAR; INTRAVENOUS at 17:28

## 2017-01-01 RX ADMIN — MIDAZOLAM HYDROCHLORIDE 2 MG: 1 INJECTION, SOLUTION INTRAMUSCULAR; INTRAVENOUS at 12:31

## 2017-01-01 RX ADMIN — OXALIPLATIN 130 MG: 5 INJECTION, SOLUTION, CONCENTRATE INTRAVENOUS at 09:40

## 2017-01-01 RX ADMIN — DEXAMETHASONE SODIUM PHOSPHATE 12 MG: 4 INJECTION, SOLUTION INTRAMUSCULAR; INTRAVENOUS at 08:55

## 2017-01-01 RX ADMIN — ACETAMINOPHEN 1000 MG: 10 INJECTION, SOLUTION INTRAVENOUS at 06:14

## 2017-01-01 RX ADMIN — Medication 10 ML: at 21:23

## 2017-01-01 RX ADMIN — VECURONIUM BROMIDE FOR INJECTION 2 MG: 1 INJECTION, POWDER, LYOPHILIZED, FOR SOLUTION INTRAVENOUS at 16:55

## 2017-01-01 RX ADMIN — DEXTROSE MONOHYDRATE 25 ML/HR: 5 INJECTION, SOLUTION INTRAVENOUS at 08:26

## 2017-01-01 RX ADMIN — OXALIPLATIN 175 MG: 5 INJECTION, SOLUTION, CONCENTRATE INTRAVENOUS at 09:30

## 2017-01-01 RX ADMIN — CEPHALEXIN 500 MG: 250 CAPSULE ORAL at 08:30

## 2017-01-01 RX ADMIN — DEXAMETHASONE SODIUM PHOSPHATE 12 MG: 4 INJECTION, SOLUTION INTRA-ARTICULAR; INTRALESIONAL; INTRAMUSCULAR; INTRAVENOUS; SOFT TISSUE at 08:41

## 2017-01-01 RX ADMIN — Medication 10 ML: at 05:16

## 2017-01-01 RX ADMIN — ACETAMINOPHEN 1000 MG: 10 INJECTION, SOLUTION INTRAVENOUS at 06:29

## 2017-01-01 RX ADMIN — DEXTROSE MONOHYDRATE, SODIUM CHLORIDE, AND POTASSIUM CHLORIDE 75 ML/HR: 50; 4.5; 1.49 INJECTION, SOLUTION INTRAVENOUS at 15:05

## 2017-01-01 RX ADMIN — NEOSTIGMINE METHYLSULFATE 3 MG: 1 INJECTION INTRAVENOUS at 10:29

## 2017-01-01 RX ADMIN — METOPROLOL TARTRATE 5 MG: 5 INJECTION INTRAVENOUS at 02:59

## 2017-01-01 RX ADMIN — ACETAMINOPHEN 1000 MG: 10 INJECTION, SOLUTION INTRAVENOUS at 14:30

## 2017-01-01 RX ADMIN — FLUOROURACIL 4990 MG: 50 INJECTION, SOLUTION INTRAVENOUS at 12:00

## 2017-01-01 RX ADMIN — SODIUM CHLORIDE, SODIUM LACTATE, POTASSIUM CHLORIDE, AND CALCIUM CHLORIDE 125 ML/HR: 600; 310; 30; 20 INJECTION, SOLUTION INTRAVENOUS at 16:17

## 2017-01-01 RX ADMIN — IOPAMIDOL 100 ML: 755 INJECTION, SOLUTION INTRAVENOUS at 07:57

## 2017-01-01 RX ADMIN — GLYCOPYRROLATE 0.5 MG: 0.2 INJECTION INTRAMUSCULAR; INTRAVENOUS at 15:13

## 2017-01-01 RX ADMIN — LEUCOVORIN CALCIUM 830 MG: 350 INJECTION, POWDER, LYOPHILIZED, FOR SOLUTION INTRAMUSCULAR; INTRAVENOUS at 09:40

## 2017-01-01 RX ADMIN — SODIUM CHLORIDE, SODIUM LACTATE, POTASSIUM CHLORIDE, AND CALCIUM CHLORIDE 25 ML/HR: 600; 310; 30; 20 INJECTION, SOLUTION INTRAVENOUS at 13:27

## 2017-01-01 RX ADMIN — FLUOROURACIL 4990 MG: 50 INJECTION, SOLUTION INTRAVENOUS at 12:30

## 2017-01-01 RX ADMIN — HYDROMORPHONE HYDROCHLORIDE 0.5 MG: 2 INJECTION, SOLUTION INTRAMUSCULAR; INTRAVENOUS; SUBCUTANEOUS at 10:33

## 2017-01-01 RX ADMIN — PIPERACILLIN SODIUM,TAZOBACTAM SODIUM 3.38 G: 3; .375 INJECTION, POWDER, FOR SOLUTION INTRAVENOUS at 11:37

## 2017-01-01 RX ADMIN — HYDROMORPHONE HYDROCHLORIDE 0.5 MG: 1 INJECTION, SOLUTION INTRAMUSCULAR; INTRAVENOUS; SUBCUTANEOUS at 20:06

## 2017-01-01 RX ADMIN — LEUCOVORIN CALCIUM 830 MG: 500 INJECTION, POWDER, LYOPHILIZED, FOR SOLUTION INTRAMUSCULAR; INTRAVENOUS at 10:00

## 2017-01-01 RX ADMIN — FENTANYL CITRATE 25 MCG: 50 INJECTION, SOLUTION INTRAMUSCULAR; INTRAVENOUS at 16:37

## 2017-01-01 RX ADMIN — FENTANYL CITRATE 25 MCG: 50 INJECTION, SOLUTION INTRAMUSCULAR; INTRAVENOUS at 11:38

## 2017-01-01 RX ADMIN — PROPOFOL 150 MG: 10 INJECTION, EMULSION INTRAVENOUS at 15:19

## 2017-01-01 RX ADMIN — CEFAZOLIN 2 G: 10 INJECTION, POWDER, FOR SOLUTION INTRAVENOUS; PARENTERAL at 12:43

## 2017-01-01 RX ADMIN — DEXTROSE MONOHYDRATE 25 ML/HR: 5 INJECTION, SOLUTION INTRAVENOUS at 08:13

## 2017-01-01 RX ADMIN — ACETAMINOPHEN 1000 MG: 10 INJECTION, SOLUTION INTRAVENOUS at 11:56

## 2017-01-01 RX ADMIN — SODIUM CHLORIDE 50 ML/HR: 900 INJECTION, SOLUTION INTRAVENOUS at 02:01

## 2017-01-01 RX ADMIN — ACETAMINOPHEN 1000 MG: 500 TABLET, FILM COATED ORAL at 17:05

## 2017-01-01 RX ADMIN — HYDROMORPHONE HYDROCHLORIDE 1 MG: 2 INJECTION, SOLUTION INTRAMUSCULAR; INTRAVENOUS; SUBCUTANEOUS at 15:40

## 2017-01-01 RX ADMIN — FENTANYL CITRATE 100 MCG: 50 INJECTION, SOLUTION INTRAMUSCULAR; INTRAVENOUS at 07:35

## 2017-01-01 RX ADMIN — IOPAMIDOL 100 ML: 755 INJECTION, SOLUTION INTRAVENOUS at 20:22

## 2017-01-01 RX ADMIN — ACETAMINOPHEN 1000 MG: 10 INJECTION, SOLUTION INTRAVENOUS at 17:33

## 2017-01-01 RX ADMIN — HYDROMORPHONE HYDROCHLORIDE 0.5 MG: 2 INJECTION, SOLUTION INTRAMUSCULAR; INTRAVENOUS; SUBCUTANEOUS at 08:46

## 2017-01-01 RX ADMIN — ACETAMINOPHEN 1000 MG: 10 INJECTION, SOLUTION INTRAVENOUS at 17:19

## 2017-01-01 RX ADMIN — Medication 10 ML: at 05:39

## 2017-01-01 RX ADMIN — ATORVASTATIN CALCIUM 40 MG: 40 TABLET, FILM COATED ORAL at 09:21

## 2017-01-11 ENCOUNTER — HOSPITAL ENCOUNTER (OUTPATIENT)
Dept: RADIATION THERAPY | Age: 67
Discharge: HOME | End: 2017-01-11

## 2017-03-14 NOTE — PROGRESS NOTES
To: Kaitlin Huertas MD  CC:  Charan Cotto MD      From: Moraima Mcgee MD    Thank you for sending Daivd Dragoon to see us. Please note that Dragon voice recognition software was used to transcribe this note. I apologize for any transcription errors. Encounter Date: 3/14/2017  History and Physical    Assessment:   Left pelvic mass. Prostate dedicate CT does not allow differentiation between solid versus cystic versus aneurysmal.    H/o left total hip replacement. 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 Body mass index is 30.56 kg/(m^2). Plan/Recommendations/Medical Decision Making:   Triple phase pelvic CT. Need wider view and arterial phase IV contrast that were not used on the prostate CT. If this is solid or cystic, will resect at time of prostatectomy. If aneurysmal (less likely) will involve Dr. Gregor Campos who has seen him in the past.      Discussed the risk of possible surgery including infection, bleeding, injury to adjacent structures, persistence of symptoms, and the risks of general anesthetic. All questions were answered to the patient's satisfaction. HPI:   Patient is a 78 y.o. male who is seen in consultation at the request of Dr. Hunter Machuca for left pelvic mass seen on prostate protocol CT done at [facility]. He has prostate cancer and is planning to undergo robotic prostatectomy by Dr. Hunter Machuca. The CT revealed a ~6cm mass/cyst adjacent to the left psoas. He has had left total hip replacement by Dr. Erick Curry in 2002. This was uncomplicated. He does have history of vascular disease. Has a complete occlusion of his right carotid, followed by Dr. Gregor Campos. Also had CABG x3 by Dr. Mady Wheatley in 2012. He does not more varicosities on his left leg then on his right.       Past Medical History:   Diagnosis Date    Adverse effect of anesthesia     hallucinations x2 after surgery when in ICU    Arthritis back    Burning with urination     CAD (coronary artery disease)     MI , CABG/Pt was told he did not have a heart attack by his cardiologist although yrs ago he told pt he had a heart attack    Cancer ([facility] [address]     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) - surgery    Cancer [facility])     Prostate Cancer    Carotid artery occlusion     reports 100% occlusion on one side and 55-70% blockage in the over side (this has not changed in at least 5-6 yrs)/has doppler study on neck 2 times a yr    Chronic obstructive pulmonary disease (HCC)     Chronic pain     r/t arthritis    Congestive heart failure ([facility] [address] [facility]     Hyperlipidemia     Hypertension     Ill-defined condition     S/P CABG x 3 12/6/2012      Past Surgical History:   Procedure Laterality Date    CABG, ARTERY-VEIN, THREE  12/6/2012    catherization    COLONOSCOPY N/A 8/19/2016    COLONOSCOPY performed by [facility]ksana Myrick MD at [facility] ENDOSCOPY    HX APPENDECTOMY      HX GI      colonoscopy 2012 - polyp    HX HEENT      tonsillectomy    HX HEENT  2015    Mouth Cancer surgery    HX ORTHOPAEDIC      L hip replacement     HX ORTHOPAEDIC      plantar warts removed    HX OTHER SURGICAL  12/3/15    bx right side of mouth/cancer removed with lymph nodes in neck removed     Social History   Substance Use Topics    Smoking status: Former Smoker     Packs/day: 1.50     Years: 50.00     Quit date: 12/6/2012    Smokeless tobacco: Never Used    Alcohol use 21.0 oz/week     35 Cans of beer per week      Comment: daily  stopped 11/2015/one case beer now (8/17/2016)      Family History   Problem Relation Age of Onset    Heart Disease Mother     Hypertension Mother     Diabetes Mother     Psychiatric Disorder Father     Asthma Daughter     Anesth Problems Neg Hx        Current Outpatient Prescriptions   Medication Sig    ASPIRIN PO Take 81 mg by mouth daily.  atorvastatin (LIPITOR) 20 mg tablet Take 20 mg by mouth daily.     MULTIVITAMIN PO Take by mouth. For men 48 +. Takes one po daily.  lisinopril (PRINIVIL, ZESTRIL) 10 mg tablet Take 10 mg by mouth daily.  metoprolol succinate (TOPROL-XL) 25 mg XL tablet Take 25 mg by mouth daily.  acetaminophen (TYLENOL EXTRA STRENGTH) 500 mg tablet Take 500 mg by mouth as needed for Pain. No current facility-administered medications for this visit. Allergies: Allergies   Allergen Reactions    Plavix [Clopidogrel] Rash        Review of Systems:  10 systems reviewed. See scanned sheet in \"Media\" section. See HPI for pertinent positives and negatives. Objective:     Visit Vitals    /82 (BP 1 Location: Right arm, BP Patient Position: Sitting)    Pulse 78    Temp 98.1 °F (36.7 °C) (Oral)    Resp 20    Ht 5' 10\" (1.778 m)    Wt 96.6 kg (213 lb)    SpO2 96%    BMI 30.56 kg/m2     General appearance  Alert, cooperative, no distress, appears stated age   HEENT  Anicteric   Neck Supple       Lungs   CTAB   Heart  Regular rate and rhythm. No murmur, rub or gallop   Abdomen   Soft. Non-tender. Bowel sounds normal. No masses, no organomegaly. Extremities No CCE. Spider varicosities upper and lower left leg. No increased edema. Pulses 2+ right radial   Skin Skin color, texture, turgor normal.        Neurologic Without overt sensory or motor deficit     Available imaging and labs reviewed.       Signed By: Moraima Mcgee MD     March 14, 2017

## 2017-03-14 NOTE — MR AVS SNAPSHOT
Visit Information Date & Time Provider Department Dept. Phone Encounter #  
 3/14/2017  2:20 PM Kait Ngo MD [facility] [id number] Upcoming Health Maintenance Date Due Hepatitis C Screening 1950 DTaP/Tdap/Td series (1 - Tdap) 10/30/1970 FOBT Q 1 YEAR AGE 50-75 10/30/1999 ZOSTER VACCINE AGE 60> 10/30/2009 GLAUCOMA SCREENING Q2Y 10/30/2014 MEDICARE YEARLY EXAM 10/30/2014 INFLUENZA AGE 9 TO ADULT 8/1/2016 Pneumococcal 65+ High/Highest Risk (2 of 2 - PPSV23) 12/11/2017 Allergies as of 3/14/2017  Review Complete On: 3/14/2017 By: Kait Ngo MD  
  
 Severity Noted Reaction Type Reactions Plavix [Clopidogrel]  10/19/2012    Rash Current Immunizations  Reviewed on 12/19/2015 Name Date Influenza Vaccine 10/1/2015 Influenza Vaccine Split 9/1/2012 Pneumococcal Vaccine (Unspecified Type) 12/11/2012  2:02 PM  
  
 Not reviewed this visit You Were Diagnosed With   
  
 Codes Comments Abdominal or pelvic swelling, mass, or lump, left lower quadrant    -  Primary ICD-10-CM: R19.04 
ICD-9-CM: 789.34 Vitals BP Pulse Temp Resp Height(growth percentile) Weight(growth percentile) 144/82 (BP 1 Location: Right arm, BP Patient Position: Sitting) 78 98.1 °F (36.7 °C) (Oral) 20 5' 10\" (1.778 m) 213 lb (96.6 kg) SpO2 BMI Smoking Status 96% 30.56 kg/m2 Former Smoker BMI and BSA Data Body Mass Index Body Surface Area 30.56 kg/m 2 2.18 m 2 Preferred Pharmacy Pharmacy Name Phone [facility] [phone] Your Updated Medication List  
  
   
This list is accurate as of: 3/14/17  4:20 PM.  Always use your most recent med list.  
  
  
  
  
 ASPIRIN PO Take 81 mg by mouth daily. atorvastatin 20 mg tablet Commonly known as:  LIPITOR Take 20 mg by mouth daily. lisinopril 10 mg tablet Commonly known as:  Yi Pk Take 10 mg by mouth daily. metoprolol succinate 25 mg XL tablet Commonly known as:  TOPROL-XL Take 25 mg by mouth daily. MULTIVITAMIN PO Take  by mouth. For men 48 +. Takes one po daily. TYLENOL EXTRA STRENGTH 500 mg tablet Generic drug:  acetaminophen Take 500 mg by mouth as needed for Pain. To-Do List   
 03/14/2017 Imaging:  CT PELV W CONT   
  
 03/15/2017 11:00 AM  
  Appointment with [facility] CT 1 at [facility] RAD CT ([phone]) CONTRAST STUDY: 1. The patient should not eat solid food four hours before the appointment but should be encouraged to drink clear liquids. 2. The patient will require IV access for contrast administration. 3. The patient should not take  Ibuprofen (Advil, Motrin, etc.) and Naproxen Sodium (Aleve, etc.)  on the day of the exam. Stopping non-steroidal anti-inflammatory agents (NSAIDs) like Ibuprofen decreases the risk of kidney damage from the x-ray contrast (dye). 4. Bring any non [facility] facility films/images pertaining to the area of interest with you on the day of appointment. 5. Bring current lab work if available(within last 90 days CMP) ***If scheduled at [facility], iSTAT is not available, labs will need to be done before appointment*** 6. Check in at registration at least 30 minutes before appt time unless you were instructed to do otherwise. 7. If you have to drink oral contrast please pick it up any weekday prior to your appointment, if you cannot please check in 2 hrs  before appt time. Introducing [facility]! Dear Lorrie Found: Thank you for requesting a [employer] account. Our records indicate that you have previously registered for a [employer] account but its currently inactive. Please call our [employer] support line at [phone]. Additional Information If you have questions, please visit the Frequently Asked Questions section of the LookIt website at [email or web address] Remember, LookIt is NOT to be used for urgent needs. For medical emergencies, dial 911. Now available from your iPhone and Android! Please provide this summary of care documentation to your next provider. Your primary care clinician is listed as Michelle Johansen. If you have any questions after today's visit, please call [phone].

## 2017-03-23 NOTE — PERIOP NOTES
[facility]  Preoperative Instructions        Surgery Date 4/5/2017          Time of Arrival 10:30 a.m.    1. On the day of your surgery, please report to the Surgical Services Registration Desk and sign in at your designated time. The Surgery Center is located to the right of the Emergency Room. 2. You must have someone with you to drive you home. You should not drive a car for 24 hours following surgery. Please make arrangements for a friend or family member to stay with you for the first 24 hours after your surgery. 3. Do not have anything to eat or drink (including water, gum, mints, coffee, juice) after midnight 4/4/2017. This may not apply to medications prescribed by your physician. Please note special instructions, if applicable. If you are currently taking Plavix, Coumadin, or other blood-thinning agents, contact your surgeon for instructions. 4. We recommend you do not drink any alcoholic beverages for 24 hours before and after your surgery. 5. Have a list of all current medications, including vitamins, herbal supplements and any other over the counter medications. Stop all Aspirin and non-steroidal anti-inflammatory drugs (I.e. Advil, Aleve), as directed by your surgeon's office. Stop all vitamins and herbal supplements seven days prior to your surgery. 6. Wear comfortable clothes. Wear glasses instead of contacts. Do not bring any money or jewelry. Please bring picture ID, insurance card, and any prearranged co-payment or hospital payment. Do not wear make-up, particularly mascara the morning of your surgery. Do not wear nail polish, particularly if you are having foot /hand surgery. Wear your hair loose or down, no ponytails, buns, masha pins or clips. All body piercings must be removed.   Please shower with antibacterial soap for three consecutive days before and on the morning of surgery, but do not apply any lotions, powders or deodorants after the shower on the day of surgery. Please use a fresh towels after each shower. Please sleep in clean clothes and change bed linens the night before surgery. Please do not shave for 48 hours prior to surgery. Shaving of the face is acceptable. 7. You should understand that if you do not follow these instructions your surgery may be cancelled. If your physical condition changes (I.e. fever, cold or flu) please contact your surgeon as soon as possible. 8. It is important that you be on time. If a situation occurs where you may be late, please call [phone] (OR Holding Area). 9. If you have any questions and or problems, please call [phone] (Pre-admission Testing). 10. Your surgery time may be subject to change. You will receive a phone call the evening prior if your time changes. 11.  If having outpatient surgery, you must have someone to drive you here, stay with you during the duration of your stay, and to drive you home at time of discharge. Special Instructions:    MEDICATIONS TO TAKE THE MORNING OF SURGERY WITH A SIP OF WATER: Tylenol if needed, metoprolol      I understand a pre-operative phone call will be made to verify my surgery time. In the event that I am not available, I give permission for a message to be left on my answering service and/or with another person?   yes    Contact # [id number]         ___________________      __________   _________    (Signature of Patient)             (Witness)                (Date and Time)

## 2017-03-23 NOTE — PERIOP NOTES
LM on VM for Dr. Chong Leong nurse to verify pre op aspirin instructions. Call back information given. 1320 - Received VM from Keara Smart with Dr. Renu Uribe. Patient instructed to stop aspirin 7 days prior to surgery, per Dr. Renu Uribe.

## 2017-03-29 NOTE — H&P
4/5/17  H&P  Victor Hugo Leach is a 78year old White male with ACP. Last seen 1/18. I thought we had decided to proceed with RASI. 2/7 CT demonstrates 1. No evidence of metastatic disease in the visualized pelvis. 2. Left psoas fluid density lesion measuring 5.7 x 5.1 x 5 cm. This may be a postsurgical fluid collection/evolving hematoma after hip replacement surgery. Comparison with preoperative examinations would be helpful. Consider consultation with orthopedic surgery as this finding could be related to arthroplasty complications. His left hip replacement was about 2002 and he had no problems at all. He has been anxious and unable to make a decision on treatment for his prostate cancer despite complete counseling. Prostate Cancer  Diagnosis Date:  11/28/2016  Prior Status:   Newly diagnosed disease  Pretreatment PSA:  4.7 ng/ml  Total (+) Cores:   3/12  Highest Core% Involved: 40%  Prostate volume: 27.97 ccs   Clinical Stage:   T1c  Comment:  12/6/16 Decipher score 0.71 which is genomic high risk with 38.9% likelihood of high-grade disease at prostatectomy, 17.6% 5 year metastatic risk, 12% 10 year prostate cancer specific mortality. 11/28/2016 TRUS BIOPSY RESULTS:  Right Lateral Base:  Benign  Right Medial Base:  Benign  Right Lateral Mid:  Benign  Right Medial Mid:  Benign  Right Lateral Barnard:  Benign  Right Medial Barnard:  Benign  Left Lateral Base:  Benign  Left Medial Base:  Benign  Left Lateral Mid:  Benign  Left Medial Mid:   3+4=7 (40%)  Left Lateral Barnard:  3+3=6 (15%)  Left Medial Barnard:  3+3=6 (10%)    Sexual Function  Erectile Status:  Firm for intercourse with no therapy    Incontinence  Continence Status: No leakage    IMAGING RESULTS:   CT Abd/Pelvis 02/07/2017 - Negative - Incidental left psoas cystic structure measuring 6 cm      PAST MEDICAL HISTORY:    Allergies: * PLAVIX (Mild)  DENIES: Latex, Shellfish, X-Ray Dye, Iodine.      Medications: ASPIRIN 81 MG ORAL TBEC (ASPIRIN) daily  ATORVASTATIN CALCIUM 40 MG ORAL TABS (ATORVASTATIN CALCIUM) daily  METOPROLOL SUCCINATE ER 25 MG ORAL XR24H-TAB (METOPROLOL SUCCINATE) daily  LISINOPRIL 5 MG ORAL TABS (LISINOPRIL) daily    Problems: Pelvic mass (ICD-789.30) (TEK24-K35.00)  PROSTATE ADENOCARCINOMA (ICD-185) (HCV19-P40)  Frequency of urination (ICD-788.41) (MYL66-P43.0)  Benign prostatic hyperplasia with lower urinary tract symptoms (ICD-600.01) (YKU70-P57.1)    Illnesses: Heart Disease, High Blood Pressure, and Cancer. DENIES: Pacemaker/Defibrillator, Lung Disease, Diabetes, Bowel Problems, Stroke/Seizure, Kidney Problems, Bleeding Problems, HIV, or Hepatitis. Surgeries: Joint Replacement Surgery, Open Heart Surgery, and Other Abdominal Surgery. Family History: DENIES: Prostate cancer, Kidney cancer, Kidney disease, Kidney stones. Social History: Retired. . Smoking status: former smoker. Drinks alcohol 4 or more times a week. System Review: Admits to: Dry Skin, Impaired Sex Drive, and Easy Bleeding. DENIES: Unexplained Weight Loss, Dry Eyes, Dry Mouth, Leg Swelling, Shortness of Breath, Constipation, Involuntary Urine Loss, Lower Extremity Weakness, Difficulty Walking, Psychiatric Problems, Rash. PSA HISTORY  4.7 ng/ml on 09/30/2016  3.4 ng/ml on 02/25/2015  4.41 ng/ml on 02/25/2014    IMPRESSION:    1. PROSTATE ADENOCARCINOMA (ICD-185) - Unchanged: Newly diagnosed disease. We had a complete discussion about his prostate cancer options and how this may relate to his pelvic mass. He is favoring prostatectomy. RALP with BPLND was thoroughly described. He is not concerned about potency and has no current partner. After complete discussion, he agrees to proceed with the surgery. We did get cardiac and carotid artery clearance from Dr. Bethany Ames. He is at increased risk due to these problems. 2. BENIGN PROSTATIC HYPERPLASIA WITH LOWER URINARY TRACT SYMPTOMS (ICD-600.01) - Unchanged    3.  PELVIC MASS (ICD-789.30) - New  he was seen and cleared by his orthopedist Dr. Sung Barger. he also saw Dr. Melissa Segal for general surgery opinion and we discussed this. The general consensus is to leave this mass alone. I do not feel it should be in the way during his robotic prostatectomy. cc: MD Mike Banda M.D. Tanisha Prakash M.D.       Sung Barger M.D.

## 2017-04-04 NOTE — TELEPHONE ENCOUNTER
Dr. Hunter Morrow spoke to Brooke Mike regarding results of Ct. No surgical option for Dr. Hunter Morrow. He needs to see his ortho Dr. Dr. Hunter Morrow also spoke to Dr. Francesca Egan. Ct  report has been faxed to Dr. Keyur Moulton. Spoke to Lita Reyes.

## 2017-04-05 PROBLEM — C61 PROSTATE CANCER (HCC): Status: ACTIVE | Noted: 2017-01-01

## 2017-04-05 NOTE — IP AVS SNAPSHOT
Current Discharge Medication List  
  
START taking these medications Dose & Instructions Dispensing Information Comments Morning Noon Evening Bedtime  
 cephALEXin 500 mg capsule Commonly known as:  Cori Becerra Your last dose was: Your next dose is:    
   
   
 Dose:  500 mg Take 1 Cap by mouth three (3) times daily. Quantity:  30 Cap Refills:  0  
     
   
   
   
  
 oxyCODONE-acetaminophen 5-325 mg per tablet Commonly known as:  PERCOCET Your last dose was: Your next dose is:    
   
   
 Dose:  1-2 Tab Take 1-2 Tabs by mouth every four (4) hours as needed. Max Daily Amount: 12 Tabs. Quantity:  20 Tab Refills:  0 CONTINUE these medications which have NOT CHANGED Dose & Instructions Dispensing Information Comments Morning Noon Evening Bedtime  
 atorvastatin 40 mg tablet Commonly known as:  LIPITOR Your last dose was: Your next dose is:    
   
   
 Dose:  40 mg Take 40 mg by mouth daily. Refills:  0  
     
   
   
   
  
 lisinopril 5 mg tablet Commonly known as:  Barbarann Plunk Your last dose was: Your next dose is:    
   
   
 Dose:  5 mg Take 5 mg by mouth daily. Refills:  0  
     
   
   
   
  
 metoprolol succinate 25 mg XL tablet Commonly known as:  TOPROL-XL Your last dose was: Your next dose is:    
   
   
 Dose:  25 mg Take 25 mg by mouth daily. Refills:  0 MULTIVITAMIN PO Your last dose was: Your next dose is: Take  by mouth. For men 48 +. Takes one po every other day. Refills:  0  
     
   
   
   
  
 TYLENOL EXTRA STRENGTH 500 mg tablet Generic drug:  acetaminophen Your last dose was: Your next dose is:    
   
   
 Dose:  500 mg Take 500 mg by mouth as needed for Pain. Refills:  0 STOP taking these medications  ASPIRIN PO  
   
 Where to Get Your Medications Information on where to get these meds will be given to you by the nurse or doctor. ! Ask your nurse or doctor about these medications  
  cephALEXin 500 mg capsule  
 oxyCODONE-acetaminophen 5-325 mg per tablet

## 2017-04-05 NOTE — ANESTHESIA POSTPROCEDURE EVALUATION
Post-Anesthesia Evaluation and Assessment    Patient: Denton Gage MRN: [id number]  SSN: [id number]    YOB: 1950  Age: 78 y.o. Sex: male       Cardiovascular Function/Vital Signs  Visit Vitals    /76 (BP 1 Location: Left arm, BP Patient Position: At rest)    Pulse 76    Temp 36.9 °C (98.4 °F)    Resp 15    Ht 5' 10\" (1.778 m)    Wt 92.9 kg (204 lb 12.9 oz)    SpO2 96%    BMI 29.39 kg/m2       Patient is status post general anesthesia for Procedure(s):  DAVINCI LAPAROSCOPIC PROSTATECTOMY WITH LEFT PLND. Nausea/Vomiting: None    Postoperative hydration reviewed and adequate. Pain:  Pain Scale 1: Numeric (0 - 10) (04/05/17 1600)  Pain Intensity 1: 5 (04/05/17 1600)   Managed    Neurological Status:   Neuro (WDL): Within Defined Limits (04/05/17 1139)   At baseline    Mental Status and Level of Consciousness: Arousable    Pulmonary Status:   O2 Device: Nasal cannula (04/05/17 1600)   Adequate oxygenation and airway patent    Complications related to anesthesia: None    Post-anesthesia assessment completed.  No concerns    Signed By: Amy Perez MD     April 5, 2017

## 2017-04-05 NOTE — ANESTHESIA PREPROCEDURE EVALUATION
Anesthetic History   No history of anesthetic complications            Review of Systems / Medical History  Patient summary reviewed, nursing notes reviewed and pertinent labs reviewed    Pulmonary  Within defined limits                 Neuro/Psych   Within defined limits           Cardiovascular    Hypertension: well controlled          Past MI, CAD and CABG    Exercise tolerance: >4 METS  Comments: Walks >2 miles/D w/o diff.    GI/Hepatic/Renal  Within defined limits              Endo/Other        Arthritis     Other Findings   Comments: Prostate ca           Physical Exam    Airway  Mallampati: II  TM Distance: > 6 cm  Neck ROM: normal range of motion   Mouth opening: Normal     Cardiovascular  Regular rate and rhythm,  S1 and S2 normal,  no murmur, click, rub, or gallop             Dental    Dentition: Full upper dentures and Lower partial plate     Pulmonary  Breath sounds clear to auscultation               Abdominal  GI exam deferred       Other Findings            Anesthetic Plan    ASA: 3  Anesthesia type: general          Induction: Intravenous  Anesthetic plan and risks discussed with: Patient

## 2017-04-05 NOTE — PERIOP NOTES
Pt reports he used 2 bottles of magnesium citrate yesterday and today he took 1 fleets enema  Results liquid stools.    Pt cousin has his id

## 2017-04-05 NOTE — PROGRESS NOTES
Pharmacy Automatic Renal Dosing Protocol - Antimicrobials      Indication for Antimicrobials: s/p prostatectomy  Current Regimen of Each Antimicrobial (Start Day & Day of Therapy):  Cephalexin 500 mg po q 8 hr -4/5-day #1    Significant cultures: none      CAPD, Intermittent Hemodialysis or Renal Replacement Therapy: no  Paralysis, amputations, malnutrition: no    No results for input(s): CREA, BUN, WBC in the last 72 hours. Temp (24hrs), Av.1 °F (36.7 °C), Min:97.8 °F (36.6 °C), Max:98.4 °F (36.9 °C)    Creatinine Clearance (ml/min): 68.5            Impression/Plan: will continue current dose        Pharmacy will follow daily and adjust doses of monitored medications as appropriate for renal function and/or serum levels.     Thank you,  Fadi President, PHARMD

## 2017-04-05 NOTE — PERIOP NOTES
Handoff Report from Operating Room to PACU    Report received from [id number], RN and LIAT Richard regarding Lee Deleon. Surgeon(s):  Lynn Koehler MD  And Procedure(s) (LRB):  DAVINCI LAPAROSCOPIC PROSTATECTOMY WITH LEFT PLND (N/A)  confirmed   with allergies, drains and dressings discussed. Anesthesia type, drugs, patient history, complications, estimated blood loss, vital signs, intake and output, and last pain medication, lines, reversal medications and temperature were reviewed.

## 2017-04-05 NOTE — IP AVS SNAPSHOT
[address] [facility] 
[phone] Patient: Kacy Alexander MRN: [id number] [facility]:[id number]/[id number] You are allergic to the following Allergen Reactions Plavix (Clopidogrel) Rash Recent Documentation Height Weight BMI Smoking Status 1.778 m 92.9 kg 29.39 kg/m2 Former Smoker Emergency Contacts Name Discharge Info Relation Home Work Mobile Miryam Brito DISCHARGE CAREGIVER [3] Other Relative [6] [phone] Asia Martinez DISCHARGE CAREGIVER [3] Daughter [21] [phone] [facility] DISCHARGE CAREGIVER [3] Daughter [21] [phone] About your hospitalization You were admitted on:  April 5, 2017 You last received care in the:  [facility] 2 GENERAL SURGERY You were discharged on:  April 6, 2017 Unit phone number:  [phone] Why you were hospitalized Your primary diagnosis was:  Not on File Your diagnoses also included:  Prostate Cancer (Hcc) Providers Seen During Your Hospitalizations Provider Role Specialty Primary office phone Paradise Guerrero MD Attending Provider Urology [phone] Your Primary Care Physician (PCP) Primary Care Physician Office Phone Office Fax Gume Cantrell [phone] Follow-up Information Follow up With Details Comments Contact Info Clif Urrutia MD Schedule an appointment as soon as possible for a visit to see him in 7-10 days [address] [place] [address] 
[phone] Paradise Guerrero MD On 4/12/2017 at 8:30am [address] [facility] 
[phone] Current Discharge Medication List  
  
START taking these medications Dose & Instructions Dispensing Information Comments Morning Noon Evening Bedtime  
 cephALEXin 500 mg capsule Commonly known as:  Maryjane Ladonna Your last dose was: Your next dose is:    
   
   
 Dose:  500 mg Take 1 Cap by mouth three (3) times daily. Quantity:  30 Cap Refills:  0  
     
   
   
   
  
 oxyCODONE-acetaminophen 5-325 mg per tablet Commonly known as:  PERCOCET Your last dose was: Your next dose is:    
   
   
 Dose:  1-2 Tab Take 1-2 Tabs by mouth every four (4) hours as needed. Max Daily Amount: 12 Tabs. Quantity:  20 Tab Refills:  0 CONTINUE these medications which have NOT CHANGED Dose & Instructions Dispensing Information Comments Morning Noon Evening Bedtime  
 atorvastatin 40 mg tablet Commonly known as:  LIPITOR Your last dose was: Your next dose is:    
   
   
 Dose:  40 mg Take 40 mg by mouth daily. Refills:  0  
     
   
   
   
  
 lisinopril 5 mg tablet Commonly known as:  Andrew Lofty Your last dose was: Your next dose is:    
   
   
 Dose:  5 mg Take 5 mg by mouth daily. Refills:  0  
     
   
   
   
  
 metoprolol succinate 25 mg XL tablet Commonly known as:  TOPROL-XL Your last dose was: Your next dose is:    
   
   
 Dose:  25 mg Take 25 mg by mouth daily. Refills:  0 MULTIVITAMIN PO Your last dose was: Your next dose is: Take  by mouth. For men 48 +. Takes one po every other day. Refills:  0  
     
   
   
   
  
 TYLENOL EXTRA STRENGTH 500 mg tablet Generic drug:  acetaminophen Your last dose was: Your next dose is:    
   
   
 Dose:  500 mg Take 500 mg by mouth as needed for Pain. Refills:  0 STOP taking these medications ASPIRIN PO Where to Get Your Medications Information on where to get these meds will be given to you by the nurse or doctor. ! Ask your nurse or doctor about these medications  
  cephALEXin 500 mg capsule  
 oxyCODONE-acetaminophen 5-325 mg per tablet Discharge Instructions Laparoscopic Radical Prostatectomy: What to Expect at [facility] Your Recovery A laparoscopic radical prostatectomy is surgery to remove the prostate gland. It is done to treat prostate cancer that has not spread beyond the prostate. The doctor made several small cuts, called incisions, in your lower belly. The doctor put a lighted tube (scope) and other surgical tools through the incisions to do the surgery. Or if you had robotic surgery, the doctor guided the robot arms to do this surgery. You may see some bruising and swelling right after your surgery. In the week after surgery, your penis and scrotum may swell. This usually gets better after 1 to 2 weeks. The incisions may be sore for 1 to 2 weeks. Your doctor will give you medicine for pain. You will have a tube (urinary catheter) to drain urine from your bladder for 1 to 2 weeks after surgery. You may have bladder cramps, or spasms, while the catheter is in your bladder. Your doctor can give you medicine to help prevent bladder spasms. After your catheter is removed, it may take several weeks or more for you to control your urine. And it may take 6 months or more for you to be able to have erections again. But with time, most men regain urine control and much of their previous sexual function. If not, medicines or other treatments may help. You will probably be able to go back to work or your usual activities 3 to 5 weeks after surgery. But it can take longer to fully recover. You will need to see your doctor regularly. This includes having blood tests to measure your PSA level. PSA is a substance that can suggest whether your cancer has returned. PSA tests are usually done more often for the first several years after your surgery, but less often after that. Having cancer is scary. You may feel many emotions and need some help coping.  It may help to talk with your family, friends, or a therapist about your feelings. You also can do things at home to make yourself feel better while you go through treatment. Call the [address] (3-808.431.7865) or visit its Web site at [address] for more information. This care sheet gives you a general idea about how long it will take for you to recover. But each person recovers at a different pace. Follow the steps below to get better as quickly as possible. How can you care for yourself at home? Activity · Rest when you feel tired. Getting enough sleep will help you recover. · Try to walk each day. Start by walking a little more than you did the day before. Bit by bit, increase the amount you walk. Walking boosts blood flow and helps prevent pneumonia and constipation. At first, avoid hills, and try to stay on flat ground. You can climb stairs, but try to limit how often you do this. When you do climb them, do it slowly, and pause every few steps. · Avoid strenuous activities for 2 weeks, or until your doctor says it is okay. This includes bicycle riding, jogging, weight lifting, or aerobic exercise. · For 2 to 3 weeks or until your doctor says it is okay, avoid lifting anything that would make you strain. This may include a child, heavy grocery bags and milk containers, a heavy briefcase or backpack, cat litter or dog food bags, or a vacuum . · You may shower if your doctor says it is okay. Empty the catheter bag before you start. When you shower, keep the catheter taped to your leg. Do not take a bath until your doctor or nurse has removed the catheter. · Ask your doctor when you can drive again. · Avoid riding in a car for more than 1 hour at a time for the first 3 weeks after surgery. If you must ride in a car for a longer distance, stop often to walk and stretch your legs. · You will probably need to take 3 to 5 weeks off from work. It depends on the type of work you do and how you feel. Diet · You can eat your normal diet. If your stomach is upset, try bland, low-fat foods like plain rice, broiled chicken, toast, and yogurt. · Drink plenty of fluids (unless your doctor tells you not to). · You may notice that your bowel movements are not regular right after your surgery. This is common. Try to avoid constipation and straining with bowel movements. You may want to take a fiber supplement every day. If you have not had a bowel movement after a couple of days, ask your doctor about taking a mild laxative. Medicines · Your doctor will tell you if and when you can restart your medicines. He or she will also give you instructions about taking any new medicines. · If you take blood thinners, such as warfarin (Coumadin), clopidogrel (Plavix), or aspirin, be sure to talk to your doctor. He or she will tell you if and when to start taking those medicines again. Make sure that you understand exactly what your doctor wants you to do. · Be safe with medicines. Take pain medicines exactly as directed. ¨ If the doctor gave you a prescription medicine for pain, take it as prescribed. ¨ If you are not taking a prescription pain medicine, ask your doctor if you can take an over-the-counter medicine. · If you think your pain medicine is making you sick to your stomach: 
¨ Take your medicine after meals (unless your doctor has told you not to). ¨ Ask your doctor for a different pain medicine. · Your doctor may prescribe antibiotics when your urinary catheter is removed. Take them as directed. Do not stop taking them just because you feel better. You need to take the full course of antibiotics. Incision care · If you have strips of tape on the incisions, leave the tape on for a week or until it falls off. · If you had stitches, your doctor will tell you when to come back to have them removed. · Wash the area daily with warm water and pat it dry.  Don't use hydrogen peroxide or alcohol, which can slow healing. You may cover the area with a gauze bandage if it weeps or rubs against clothing. Change the bandage every day. · Keep the area clean and dry. Ice · To help with pain and swelling, put ice or a cold pack on your groin for 10 to 20 minutes at a time. Put a thin cloth between the ice and your skin. Other instructions · You will have a urinary catheter for 1 to 2 weeks. Your doctor or nurse will tell you how to care for it. · Be sure the catheter is securely taped to your thigh and attached to the large drainage bag when you are at home. Use the smaller leg bag only when you go out. · A little urine leakage around the catheter is normal. You can place an incontinence pad in your underwear to absorb urine leaks. · Do not have an enema or use a rectal thermometer for 3 months, or until your doctor says it is okay. Follow-up care is a key part of your treatment and safety. Be sure to make and go to all appointments, and call your doctor if you are having problems. It's also a good idea to know your test results and keep a list of the medicines you take. When should you call for help? Call 911 anytime you think you may need emergency care. For example, call if: 
· You passed out (lost consciousness). · You have severe trouble breathing. · You have sudden chest pain and shortness of breath, or you cough up blood. Call your doctor now or seek immediate medical care if: 
· Your catheter comes out. · You have increasing bleeding or blood clots in your urine. · You have pain that spreads from your back to your side. · Your catheter stops draining urine. · The urine in your catheter bag is cloudy or smells bad. · You have signs of a blood clot, such as: 
¨ Pain in your calf, back of the knee, thigh, or groin. ¨ Redness and swelling in your leg or groin. · You have pain that does not get better after you take your pain medicine. · You have loose stitches, or any of your incisions come open. · Bright red blood has soaked through the bandages over your incisions. · You have signs of infection, such as: 
¨ Increased pain, swelling, warmth, or redness. ¨ Red streaks leading from the incision. ¨ Pus draining from the incision. ¨ Swollen lymph nodes in your neck, armpits, or groin. ¨ A fever. · You are sick to your stomach or cannot keep fluids down. Watch closely for any changes in your health, and be sure to contact your doctor if: 
· You have trouble urinating after the doctor or nurse takes out your catheter. · Your urine is red or brown. · Urine leaks around the catheter all the time. · You do not have a bowel movement after taking a laxative. Where can you learn more? Go to http://jose manuel-nettie.info/. Enter D427 in the search box to learn more about \"Laparoscopic Radical Prostatectomy: What to Expect at Home. \" Current as of: September 14, 2016 Content Version: 11.2 © 1969-9233 [employer]. Care instructions adapted under license by [employer] (which disclaims liability or warranty for this information). If you have questions about a medical condition or this instruction, always ask your healthcare professional. [address] any warranty or liability for your use of this information. Discharge Orders None [employer] Announcement We are excited to announce that we are making your provider's discharge notes available to you in [employer]. You will see these notes when they are completed and signed by the physician that discharged you from your recent hospital stay. If you have any questions or concerns about any information you see in [employer], please call the Health Information Department where you were seen or reach out to your Primary Care Provider for more information about your plan of care. Introducing [facility]! Dear Jackson Antony: Thank you for requesting a [employer] account. Our records indicate that you have previously registered for a [email or web address] account but its currently inactive. Please call our [employer] support line at [phone]. Additional Information If you have questions, please visit the Frequently Asked Questions section of the [employer] website at [email or web address] Remember, [email or web address] is NOT to be used for urgent needs. For medical emergencies, dial 911. Now available from your iPhone and Android! General Information Please provide this summary of care documentation to your next provider. Patient Signature:  ____________________________________________________________ Date:  ____________________________________________________________  
  
Saima Steve Provider Signature:  ____________________________________________________________ Date:  ____________________________________________________________

## 2017-04-05 NOTE — PROGRESS NOTES
End of Shift Nursing Note    Bedside shift change report given to LEATHA Caba (oncoming nurse) by Sofi Andres RN (offgoing nurse). Report included the following information SBAR, Kardex, Procedure Summary, Intake/Output and MAR. Zone Phone:   [phone]    Significant changes during shift:    none   Non-emergent issues for physician to address:   none     Number times ambulated in hallway past shift: 0      Number of times OOB to chair past shift: 0    POD #: 4-5-17     Vital Signs:    Temp: 99.6 °F (37.6 °C)     Pulse (Heart Rate): 71     BP: 133/84     Resp Rate: 18     O2 Sat (%): 95 %    Lines & Drains:     Urinary Catheter? Yes   Placement Date: 4-5-17   Medical Necessity: Surgical procedure      NG tube [] in [] removed [x] not applicable   Drains [x] in [] removed [] not applicable     Skin Integrity:      Wounds: yes   Dressings Present: yes    Wound Concerns: no      GI:    Current diet:  DIET CLEAR LIQUID    Nausea: NO  Vomiting: NO  Bowel Sounds: YES  Flatus: NO  Last Bowel Movement: several days ago     Respiratory:  Supplemental O2: Yes      Device: nasal cannula   via 1.5 Liters/min     Incentive Spirometer: YES  Volume: 2000  Coughing and Deep Breathing: YES  Oral Care: YES  Understanding (patient/family education): YES   Getting out of bed: NO  Head of bed elevation: YES    Patient Safety:    Falls Score: 1  Mobility Score: 1  Bed Alarm On? No  Sitter? No      Opportunity for questions and clarification was given to oncoming nurse. Patient bed is in supine position with HOB elevated, side rails are up x 2,call bell within reach and patient not in distress.     Ruthann Lucero

## 2017-04-05 NOTE — PERIOP NOTES
TRANSFER - OUT REPORT:    Verbal report given to Samir Nova on Olga Ascencio  being transferred to [employer], [id number] for routine post - op       Report consisted of patients Situation, Background, Assessment and   Recommendations(SBAR). Information from the following report(s) SBAR, Kardex, OR Summary, Intake/Output, MAR and Cardiac Rhythm NSR was reviewed with the receiving nurse. Lines:   Peripheral IV 04/05/17 Right Wrist (Active)   Site Assessment Clean, dry, & intact 4/5/2017  4:45 PM   Phlebitis Assessment 0 4/5/2017  4:45 PM   Infiltration Assessment 0 4/5/2017  4:45 PM   Dressing Status Clean, dry, & intact 4/5/2017  4:45 PM   Dressing Type Tape;Transparent 4/5/2017  4:45 PM   Hub Color/Line Status Green; Infusing 4/5/2017  4:45 PM        Opportunity for questions and clarification was provided.       Patient transported with:   O2 @ 2 liters  Tech     Patient's family updated on patient status and room assignment at [phone]

## 2017-04-05 NOTE — BRIEF OP NOTE
BRIEF OPERATIVE NOTE    Date of Procedure: 4/5/2017   Preoperative Diagnosis: PROSTATE CA, ED  Postoperative Diagnosis: SAME    Procedure(s):  DAVINCI ASSISTED LAPAROSCOPIC RADICAL PROSTATECTOMY WITH LEFT PLND  Surgeon(s) and Role:     * Vineet Berman MD - Primary       Surgical Staff:  Circ-1: Ana María Hackett  Circ-Relief: Schuyler Jay RN  Scrub Tech-1: Adis Savant  Surg Asst-1: [address] Staff: Lily Matute  Event Time In   Incision Start 1309   Incision Close      Anesthesia: General   Estimated Blood Loss: 25 ML  Specimens:   ID Type Source Tests Collected by Time Destination   1 : Right seminal vescicle Preservative Prostate  Vineet Berman MD 4/5/2017 1416 Pathology   2 : Left oburator pelvic nodes Preservative Pelvis  Vineet Berman MD 4/5/2017 1428 Pathology   3 : Prostate Preservative Pelvis  Vineet Berman MD 4/5/2017 1429 Pathology      Findings:  WNL   Complications: NONE  Implants: 20 Fr Ortega, 19 fr drain

## 2017-04-05 NOTE — OP NOTES
[address], [facility]   OP NOTE       Name:  Nery Crain   MR#:  [id number]   :  1950   Account #:  [de-identified]    Surgery Date:  2017   Date of Adm:  2017       PREOPERATIVE DIAGNOSES:   1. Prostate cancer. 2. Erectile dysfunction. POSTOPERATIVE DIAGNOSES:   1. Prostate cancer. 2. Erectile dysfunction. PROCEDURES PERFORMED: Azalee Lionel assisted laparoscopic radical   prostatectomy with left pelvic lymph node dissection. SURGEON: MD Dewey Pearson     ANESTHESIA: General.    SPECIMENS REMOVED: Right seminal vesicle prostate, left pelvic   lymph nodes. COMPLICATIONS: None. ESTIMATED BLOOD LOSS: 25 mL. INDICATIONS: A 25-year-old white male with clinical stage T1c,   Tracy 3 + 4 = 7 adenocarcinoma of the prostate on the left, PSA of   4.7, Decipher score 0.71, meaning 30% likelihood of high-grade   disease at prostatectomy. CT demonstrated a left psoas fluid density   lesion thought to be possibly secondary to his left hip replacement with   no further evaluation recommended after consultation. He has been   fully counseled and prepared per protocol, including a bowel prep, IV   Ancef and subcutaneous heparin. DESCRIPTION OF PROCEDURE: He underwent general   endotracheal anesthetic and had an orogastric tube placed by the   anesthesia department. He was placed in the low lithotomy position   with his arms padded and secured by his side. The table was tilted   confirming his stability. His hair was clipped. He was then prepped and   draped in a sterile fashion. Time-out was called confirming the planned   procedure. An 18-Irish Ortega catheter was inserted without difficulty. Marcaine 0.5% was used at all incision points. The anterior abdominal wall was elevated with towel clips and a 15   blade scalpel was used to make a supraumbilical incision.  The Veress   needle was passed and the drop test was normal. The opening   pressures were normal. CO2 pneumoperitoneum was created at 15   atmospheres and the robotic port was placed. The 0-degree lens was   used initially. The remainder of the robotic and assistant ports were   placed in their usual positions under direct vision and the robot was   docked. The left sigmoid adhesions to the left pelvic sidewall were   taken down sharply and the posterior approach was chosen. The   paddle was not necessary. The peritoneum overlying the seminal   vesicles and vasa was incised and these were dissected out without   difficulty. Denonvilliers fascia was incised, and the plane between the   prostate and rectum was created and extended distally. Attention was then turned to the anterior abdominal wall where the   bladder was dropped in the usual fashion. The endopelvic fascia on   both sides was cleared off, incised to and through the puboprostatic   ligaments. The DVC was dissected out. I then stapled and divided with   the 45 mm articulating BILLY Tri-Staple device after ensuring that the   Ortega catheter was free. I switched from 0 to 30-degree lens and made   the anterior cystotomy down to the Ortega, took down the lateral bladder   attachments with the vessel sealer device and completed the bladder   neck incision circumferentially. The posterior bladder wall was   developed. Longitudinal fibers were identified and transected, dropping   us into the posterior space. Lateral bladder attachments and the   pedicles were taken down with the vessel sealer, switching from the   30-degree back down to the 0-degree lens. I used the vessel sealer to   go wide on the left, which was his positive side, while dropping the   rectum. The right side was also taken, although not as wide. No   attempts at nerve-sparing were made by predetermined discussion   and decision.  The rectum was somewhat stuck distally and had to be   sharply taken off of the prostate, but this went uneventfully. The   rectourethralis was incised and the apex of the prostate completely   dissected out. The urethra was transected. The Ortega catheter was   identified and this incision was extended circumferentially. The   prostate was put into a drawstring bag for later retrieval. The   pneumoperitoneum was turned down to 5 atmospheres and the pelvis   was irrigated. Hemostasis was thought to be good. There was no   evidence of any rectal injury. Attention was then turned to the left   hemipelvis where an extended obturator area pelvic lymph node   dissection was done with the usual landmarks and no evidence of any   injury to the neurovascular structures. These were sent for permanent   examination. The right seminal vesicle tip had been torn off and sent separate from   the prostate for permanent pathologic examination. The   pneumoperitoneum was turned back up to 15 atmospheres and the   vesicourethral reanastomosis was done in the usual fashion with a 6-  inch bicolored double-armed 3-0 V-Loc suture, one transition stitch,   no Lapra-Ty's and no tension. At the end, a 20-Syriac Ortega catheter   was put through and the balloon inflated with 10 mL of sterile water. The bladder was filled with 120 mL of irrigant and confirmed. The   anastomosis was confirmed to be watertight. Fourth arm was then   removed and a 19-Syriac Bard drain was placed through it into the   prevesical space under direct vision. The robot was then undocked, and the camera was switched to the   assistant port so as to bring out the drawstring bag through the   supraumbilical incision. This incision was re-anesthetized and   extended, and the specimen extracted. The fascia at this incision was   then closed with 3 figure-of-eight sutures of #1 PDS. The drain was   secured with a 3-0 nylon.  The remainder of the incisions were closed   with subcuticular 4-0 Monocryl and Dermabond with a drain dressing   placed around the drain. This completed the procedure, which he   tolerated well. FLUIDS GIVEN: 2000 mL of crystalloid. No blood or blood products. COUNTS: Needle, sponge, and instrument counts were correct. CONDITION: He was stable on my departure from the operative suite. ESTIMATED OPERATIVE TIME: Skin to skin was 2 hours and 15   minutes.         MD FATOU Oliveira / MAXIME   D:  04/05/2017   15:19   T:  04/05/2017   16:04   Job #:  [id number]

## 2017-04-06 NOTE — DISCHARGE SUMMARY
Discharge Summary     Patient: Parker Stahl MRN: [id number]  SSN: [id number]    YOB: 1950  Age: 78 y.o. Sex: male      Admit Date: 4/5/2017    Discharge Date: 4/6/2017     * Admission Diagnoses:  PROSTATE CA  Prostate cancer [facility])     * Discharge Diagnoses:   Hospital Problems as of 4/6/2017  Date Reviewed: 4/5/2017          Codes Class Noted - Resolved POA    Prostate cancer [facility]) ICD-10-CM: C61  ICD-9-CM: 185  4/5/2017 - Present Unknown               * Procedures for this admission:   Procedure(s):  DAVINCI LAPAROSCOPIC PROSTATECTOMY WITH LEFT PLND      * Disposition: Home    * Discharged Condition: good    * Hospital Course:  uneventful    Discharge Medications:   Current Discharge Medication List      START taking these medications    Details   cephALEXin (KEFLEX) 500 mg capsule Take 1 Cap by mouth three (3) times daily. Qty: 30 Cap, Refills: 0      oxyCODONE-acetaminophen (PERCOCET) 5-325 mg per tablet Take 1-2 Tabs by mouth every four (4) hours as needed. Max Daily Amount: 12 Tabs. Qty: 20 Tab, Refills: 0         CONTINUE these medications which have NOT CHANGED    Details   atorvastatin (LIPITOR) 40 mg tablet Take 40 mg by mouth daily. lisinopril (PRINIVIL, ZESTRIL) 5 mg tablet Take 5 mg by mouth daily. metoprolol succinate (TOPROL-XL) 25 mg XL tablet Take 25 mg by mouth daily. MULTIVITAMIN PO Take  by mouth. For men 48 +. Takes one po every other day. acetaminophen (TYLENOL EXTRA STRENGTH) 500 mg tablet Take 500 mg by mouth as needed for Pain. STOP taking these medications       ASPIRIN PO Comments:   Reason for Stopping:                * Follow-up Care/Patient Instructions:   Activity: No lifting, Driving, or Strenuous exercise for 2 weeks  Diet: Resume previous diet  Wound Care: Keep wound clean and dry    Follow-up Information     Follow up With Details [facility], [address] [place] [address]  [address] [address] [phone] Signed By: Beronica Moya MD     April 6, 2017

## 2017-04-06 NOTE — PROGRESS NOTES
Urology Progress Note    Subjective:     Daily Progress Note: 2017 8:11 AM    Shelly Vora is 1 Day Post-Op and doing excellent. He reports pain is well controlled. He has no complaints. He is tolerating clear liquids and ambulating without assistance. Indwelling catheter is draining well. Objective:     Visit Vitals    /68 (BP 1 Location: Right arm, BP Patient Position: At rest)    Pulse 72    Temp 98.4 °F (36.9 °C)    Resp 18    Ht 5' 10\" (1.778 m)    Wt 92.9 kg (204 lb 12.9 oz)    SpO2 92%    BMI 29.39 kg/m2        Temp (24hrs), Av.4 °F (36.9 °C), Min:97.8 °F (36.6 °C), Max:99.6 °F (37.6 °C)      Intake and Output:   1901 -  0700  In: 2787 [P.O.:275; I.V.:2200]  Out: 4235 [Urine:1550; Drains:170]   0701 -  1900  In: -   Out: 200 [Urine:1200; Drains:40]    Physical Exam:   General appearance: alert, cooperative, no distress  Abdomen: normal findings: post op  Male genital:  normal  Extremities: Normal    Incision: clean, dry and min drainage    Lab/Data Review: All lab results for the last 24 hours reviewed. Assessment/Plan:     Active Problems:    Prostate cancer ([address] (2017)        Status Post:  Procedure(s):  DAVINCI LAPAROSCOPIC PROSTATECTOMY WITH LEFT PLND     Plan:  Doing well and continue with treatment. Full liquids. Increase ambulation. JANNETTE IVF, SCD.   DC DAVEY and discharge home this PM    Signed By: Raisa Ferrlel MD                         2017

## 2017-04-06 NOTE — DISCHARGE INSTRUCTIONS
Laparoscopic Radical Prostatectomy: What to Expect at Home  Your Recovery  A laparoscopic radical prostatectomy is surgery to remove the prostate gland. It is done to treat prostate cancer that has not spread beyond the prostate. The doctor made several small cuts, called incisions, in your lower belly. The doctor put a lighted tube (scope) and other surgical tools through the incisions to do the surgery. Or if you had robotic surgery, the doctor guided the robot arms to do this surgery. You may see some bruising and swelling right after your surgery. In the week after surgery, your penis and scrotum may swell. This usually gets better after 1 to 2 weeks. The incisions may be sore for 1 to 2 weeks. Your doctor will give you medicine for pain. You will have a tube (urinary catheter) to drain urine from your bladder for 1 to 2 weeks after surgery. You may have bladder cramps, or spasms, while the catheter is in your bladder. Your doctor can give you medicine to help prevent bladder spasms. After your catheter is removed, it may take several weeks or more for you to control your urine. And it may take 6 months or more for you to be able to have erections again. But with time, most men regain urine control and much of their previous sexual function. If not, medicines or other treatments may help. You will probably be able to go back to work or your usual activities 3 to 5 weeks after surgery. But it can take longer to fully recover. You will need to see your doctor regularly. This includes having blood tests to measure your PSA level. PSA is a substance that can suggest whether your cancer has returned. PSA tests are usually done more often for the first several years after your surgery, but less often after that. Having cancer is scary. You may feel many emotions and need some help coping. It may help to talk with your family, friends, or a therapist about your feelings.  You also can do things at home to make yourself feel better while you go through treatment. Call the [facility] ([phone]) or visit its Web site at [address] [facility] [employer] for more information. This care sheet gives you a general idea about how long it will take for you to recover. But each person recovers at a different pace. Follow the steps below to get better as quickly as possible. How can you care for yourself at home? Activity  · Rest when you feel tired. Getting enough sleep will help you recover. · Try to walk each day. Start by walking a little more than you did the day before. Bit by bit, increase the amount you walk. Walking boosts blood flow and helps prevent pneumonia and constipation. At first, avoid hills, and try to stay on flat ground. You can climb stairs, but try to limit how often you do this. When you do climb them, do it slowly, and pause every few steps. · Avoid strenuous activities for 2 weeks, or until your doctor says it is okay. This includes bicycle riding, jogging, weight lifting, or aerobic exercise. · For 2 to 3 weeks or until your doctor says it is okay, avoid lifting anything that would make you strain. This may include a child, heavy grocery bags and milk containers, a heavy briefcase or backpack, cat litter or dog food bags, or a vacuum . · You may shower if your doctor says it is okay. Empty the catheter bag before you start. When you shower, keep the catheter taped to your leg. Do not take a bath until your doctor or nurse has removed the catheter. · Ask your doctor when you can drive again. · Avoid riding in a car for more than 1 hour at a time for the first 3 weeks after surgery. If you must ride in a car for a longer distance, stop often to walk and stretch your legs. · You will probably need to take 3 to 5 weeks off from work. It depends on the type of work you do and how you feel. Diet  · You can eat your normal diet.  If your stomach is upset, try bland, low-fat foods like plain rice, broiled chicken, toast, and yogurt. · Drink plenty of fluids (unless your doctor tells you not to). · You may notice that your bowel movements are not regular right after your surgery. This is common. Try to avoid constipation and straining with bowel movements. You may want to take a fiber supplement every day. If you have not had a bowel movement after a couple of days, ask your doctor about taking a mild laxative. Medicines  · Your doctor will tell you if and when you can restart your medicines. He or she will also give you instructions about taking any new medicines. · If you take blood thinners, such as warfarin (Coumadin), clopidogrel (Plavix), or aspirin, be sure to talk to your doctor. He or she will tell you if and when to start taking those medicines again. Make sure that you understand exactly what your doctor wants you to do. · Be safe with medicines. Take pain medicines exactly as directed. ¨ If the doctor gave you a prescription medicine for pain, take it as prescribed. ¨ If you are not taking a prescription pain medicine, ask your doctor if you can take an over-the-counter medicine. · If you think your pain medicine is making you sick to your stomach:  ¨ Take your medicine after meals (unless your doctor has told you not to). ¨ Ask your doctor for a different pain medicine. · Your doctor may prescribe antibiotics when your urinary catheter is removed. Take them as directed. Do not stop taking them just because you feel better. You need to take the full course of antibiotics. Incision care  · If you have strips of tape on the incisions, leave the tape on for a week or until it falls off. · If you had stitches, your doctor will tell you when to come back to have them removed. · Wash the area daily with warm water and pat it dry. Don't use hydrogen peroxide or alcohol, which can slow healing. You may cover the area with a gauze bandage if it weeps or rubs against clothing.  Change the bandage every day. · Keep the area clean and dry. Ice  · To help with pain and swelling, put ice or a cold pack on your groin for 10 to 20 minutes at a time. Put a thin cloth between the ice and your skin. Other instructions  · You will have a urinary catheter for 1 to 2 weeks. Your doctor or nurse will tell you how to care for it. · Be sure the catheter is securely taped to your thigh and attached to the large drainage bag when you are at home. Use the smaller leg bag only when you go out. · A little urine leakage around the catheter is normal. You can place an incontinence pad in your underwear to absorb urine leaks. · Do not have an enema or use a rectal thermometer for 3 months, or until your doctor says it is okay. Follow-up care is a key part of your treatment and safety. Be sure to make and go to all appointments, and call your doctor if you are having problems. It's also a good idea to know your test results and keep a list of the medicines you take. When should you call for help? Call 911 anytime you think you may need emergency care. For example, call if:  · You passed out (lost consciousness). · You have severe trouble breathing. · You have sudden chest pain and shortness of breath, or you cough up blood. Call your doctor now or seek immediate medical care if:  · Your catheter comes out. · You have increasing bleeding or blood clots in your urine. · You have pain that spreads from your back to your side. · Your catheter stops draining urine. · The urine in your catheter bag is cloudy or smells bad. · You have signs of a blood clot, such as:  ¨ Pain in your calf, back of the knee, thigh, or groin. ¨ Redness and swelling in your leg or groin. · You have pain that does not get better after you take your pain medicine. · You have loose stitches, or any of your incisions come open. · Bright red blood has soaked through the bandages over your incisions.   · You have signs of infection, such as:  ¨ Increased pain, swelling, warmth, or redness. ¨ Red streaks leading from the incision. ¨ Pus draining from the incision. ¨ Swollen lymph nodes in your neck, armpits, or groin. ¨ A fever. · You are sick to your stomach or cannot keep fluids down. Watch closely for any changes in your health, and be sure to contact your doctor if:  · You have trouble urinating after the doctor or nurse takes out your catheter. · Your urine is red or brown. · Urine leaks around the catheter all the time. · You do not have a bowel movement after taking a laxative. Where can you learn more? Go to http://jose manuel-nettie.info/. Enter D427 in the search box to learn more about \"Laparoscopic Radical Prostatectomy: What to Expect at Home. \"  Current as of: September 14, 2016  Content Version: 11.2  © 1896-3894 [employer]. Care instructions adapted under license by [employer] (which disclaims liability or warranty for this information). If you have questions about a medical condition or this instruction, always ask your healthcare professional. [address] any warranty or liability for your use of this information.

## 2017-04-06 NOTE — PROGRESS NOTES
The patient has requested home health nursing as listed in his pre-surgery information, that he will be alone at home. Using [place] [facility] he chose [address] and signed copy placed in the chart. CM contacted Dr Villalobos's office to inform him and left a message to call CM for home health order or denial for home health. Care Management Interventions  PCP Verified by CM: Yes  Mode of Transport at Discharge: Other (see comment) (family by car)  Transition of Care Consult (CM Consult): [address]: Yes  Discharge Durable Medical Equipment: No (no DME at home)  Physical Therapy Consult: No  Occupational Therapy Consult: No  Speech Therapy Consult: No  Current Support Network: Own Home, Lives Alone, Family Lives Nearby (was indpendent and driving prior to admission)  Confirm Follow Up Transport: Family (by car)   MARISOL Alatorre RN #[id number]

## 2017-04-06 NOTE — PROGRESS NOTES
End of Shift Nursing Note    Bedside shift change report given to Malcolm Lang (oncoming nurse) by Rachel Aguilar (offgoing nurse). Report included the following information SBAR, Kardex, Intake/Output and MAR. Zone Phone:   [phone]    Significant changes during shift:    none   Non-emergent issues for physician to address:   none     Number times ambulated in hallway past shift: 2      Number of times OOB to chair past shift: 0    POD #:      Vital Signs:    Temp: 98.5 °F (36.9 °C)     Pulse (Heart Rate): 80     BP: 141/72     Resp Rate: 18     O2 Sat (%): 93 %    Lines & Drains:     Urinary Catheter? Yes       NG tube [] in [] removed [] not applicable   Drains [] in [] removed [] not applicable     Skin Integrity:      Wounds: yes   Dressings Present: no    Wound Concerns: no      GI:    Current diet:  DIET CLEAR LIQUID    Nausea: NO  Vomiting: NO  Bowel Sounds: YES  Flatus: NO  Last Bowel Movement: yesterday   Appearance:     Respiratory:  Supplemental O2: No      Device:    via  Liters/min     Incentive Spirometer: YES  Volume: 1200  Coughing and Deep Breathing: YES  Oral Care: YES  Understanding (patient/family education): YES   Getting out of bed: YES  Head of bed elevation: YES    Patient Safety:    Falls Score: 2  Mobility Score: 1  Bed Alarm On? No  Sitter? No      Opportunity for questions and clarification was given to oncoming nurse. Patient bed is in low position, side rails are up x 2, door & observation blinds open as needed, call bell within reach and patient not in distress.     Ida Pineda RN

## 2017-04-06 NOTE — PROGRESS NOTES
Spiritual Care Partner Volunteer visited patient in Gen Surg on April 6, 2017.   Documented by:  DAPHNE Peck, [facility], [address] [facility] [address]     Paging Service  [phone])

## 2017-04-07 NOTE — PROGRESS NOTES
9:00am CM received a call from Mr. Mateus Sharpe stating that he has not heard from a home health agency. DARCIE informed him that she had talked to  Dr. Kati Murphy nurse yesterday afternoon, she had informed CM that she would follow up with request for home health with Dr. Alize Paulson and let Mr. Mateus Sharpe know the outcome. DARCIE called Dr Villalobos's office and left a message to call CM. 11:10am CM received a call back from Dr. Kati Murphy nurse. DARCIE informed her of patient's request, that he lives alone and he was very anxious about caring for the chance catheter alone. She stated she will contact Dr. Alize Paulson and get an order. CM called the patient and informed him of above and he will get a call from Dr. Kati Murphy office. If he does not hear from the office he can call CM back.  Rissa Maldonado RN #[id number]

## 2017-04-13 NOTE — ED NOTES
Pt arrives ambulatory to room. Pt c/o low abdominal pain and penile pain since 930 pm tonight. Pt had prostatectomy 1 week ago and had catheter removed yesterday. Monitor x 2. Call bell within reach and plan of care discussed.

## 2017-04-13 NOTE — ED NOTES
Bedside shift change report given to Jc Wilson (oncoming nurse) by Lissy Hartley (offgoing nurse). Report included the following information SBAR and ED Summary.

## 2017-04-13 NOTE — ED PROVIDER NOTES
HPI Comments: Chris Capone is a 78 y.o. male with PMHx significant for HTN, prostate cancer, and arthritis who presents ambulatory to the ED with c/o lower abdominal pain and urinary retention since ~9:00 PM tonight that woke him up from his sleep. Pt reports associated discomfort when attempting to urinate. Pt states that the last time he can recall using the bathroom was ~8:00 PM tonight. Pt reports being unable to fully empty his bladder all today. Upon chart review, pt had a prostatectomy done on 4/5/17 by Dr. Shanice Gutierrez. His catheter was removed yesterday morning and was not having any complications until today. Pt specifically denies any fevers or chills. PCP: Alejandra Malone MD   General Surgery: Leandro Silveira. Shanice Gutierrez MD    Social hx: + Smoker, + EtOH, - Illicit Drugs    There are no other complaints, changes, or physical findings at this time. Written by Kareen Ozuna ED Scribe, as dictated by Rima Reagan MD.        The history is provided by the patient. No  was used.         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) - surgery    Cancer ([address]     Prostate Cancer    Carotid artery occlusion     reports 100% occlusion on one side and 55-70% blockage in the over side (this has not changed in at least 5-6 yrs)/has doppler study on neck 2 times a yr    Chronic obstructive pulmonary disease (HCC)     Chronic pain     r/t arthritis    Congestive heart failure ([address]     Hyperlipidemia     Hypertension     S/P CABG x 3 12/6/2012       Past Surgical History:   Procedure Laterality Date    CABG, ARTERY-VEIN, THREE  12/6/2012 catherization    COLONOSCOPY N/A 8/19/2016    COLONOSCOPY performed by Fredo Jimenez MD at [facility] ENDOSCOPY    HX APPENDECTOMY      HX GI      colonoscopy 2012 - polyp    HX HEENT      tonsillectomy    HX HEENT  2015    Mouth Cancer surgery    HX ORTHOPAEDIC Left     hip replacement     HX ORTHOPAEDIC      plantar warts removed    HX ORTHOPAEDIC Right     foot    HX OTHER SURGICAL  12/3/15    bx right side of mouth/cancer removed with lymph nodes in neck removed         Family History:   Problem Relation Age of Onset    Heart Disease Mother     Hypertension Mother     Diabetes Mother     Psychiatric Disorder Father     Asthma Daughter     Anesth Problems Neg Hx        Social History     Social History    Marital status:      Spouse name: N/A    Number of children: N/A    Years of education: N/A     Occupational History    Not on file. Social History Main Topics    Smoking status: Former Smoker     Packs/day: 1.50     Years: 50.00     Quit date: 12/6/2012    Smokeless tobacco: Former User    Alcohol use 12.0 oz/week     20 Cans of beer per week    Drug use: Yes     Special: Prescription, OTC    Sexual activity: Not on file     Other Topics Concern    Not on file     Social History Narrative         ALLERGIES: Plavix [clopidogrel]    Review of Systems   Constitutional: Negative for chills and fever. HENT: Negative. Negative for congestion, rhinorrhea, sneezing and sore throat. Eyes: Negative. Negative for redness and visual disturbance. Respiratory: Negative. Negative for cough, shortness of breath and wheezing. Cardiovascular: Negative. Negative for chest pain and leg swelling. Gastrointestinal: Positive for abdominal pain (lower). Negative for diarrhea, nausea and vomiting. Genitourinary: Positive for difficulty urinating. Negative for discharge and frequency. Musculoskeletal: Negative. Negative for arthralgias, back pain, myalgias and neck stiffness. Skin: Negative. Negative for color change and rash. Neurological: Negative. Negative for dizziness, syncope, weakness, numbness and headaches. Hematological: Negative for adenopathy. Psychiatric/Behavioral: Negative. All other systems reviewed and are negative. Patient Vitals for the past 12 hrs:   Temp Pulse Resp BP SpO2   04/13/17 0140 97.5 °F (36.4 °C) 74 16 141/88 95 %            Physical Exam   Constitutional: He is oriented to person, place, and time. HENT:   Head: Atraumatic. Eyes: EOM are normal.   Cardiovascular: Normal rate, regular rhythm, normal heart sounds and intact distal pulses. Exam reveals no gallop and no friction rub. No murmur heard. Pulmonary/Chest: Effort normal and breath sounds normal. No respiratory distress. He has no wheezes. He has no rales. He exhibits no tenderness. Abdominal: Soft. Bowel sounds are normal. He exhibits distension. He exhibits no mass. There is tenderness. There is no rebound and no guarding.   + Tenderness and distention of lower abdomen. Musculoskeletal: Normal range of motion. He exhibits no edema or tenderness. Neurological: He is alert and oriented to person, place, and time. Psychiatric: He has a normal mood and affect. Nursing note and vitals reviewed. MDM  Number of Diagnoses or Management Options  Chance catheter in place:   Urinary retention:   Diagnosis management comments: Assessment/Plan: Urinary retention s/p prostatectomy. Amount and/or Complexity of Data Reviewed  Clinical lab tests: ordered and reviewed  Review and summarize past medical records: yes    Patient Progress  Patient progress: stable      Progress Note:  Pt had approximately 500 cc output with chance placement. Meagan Mckeon MD    Procedures    Progress note:  3:00 AM  Pt reports feeling better overall at time of discharge. No new complaints.   Meagan Mckeon MD      Written by ANA Turpin, as dictated by Meagan Mckeon, MD.    LABORATORY TESTS:  Recent Results (from the past 12 hour(s))   CBC WITH AUTOMATED DIFF    Collection Time: 04/13/17  2:07 AM   Result Value Ref Range    WBC 6.4 4.1 - 11.1 K/uL    RBC 4.26 4.10 - 5.70 M/uL    HGB 13.1 12.1 - 17.0 g/dL    HCT 38.5 36.6 - 50.3 %    MCV 90.4 80.0 - 99.0 FL    MCH 30.8 26.0 - 34.0 PG    MCHC 34.0 30.0 - 36.5 g/dL    RDW 12.9 11.5 - 14.5 %    PLATELET 687 090 - 375 K/uL    NEUTROPHILS 56 32 - 75 %    LYMPHOCYTES 28 12 - 49 %    MONOCYTES 12 5 - 13 %    EOSINOPHILS 3 0 - 7 %    BASOPHILS 1 0 - 1 %    ABS. NEUTROPHILS 3.7 1.8 - 8.0 K/UL    ABS. LYMPHOCYTES 1.8 0.8 - 3.5 K/UL    ABS. MONOCYTES 0.8 0.0 - 1.0 K/UL    ABS. EOSINOPHILS 0.2 0.0 - 0.4 K/UL    ABS. BASOPHILS 0.0 0.0 - 0.1 K/UL   METABOLIC PANEL, COMPREHENSIVE    Collection Time: 04/13/17  2:07 AM   Result Value Ref Range    Sodium 139 136 - 145 mmol/L    Potassium 3.9 3.5 - 5.1 mmol/L    Chloride 105 97 - 108 mmol/L    CO2 27 21 - 32 mmol/L    Anion gap 7 5 - 15 mmol/L    Glucose 116 (H) 65 - 100 mg/dL    BUN 21 (H) 6 - 20 MG/DL    Creatinine 1.11 0.70 - 1.30 MG/DL    BUN/Creatinine ratio 19 12 - 20      GFR est AA >60 >60 ml/min/1.73m2    GFR est non-AA >60 >60 ml/min/1.73m2    Calcium 8.9 8.5 - 10.1 MG/DL    Bilirubin, total 0.3 0.2 - 1.0 MG/DL    ALT (SGPT) 37 12 - 78 U/L    AST (SGOT) 26 15 - 37 U/L    Alk.  phosphatase 58 45 - 117 U/L    Protein, total 7.4 6.4 - 8.2 g/dL    Albumin 3.6 3.5 - 5.0 g/dL    Globulin 3.8 2.0 - 4.0 g/dL    A-G Ratio 0.9 (L) 1.1 - 2.2     URINALYSIS W/ REFLEX CULTURE    Collection Time: 04/13/17  2:42 AM   Result Value Ref Range    Color YELLOW/STRAW      Appearance CLOUDY (A) CLEAR      Specific gravity 1.011 1.003 - 1.030      pH (UA) 5.5 5.0 - 8.0      Protein 30 (A) NEG mg/dL    Glucose NEGATIVE  NEG mg/dL    Ketone NEGATIVE  NEG mg/dL    Bilirubin NEGATIVE  NEG      Blood LARGE (A) NEG      Urobilinogen 0.2 0.2 - 1.0 EU/dL    Nitrites NEGATIVE  NEG      Leukocyte Esterase SMALL (A) NEG WBC 5-10 0 - 4 /hpf    RBC 20-50 0 - 5 /hpf    Epithelial cells FEW FEW /lpf    Bacteria NEGATIVE  NEG /hpf    UA:UC IF INDICATED URINE CULTURE ORDERED (A) CNI      Hyaline cast 0-2 0 - 5 /lpf       MEDICATIONS GIVEN:  Medications   lidocaine (URO-JET) 2 % jelly ( Urethral Given 4/13/17 0215)       IMPRESSION:  1. Urinary retention    2. Ortega catheter in place        PLAN:  1. Current Discharge Medication List        2. Follow-up Information     Follow up With Details Comments [address], [place] Call today to schedule voiding trial [facility] [facility]  [phone]      [facility] [place] EM[facility]  As needed [address] [place] [address]  [phone]        Return to ED if worse     DISCHARGE NOTE:  3:51 AM  The patient is ready for discharge. The patients signs, symptoms, diagnosis, and instructions for discharge have been discussed and the pt has conveyed their understanding. The patient is to follow up as recommended with Aide Serna MD or return to the ER should their symptoms worsen. Plan has been discussed and patient has conveyed their agreement. This note is prepared by Sulma Xiao, acting as Scribe for Jae Alfredo MD.    Jae Alfredo MD: The scribe's documentation has been prepared under my direction and personally reviewed by me in its entirety. I confirm that the note above accurately reflects all work, treatment, procedures, and medical decision making performed by me.

## 2017-04-13 NOTE — ED NOTES
Patient received discharge instructions and questions were answered by ED MD Jose Antonio Thacker. Respirations even and unlabored, no signs or symptoms of cardiac distress noted at this time. Any wants or needs verbalized have been addressed to the best of our ability. Patient ambulated from ED with discharge instructions in hand. Wheelchair offered and declined by pt, educated patient on policy of discharge by wheelchair, verbalized understanding.

## 2017-07-25 PROBLEM — K56.609 SMALL BOWEL OBSTRUCTION (HCC): Status: ACTIVE | Noted: 2017-01-01

## 2017-07-25 PROBLEM — K56.609 SBO (SMALL BOWEL OBSTRUCTION) (HCC): Status: ACTIVE | Noted: 2017-01-01

## 2017-07-25 NOTE — PERIOP NOTES
Patient: Albino White MRN: [id number]  SSN: [id number]   YOB: 1950  Age: 78 y.o. Sex: male     Patient is status post Procedure(s):  LAPAROTOMY EXPLORATORY RIGHT COLECTOMY LIVER BIOPSY . Surgeon(s) and Role:     * Jesusita Eaton MD - Primary    Local/Dose/Irrigation:                    Peripheral IV 07/25/17 Left Antecubital (Active)   Site Assessment Clean, dry, & intact 7/25/2017  1:27 PM   Phlebitis Assessment 0 7/25/2017  1:27 PM   Infiltration Assessment 0 7/25/2017  1:27 PM   Dressing Status Clean, dry, & intact 7/25/2017  1:27 PM   Dressing Type Tape;Transparent 7/25/2017  1:27 PM   Hub Color/Line Status Pink; Infusing 7/25/2017  1:27 PM   Action Taken Blood drawn 7/25/2017  1:19 AM       Peripheral IV 07/25/17 Right Forearm (Active)   Site Assessment Clean, dry, & intact 7/25/2017  1:27 PM   Phlebitis Assessment 0 7/25/2017  1:27 PM   Infiltration Assessment 0 7/25/2017  1:27 PM   Dressing Status Clean, dry, & intact 7/25/2017  1:27 PM   Dressing Type Tape;Transparent 7/25/2017  1:27 PM   Hub Color/Line Status Pink; Infusing 7/25/2017  1:27 PM   Action Taken Blood drawn 7/25/2017  6:26 AM          Nasogastric Tube 07/25/17 (Active)   Site Assessment Clean, dry, & intact 7/25/2017  1:27 PM   Dressing Status Clean, dry, & intact 7/25/2017  1:27 PM   G Port Status Continuous Suction 7/25/2017  1:27 PM   External Insertion Vincent (cms) 63 cms 7/25/2017  5:16 AM   Action Taken Placement verified (comment) 7/25/2017  5:16 AM   Gastric Residual (mL) 150 ml 7/25/2017  5:16 AM   Intake (ml) 0 ml 7/25/2017  5:16 AM   Drainage Chamber Level (ml) 150 ml 7/25/2017  5:16 AM   Output (ml) 150 ml 7/25/2017  5:16 AM                     Dressing/Packing:  Wound Abdomen Anterior-DRESSING TYPE:  (honeycomb dressing, large) (07/25/17 1700)  Splint/Cast:  ]    Other:

## 2017-07-25 NOTE — ROUTINE PROCESS
TRANSFER - IN REPORT:    Verbal report received from MARIANELA Ruiz RN(name) on Morelia Pennington  being received from OR(unit) for routine post - op      Report consisted of patients Situation, Background, Assessment and   Recommendations(SBAR). Information from the following report(s) OR Summary was reviewed with the receiving nurse. Opportunity for questions and clarification was provided. Assessment completed upon patients arrival to unit and care assumed.

## 2017-07-25 NOTE — PROGRESS NOTES
End of Shift Nursing Note    Bedside shift change report given to Comfort Russell (oncoming nurse) by Marii Rivero (offgoing nurse). Report included the following information SBAR, Kardex, Procedure Summary, Intake/Output, MAR, Accordion and Recent Results. Significant changes during shift:  PRN pain meds, IVF as ordered. Pt to OR at noon, pt in PACU at change of shift. Non-emergent issues for physician to address:   none     Number times ambulated in hallway past shift: 0      Number of times OOB to chair past shift: 0       Vital Signs:    Temp: 98.5 °F (36.9 °C)     Pulse (Heart Rate): 62     BP: 155/77     Resp Rate: 20     O2 Sat (%): 95 %    Lines & Drains:     Urinary Catheter? No       NG tube [x] in [] removed [] not applicable   Drains [] in [] removed [x] not applicable     Skin Integrity:      Wounds: no   Dressings Present: no    Wound Concerns: no      GI:    Current diet:       Nausea: NO  Vomiting: NO  Bowel Sounds: YES  Flatus: YES  Last Bowel Movement: yesterday       Respiratory:  Supplemental O2: No        Incentive Spirometer: YES    Coughing and Deep Breathing: YES  Oral Care: YES  Understanding (patient/family education): YES   Getting out of bed: YES  Head of bed elevation: YES    Patient Safety:    Falls Score: 1  Mobility Score: 1  Bed Alarm On? No  Sitter? No      Opportunity for questions and clarification was given to oncoming nurse. Patient bed is in low position, side rails are up x 2, door & observation blinds open as needed, call bell within reach and patient not in distress.     Rosetta Ferrara RN

## 2017-07-25 NOTE — ED NOTES
15F NGT placed in right nare at 60 cm. Placement verified with auscultation. Ordered KUB to verify placement.

## 2017-07-25 NOTE — PERIOP NOTES
TRANSFER - IN REPORT:    Verbal report received from [facility]) on Trish Xavier  being received from General Surgery room 2121(unit) for ordered procedure      Report consisted of patients Situation, Background, Assessment and   Recommendations(SBAR). Information from the following report(s) SBAR, Intake/Output, MAR and Recent Results was reviewed with the receiving nurse. Opportunity for questions and clarification was provided. Assessment completed upon patients arrival to unit and care assumed.

## 2017-07-25 NOTE — ROUTINE PROCESS
sbar in note received pt from the floor via nurse julio césar elias r.n.. Pt identifies self and procedure for today. Vss.  Pt has been npo since 12 midnight.  ngt to lws

## 2017-07-25 NOTE — BRIEF OP NOTE
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  Postoperative Diagnosis: same  Procedure(s):  LAPAROTOMY, EXTENDED RIGHT COLECTOMY, LIVER BIOPSY   Surgeon(s) and Role:     * Surekha Trivedi MD - Primary         Assistant Staff:       Surgical Staff:  Circ-1: Sourav Benton RN  Scrub Tech-1: Matt Diaz  Surg Asst-1: Cecilia Duckworth RN  Event Time In   Incision Start 1535   Incision Close 94 20 56     Anesthesia: General   Estimated Blood Loss: <100 cc  Specimens:   ID Type Source Tests Collected by Time Destination   1 :  Xavier Talavera MD 7/25/2017 1637 Pathology   2 : extended right colon Preservative   Surekha Trivedi MD 7/25/2017 1728 Pathology   3 : liver biopsy Preservative   Surekha Trivedi MD 7/25/2017 1731 Pathology      Findings: bulky obstructing cecal mass with lymphadenopathy, solitary right dome of liver tumor implant   Complications: none  Implants: * No implants in log *

## 2017-07-25 NOTE — ED NOTES
Assumed care of pt from triage. Pt complaining of lower abdominal pain x1 wk that \"comes and goes\". Pt states pain started one day after CT scan. Pt denies urinary symptoms but states it feels like the pain he had after his prostate surgery. Pt in no acute distress at this time. Call bell within reach.

## 2017-07-25 NOTE — CDMP QUERY
Dr Jennie Mccray,     Please clarify if this patient is (was) being treated/managed for:     ? hyponatremia in setting of Na 134  ? Other Explanation of clinical findings  ? Unable to Determine (no explanation of clinical findings)    The medical record reflects the following risk factors, clinical indicators, and treatment    Risk Factors:  77 yo with abdominal pain  Clinical Indicators:  Na 134-134  Treatment: serial labs, Lalo@[facility]    REFERENCE:  Clinical research has shown that hyponatremia is strongly associated with an increased risk of mortality (ROM), even mild hyponatremia (serum Na levels 130-134 mEq/L) is associated with a 47% increase of inpatient mortality. Please clarify and document your clinical opinion in the progress notes and discharge summary including the definitive and/or presumptive diagnosis, (suspected or probable), related to the above clinical findings. Please include clinical findings supporting your diagnosis.     Thank you   Meghan Cazares RN/CCDS  [phone]

## 2017-07-25 NOTE — IP AVS SNAPSHOT
[address] 
[phone] Patient: Sydnee Diaz MRN: [id number] You are allergic to the following Allergen Reactions Plavix (Clopidogrel) Rash Recent Documentation Height Weight BMI Smoking Status 1.778 m 101.7 kg 32.18 kg/m2 Former Smoker Emergency Contacts Name Discharge Info Relation Home Work Mobile Miryam Brito DISCHARGE CAREGIVER [3] Other Relative [6] [phone] Asia Martinez DISCHARGE CAREGIVER [3] Daughter [21] [phone] [facility] DISCHARGE CAREGIVER [3] Daughter [21] [phone] About your hospitalization You were admitted on:  2017 You last received care in the:  [facility] 2 GENERAL SURGERY You were discharged on:  2017 Unit phone number:  [phone] Why you were hospitalized Your primary diagnosis was:  Sbo (Small Bowel Obstruction) (Hcc) Your diagnoses also included:  Small Bowel Obstruction (Hcc), Adenocarcinoma Of Cecum Stage, Ivb (Hcc) Providers Seen During Your Hospitalizations Provider Role Specialty Primary office phone Iron Abad MD Attending Provider Emergency Medicine [phone] Nathan Loving MD Attending Provider General Surgery [phone] Your Primary Care Physician (PCP) Primary Care Physician Office Phone Office Fax Leonel Head [phone] Follow-up Information Follow up With Details Comments Contact Info Mike Killian MD On 2017 Appt time 3:30 pm    NOTE: We are located in [address] [place] [address] [address] [facility] [address] 
[phone] Aydee Mario MD   [address] [facility] [address] [phone] Your Appointments 2017  3:30 PM EDT New Patient with Mike Killian MD  
 [address] [facility]) [address] [facility] [address].  
[phone] Current Discharge Medication List  
  
START taking these medications Dose & Instructions Dispensing Information Comments Morning Noon Evening Bedtime HYDROcodone-acetaminophen 5-325 mg per tablet Commonly known as:  Toshia Hassan Your last dose was: Your next dose is:    
   
   
 Dose:  1-2 Tab Take 1-2 Tabs by mouth every four (4) hours as needed. Max Daily Amount: 12 Tabs. Quantity:  20 Tab Refills:  0 CONTINUE these medications which have NOT CHANGED Dose & Instructions Dispensing Information Comments Morning Noon Evening Bedtime  
 aspirin 81 mg chewable tablet Your last dose was: Your next dose is:    
   
   
 Dose:  81 mg Take 81 mg by mouth daily. Refills:  0  
     
   
   
   
  
 atorvastatin 40 mg tablet Commonly known as:  LIPITOR Your last dose was: Your next dose is:    
   
   
 Dose:  40 mg Take 40 mg by mouth daily. Refills:  0  
     
   
   
   
  
 lisinopril 5 mg tablet Commonly known as:  Nitin Peek Your last dose was: Your next dose is:    
   
   
 Dose:  5 mg Take 5 mg by mouth daily. Refills:  0  
     
   
   
   
  
 metoprolol succinate 25 mg XL tablet Commonly known as:  TOPROL-XL Your last dose was: Your next dose is:    
   
   
 Dose:  25 mg Take 25 mg by mouth daily. Refills:  0 MULTIVITAMIN PO Your last dose was: Your next dose is: Take  by mouth. For men 48 +. Takes one po every other day. Refills:  0  
     
   
   
   
  
 sildenafil citrate 50 mg tablet Commonly known as:  VIAGRA Your last dose was: Your next dose is: Take  by mouth as needed. Indications: unsure of dosage Refills:  0  
     
   
   
   
  
 TYLENOL EXTRA STRENGTH 500 mg tablet Generic drug:  acetaminophen Your last dose was: Your next dose is:    
   
   
 Dose:  500 mg Take 500 mg by mouth as needed for Pain. Refills:  0 STOP taking these medications   
 cephALEXin 500 mg capsule Commonly known as:  KEFLEX  
   
  
 oxyCODONE-acetaminophen 5-325 mg per tablet Commonly known as:  PERCOCET Where to Get Your Medications Information on where to get these meds will be given to you by the nurse or doctor. ! Ask your nurse or doctor about these medications HYDROcodone-acetaminophen 5-325 mg per tablet Discharge Instructions Open Bowel Resection: What to Expect at Home Your Recovery You are likely to have pain that comes and goes for the next few days after bowel surgery. You may have bowel cramps, and your cut (incision) may hurt. You may also feel like you have the flu. You may have a low fever and feel tired and nauseated. This is common. You should feel better after a week and will probably be back to normal in 2 to 3 weeks. This care sheet gives you a general idea about how long it will take for you to recover. But each person recovers at a different pace. Follow the steps below to get better as quickly as possible. How can you care for yourself at home? Activity · Rest when you feel tired. Getting enough sleep will help you recover. · Try to walk each day. Start by walking a little more than you did the day before. Bit by bit, increase the amount you walk. Walking boosts blood flow and helps prevent pneumonia and constipation. · Avoid strenuous activities, such as biking, jogging, weight lifting, or aerobic exercise, until your doctor says it is okay. · Ask your doctor when you can drive again. · You will probably need to take 3 to 4 weeks off from work. It depends on the type of work you do and how you feel. You may need to take off 4 to 6 weeks if you lift heavy objects in your job. · You may shower 24 to 48 hours after surgery, if your doctor says it is okay. Pat the cut (incision) dry. Do not take a bath for the first 2 weeks, or until your doctor tells you it is okay. · Ask your doctor when it is okay for you to have sex. Diet · You may not have much appetite after the surgery. But try to eat a healthy diet. Your doctor will tell you about any foods you should not eat. · Eat a low-fiber diet for several weeks after surgery. Eat many small meals throughout the day. Add high-fiber foods a little at a time. · Eat yogurt. It puts good bacteria into your colon and helps prevent diarrhea. · Try to avoid nuts, seeds, and corn for a while. They may be hard to digest. 
· You may need to take vitamins that contain sodium and potassium. Ask your doctor. · Drink plenty of fluids to avoid becoming dehydrated. Medicines · Your doctor will tell you if and when you can restart your medicines. He or she will also give you instructions about taking any new medicines. · If you take blood thinners, such as warfarin (Coumadin), clopidogrel (Plavix), or aspirin, be sure to talk to your doctor. He or she will tell you if and when to start taking those medicines again. Make sure that you understand exactly what your doctor wants you to do. · Take pain medicines exactly as directed. ¨ If the doctor gave you a prescription medicine for pain, take it as prescribed. ¨ If you are not taking a prescription pain medicine, ask your doctor if you can take an over-the-counter medicine. ¨ Do not take two or more pain medicines at the same time unless the doctor told you to. Many pain medicines have acetaminophen, which is Tylenol. Too much acetaminophen (Tylenol) can be harmful. · If you think your pain medicine is making you sick to your stomach: 
¨ Take your medicine after meals (unless your doctor tells you not to). ¨ Ask your doctor for a different pain medicine. · If your doctor prescribed antibiotics, take them as directed. Do not stop taking them just because you feel better. You need to take the full course of antibiotics. · You may need to take some medicines in a different form. You will be told whether to crush pills or take a liquid form of the medicine. · If your doctor gives you a stool softener, take it as directed. Incision care · If you have strips of tape on the incision, leave the tape on for a week or until it falls off. · Wash the area daily with warm, soapy water, and pat it dry. Follow-up care is a key part of your treatment and safety. Be sure to make and go to all appointments, and call your doctor if you are having problems. It's also a good idea to know your test results and keep a list of the medicines you take. When should you call for help? Call 911 anytime you think you may need emergency care. For example, call if: 
· You passed out (lost consciousness). · You have sudden chest pain and shortness of breath, or you cough up blood. · You have severe pain in your belly. Call your doctor now or seek immediate medical care if: 
· You are sick to your stomach and cannot drink fluids or keep them down. · You have signs of a blood clot, such as: 
¨ Pain in your calf, back of the knee, thigh, or groin. ¨ Redness and swelling in your leg or groin. · You have a lot of diarrhea that smells very bad. · You have trouble passing urine or stool, especially if you have mild pain or swelling in your lower belly. · You have signs of infection, such as: 
¨ Increased pain, swelling, warmth, or redness. ¨ Red streaks leading from the incision. ¨ Pus draining from the incision. ¨ A fever. · You have pain that does not get better after you take pain medicine. · You have loose stitches, or your incision comes open. · You are bleeding or have new drainage from the incision. Watch closely for any changes in your health, and be sure to contact your doctor if: 
· You do not have a bowel movement after taking a laxative. · You do not get better as expected. Where can you learn more? Go to http://jose manuel-nettie.info/. Enter 047 8001 in the search box to learn more about \"Open Bowel Resection: What to Expect at Home. \" Current as of: August 9, 2016 Content Version: 11.3 © 9742-3221 [email or web address]. Care instructions adapted under license by [employer] (which disclaims liability or warranty for this information). If you have questions about a medical condition or this instruction, always ask your healthcare professional. [address] any warranty or liability for your use of this information. Discharge Orders None [employer] Announcement We are excited to announce that we are making your provider's discharge notes available to you in Skub[employer]. You will see these notes when they are completed and signed by the physician that discharged you from your recent hospital stay. If you have any questions or concerns about any information you see in Skubana, please call the Health Information Department where you were seen or reach out to your Primary Care Provider for more information about your plan of care. Introducing [facility]! Dear Waqas Cagle: Thank you for requesting a Skubana account. Our records indicate that you have previously registered for a Skub[employer] account but its currently inactive. Please call our [employer] support line at [phone]. Additional Information If you have questions, please visit the Frequently Asked Questions section of the [employer] website at [email or web address] Remember, Skubana is NOT to be used for urgent needs.  For medical emergencies, dial 911. Now available from your iPhone and Android! General Information Please provide this summary of care documentation to your next provider. Patient Signature:  ____________________________________________________________ Date:  ____________________________________________________________  
  
Zulema Camp [place] Provider Signature:  ____________________________________________________________ Date:  ____________________________________________________________

## 2017-07-25 NOTE — ANESTHESIA PREPROCEDURE EVALUATION
Anesthetic History               Review of Systems / Medical History  Patient summary reviewed, nursing notes reviewed and pertinent labs reviewed    Pulmonary    COPD            Comments: Former smoker - Quit 2012 - 75 pack years   Neuro/Psych   Within defined limits           Cardiovascular    Hypertension: well controlled      CHF    CAD, CABG and hyperlipidemia    Exercise tolerance: >4 METS  Comments: Bilateral carotid stenosis (Right - 100%, Left - 60%)   GI/Hepatic/Renal               Comments: SBO - Cecal mass with lymphadenopathy  Liver lesion on CT Endo/Other        Arthritis and cancer    Comments: Head and neck cancer s/p resection (12/3/15)  Prostate cancer Other Findings            Physical Exam    Airway  Mallampati: III  TM Distance: > 6 cm  Neck ROM: normal range of motion   Mouth opening: Normal     Cardiovascular  Regular rate and rhythm,  S1 and S2 normal,  no murmur, click, rub, or gallop             Dental    Dentition: Edentulous, Full lower dentures and Full upper dentures     Pulmonary  Breath sounds clear to auscultation               Abdominal  GI exam deferred       Other Findings            Anesthetic Plan    ASA: 3  Anesthesia type: general          Induction: Intravenous  Anesthetic plan and risks discussed with: Patient

## 2017-07-25 NOTE — ED NOTES
TRANSFER - OUT REPORT:    Verbal report given to Cecille Rodriguez RN (name) on Caryle Hail  being transferred to Gen Surg(unit) for routine progression of care       Report consisted of patients Situation, Background, Assessment and   Recommendations(SBAR). Information from the following report(s) SBAR, Kardex, ED Summary, [facility] and Recent Results was reviewed with the receiving nurse. Lines:   Peripheral IV 07/25/17 Left Antecubital (Active)   Site Assessment Clean, dry, & intact 7/25/2017  1:19 AM   Phlebitis Assessment 0 7/25/2017  1:19 AM   Infiltration Assessment 0 7/25/2017  1:19 AM   Dressing Status Clean, dry, & intact 7/25/2017  1:19 AM   Dressing Type Transparent;Tape 7/25/2017  1:19 AM   Hub Color/Line Status Pink;Patent; Flushed 7/25/2017  1:19 AM   Action Taken Blood drawn 7/25/2017  1:19 AM        Opportunity for questions and clarification was provided.       Patient transported with:   [employer]

## 2017-07-25 NOTE — PERIOP NOTES
TRANSFER - OUT REPORT:    Verbal report given to [facility] RN(name) on Brandon Arellano  being transferred to 2121(unit) for routine progression of care       Report consisted of patients Situation, Background, Assessment and   Recommendations(SBAR). Information from the following report(s) OR Summary, Procedure Summary, Intake/Output and MAR was reviewed with the receiving nurse. Opportunity for questions and clarification was provided.       Patient transported with:   [employer] @ 3 liters  [employer]

## 2017-07-25 NOTE — PROGRESS NOTES
TRANSFER - IN REPORT:    Verbal report received from Tristan Chase RN (name) on Roger Noe  being received from ED (unit) for routine progression of care      Report consisted of patients Situation, Background, Assessment and   Recommendations(SBAR). Information from the following report(s) SBAR, Kardex, Intake/Output, MAR, Recent Results and Med Rec Status was reviewed with the receiving nurse. Opportunity for questions and clarification was provided. Assessment completed upon patients arrival to unit and care assumed.

## 2017-07-25 NOTE — IP AVS SNAPSHOT
[address] [facility] [address] 
[phone] Patient: Daniela Vizcaino MRN: [id number] CTN:[id number] Current Discharge Medication List  
  
START taking these medications Dose & Instructions Dispensing Information Comments Morning Noon Evening Bedtime HYDROcodone-acetaminophen 5-325 mg per tablet Commonly known as:  Yaerly Fore Your last dose was: Your next dose is:    
   
   
 Dose:  1-2 Tab Take 1-2 Tabs by mouth every four (4) hours as needed. Max Daily Amount: 12 Tabs. Quantity:  20 Tab Refills:  0 CONTINUE these medications which have NOT CHANGED Dose & Instructions Dispensing Information Comments Morning Noon Evening Bedtime  
 aspirin 81 mg chewable tablet Your last dose was: Your next dose is:    
   
   
 Dose:  81 mg Take 81 mg by mouth daily. Refills:  0  
     
   
   
   
  
 atorvastatin 40 mg tablet Commonly known as:  LIPITOR Your last dose was: Your next dose is:    
   
   
 Dose:  40 mg Take 40 mg by mouth daily. Refills:  0  
     
   
   
   
  
 lisinopril 5 mg tablet Commonly known as:  Carrie Damaris Your last dose was: Your next dose is:    
   
   
 Dose:  5 mg Take 5 mg by mouth daily. Refills:  0  
     
   
   
   
  
 metoprolol succinate 25 mg XL tablet Commonly known as:  TOPROL-XL Your last dose was: Your next dose is:    
   
   
 Dose:  25 mg Take 25 mg by mouth daily. Refills:  0 MULTIVITAMIN PO Your last dose was: Your next dose is: Take  by mouth. For men 48 +. Takes one po every other day. Refills:  0  
     
   
   
   
  
 sildenafil citrate 50 mg tablet Commonly known as:  VIAGRA Your last dose was: Your next dose is: Take  by mouth as needed. Indications: unsure of dosage Refills:  0  
     
   
   
   
  
 TYLENOL EXTRA STRENGTH 500 mg tablet Generic drug:  acetaminophen Your last dose was: Your next dose is:    
   
   
 Dose:  500 mg Take 500 mg by mouth as needed for Pain. Refills:  0 STOP taking these medications   
 cephALEXin 500 mg capsule Commonly known as:  KEFLEX  
   
  
 oxyCODONE-acetaminophen 5-325 mg per tablet Commonly known as:  PERCOCET Where to Get Your Medications Information on where to get these meds will be given to you by the nurse or doctor. ! Ask your nurse or doctor about these medications HYDROcodone-acetaminophen 5-325 mg per tablet

## 2017-07-25 NOTE — H&P
Surgery History and Physcial    Subjective:      Arlen Butts is a 78 y.o. male who presents for evaluation of abdominal pain. The pain is located in the diffuse mid abdomen without radiation. Pain is described as aching and cramping and measures intermittently up to 10/10 in intensity. Onset of pain was 5 days ago and has been progressive. Aggravating factors include eating. Alleviating factors include none. Associated symptoms include anorexia and diarrhea. Pt is 3 1/2 months s/p robotic prostatectomy for prostate ca with Dr. 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 He is being followed for a cystic mass medial to the left humeral head in the pelvis medially displacing the vessels which appears to be related to his prior left hip replacement and appears stable on this CT. He was seen by Dr. Osker Bloch for this about 4 months ago.   He is also s/p appendectomy and CABG in the past.  He had a normal colonoscopy last August.      Patient Active Problem List    Diagnosis Date Noted    SBO (small bowel obstruction) ([address] 07/25/2017    Small bowel obstruction ([address] 07/25/2017    Prostate cancer ([address] 04/05/2017    Malignant neoplasm of retromolar area ([address] 12/16/2015    S/P CABG x 3 12/06/2012    CAD (coronary artery disease) 11/20/2012     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) - surgery    Cancer ([address]     Prostate Cancer    Carotid artery occlusion     reports 100% occlusion on one side and 55-70% blockage in the over side (this has not changed in at least 5-6 yrs)/has doppler study on neck 2 times a yr    Chronic obstructive pulmonary disease (HCC)     Chronic pain     r/t arthritis    Congestive heart failure (HCC)     Hyperlipidemia     Hypertension     S/P CABG x 3 12/6/2012      Past Surgical History:   Procedure Laterality Date    CABG, ARTERY-VEIN, THREE  12/6/2012    catherization    COLONOSCOPY N/A 8/19/2016    COLONOSCOPY performed by Perfecto Encarnacion MD at [facility] ENDOSCOPY    HX APPENDECTOMY      HX GI      colonoscopy 2012 - polyp    HX HEENT      tonsillectomy    HX HEENT  2015    Mouth Cancer surgery    HX ORTHOPAEDIC Left     hip replacement     HX ORTHOPAEDIC      plantar warts removed    HX ORTHOPAEDIC Right     foot    HX OTHER SURGICAL  12/3/15    bx right side of mouth/cancer removed with lymph nodes in neck removed      Social History   Substance Use Topics    Smoking status: Former Smoker     Packs/day: 1.50     Years: 50.00     Quit date: 12/6/2012    Smokeless tobacco: Former User    Alcohol use 12.0 oz/week     20 Cans of beer per week      Family History   Problem Relation Age of Onset    Heart Disease Mother     Hypertension Mother     Diabetes Mother     Psychiatric Disorder Father     Asthma Daughter     Anesth Problems Neg Hx       Prior to Admission medications    Medication Sig Start Date End Date Taking? Authorizing Provider   cephALEXin (KEFLEX) 500 mg capsule Take 1 Cap by mouth three (3) times daily. 4/6/17   Calvin Love MD   oxyCODONE-acetaminophen (PERCOCET) 5-325 mg per tablet Take 1-2 Tabs by mouth every four (4) hours as needed. Max Daily Amount: 12 Tabs. 4/6/17   Calvin Love MD   atorvastatin (LIPITOR) 40 mg tablet Take 40 mg by mouth daily. Historical Provider   lisinopril (PRINIVIL, ZESTRIL) 5 mg tablet Take 5 mg by mouth daily. Historical Provider   MULTIVITAMIN PO Take  by mouth. For men 48 +. Takes one po every other day.     Historical Provider   metoprolol succinate (TOPROL-XL) 25 mg XL tablet Take 25 mg by mouth daily. Historical Provider   acetaminophen (TYLENOL EXTRA STRENGTH) 500 mg tablet Take 500 mg by mouth as needed for Pain. Historical Provider     Allergies   Allergen Reactions    Plavix [Clopidogrel] Rash         Review of Systems   Constitutional: Positive for appetite change. Negative for chills, diaphoresis, fever and unexpected weight change. Respiratory: Negative for shortness of breath and wheezing. Cardiovascular: Negative for chest pain and palpitations. Gastrointestinal: Positive for abdominal distention, abdominal pain, diarrhea and nausea. Negative for vomiting. Musculoskeletal: Negative for myalgias. Hematological: Does not bruise/bleed easily. Objective:     Visit Vitals    /87 (BP 1 Location: Left arm, BP Patient Position: At rest;Supine)    Pulse 78    Temp 97.7 °F (36.5 °C)    Resp 16    Ht 5' 10\" (1.778 m)    Wt 208 lb 5.4 oz (94.5 kg)    SpO2 94%    BMI 29.89 kg/m2       Physical Exam   Constitutional: He appears well-developed and well-nourished. No distress. HENT:   Head: Normocephalic and atraumatic. Cardiovascular: Normal rate, regular rhythm, normal heart sounds and intact distal pulses. Pulmonary/Chest: Breath sounds normal. He has no wheezes. He has no rales. Abdominal: Soft. Bowel sounds are normal. He exhibits distension. He exhibits no mass. There is no hepatosplenomegaly. There is generalized tenderness. There is no rebound and no guarding. No hernia. Well healed laparoscopy incisions s/p recent robotic prostatectomy   Musculoskeletal: Normal range of motion. Lymphadenopathy:     He has no cervical adenopathy.        Imaging:  images and reports reviewed    Lab Review:    Recent Results (from the past 24 hour(s))   CBC WITH AUTOMATED DIFF    Collection Time: 07/25/17  1:21 AM   Result Value Ref Range    WBC 9.3 4.1 - 11.1 K/uL    RBC 4.60 4.10 - 5.70 M/uL    HGB 14.2 12.1 - 17.0 g/dL    HCT 42.1 36.6 - 50.3 %    MCV 91.5 80.0 - 99.0 FL    MCH 30.9 26.0 - 34.0 PG    MCHC 33.7 30.0 - 36.5 g/dL    RDW 13.4 11.5 - 14.5 %    PLATELET 116 468 - 098 K/uL    NEUTROPHILS 78 (H) 32 - 75 %    LYMPHOCYTES 17 12 - 49 %    MONOCYTES 4 (L) 5 - 13 %    EOSINOPHILS 1 0 - 7 %    BASOPHILS 0 0 - 1 %    ABS. NEUTROPHILS 7.4 1.8 - 8.0 K/UL    ABS. LYMPHOCYTES 1.6 0.8 - 3.5 K/UL    ABS. MONOCYTES 0.3 0.0 - 1.0 K/UL    ABS. EOSINOPHILS 0.1 0.0 - 0.4 K/UL    ABS. BASOPHILS 0.0 0.0 - 0.1 K/UL   METABOLIC PANEL, COMPREHENSIVE    Collection Time: 07/25/17  1:21 AM   Result Value Ref Range    Sodium 134 (L) 136 - 145 mmol/L    Potassium 4.1 3.5 - 5.1 mmol/L    Chloride 101 97 - 108 mmol/L    CO2 26 21 - 32 mmol/L    Anion gap 7 5 - 15 mmol/L    Glucose 167 (H) 65 - 100 mg/dL    BUN 15 6 - 20 MG/DL    Creatinine 1.34 (H) 0.70 - 1.30 MG/DL    BUN/Creatinine ratio 11 (L) 12 - 20      GFR est AA >60 >60 ml/min/1.73m2    GFR est non-AA 53 (L) >60 ml/min/1.73m2    Calcium 9.2 8.5 - 10.1 MG/DL    Bilirubin, total 0.5 0.2 - 1.0 MG/DL    ALT (SGPT) 29 12 - 78 U/L    AST (SGOT) 22 15 - 37 U/L    Alk.  phosphatase 58 45 - 117 U/L    Protein, total 7.8 6.4 - 8.2 g/dL    Albumin 4.0 3.5 - 5.0 g/dL    Globulin 3.8 2.0 - 4.0 g/dL    A-G Ratio 1.1 1.1 - 2.2     TYPE & SCREEN    Collection Time: 07/25/17  2:57 AM   Result Value Ref Range    Crossmatch Expiration 07/28/2017     ABO/Rh(D) A NEGATIVE     Antibody screen NEG    PROTHROMBIN TIME + INR    Collection Time: 07/25/17  2:57 AM   Result Value Ref Range    INR 1.0 0.9 - 1.1      Prothrombin time 10.2 9.0 - 07.4 sec   METABOLIC PANEL, COMPREHENSIVE    Collection Time: 07/25/17  5:43 AM   Result Value Ref Range    Sodium 134 (L) 136 - 145 mmol/L    Potassium 4.2 3.5 - 5.1 mmol/L    Chloride 98 97 - 108 mmol/L    CO2 28 21 - 32 mmol/L    Anion gap 8 5 - 15 mmol/L    Glucose 124 (H) 65 - 100 mg/dL    BUN 16 6 - 20 MG/DL    Creatinine 1.12 0.70 - 1.30 MG/DL    BUN/Creatinine ratio 14 12 - 20      GFR est AA >60 >60 ml/min/1.73m2 GFR est non-AA >60 >60 ml/min/1.73m2    Calcium 9.4 8.5 - 10.1 MG/DL    Bilirubin, total 0.6 0.2 - 1.0 MG/DL    ALT (SGPT) 28 12 - 78 U/L    AST (SGOT) 22 15 - 37 U/L    Alk. phosphatase 55 45 - 117 U/L    Protein, total 7.9 6.4 - 8.2 g/dL    Albumin 4.0 3.5 - 5.0 g/dL    Globulin 3.9 2.0 - 4.0 g/dL    A-G Ratio 1.0 (L) 1.1 - 2.2     CBC WITH AUTOMATED DIFF    Collection Time: 07/25/17  5:43 AM   Result Value Ref Range    WBC 8.7 4.1 - 11.1 K/uL    RBC 4.47 4.10 - 5.70 M/uL    HGB 13.9 12.1 - 17.0 g/dL    HCT 40.8 36.6 - 50.3 %    MCV 91.3 80.0 - 99.0 FL    MCH 31.1 26.0 - 34.0 PG    MCHC 34.1 30.0 - 36.5 g/dL    RDW 13.4 11.5 - 14.5 %    PLATELET 143 192 - 523 K/uL    NEUTROPHILS 76 (H) 32 - 75 %    LYMPHOCYTES 22 12 - 49 %    MONOCYTES 2 (L) 5 - 13 %    EOSINOPHILS 0 0 - 7 %    BASOPHILS 0 0 - 1 %    ABS. NEUTROPHILS 6.6 1.8 - 8.0 K/UL    ABS. LYMPHOCYTES 1.9 0.8 - 3.5 K/UL    ABS. MONOCYTES 0.2 0.0 - 1.0 K/UL    ABS. EOSINOPHILS 0.0 0.0 - 0.4 K/UL    ABS.  BASOPHILS 0.0 0.0 - 0.1 K/UL   EKG, 12 LEAD, INITIAL    Collection Time: 07/25/17  6:01 AM   Result Value Ref Range    Ventricular Rate 88 BPM    Atrial Rate 88 BPM    P-R Interval 162 ms    QRS Duration 142 ms    Q-T Interval 400 ms    QTC Calculation (Bezet) 484 ms    Calculated P Axis 44 degrees    Calculated R Axis -67 degrees    Calculated T Axis 6 degrees    Diagnosis       ** Poor data quality, interpretation may be adversely affected  Undetermined rhythm  Nonspecific intraventricular block  Cannot rule out Septal infarct , age undetermined  When compared with ECG of 06-DEC-2012 14:58,  Current undetermined rhythm precludes rhythm comparison, needs review  Questionable change in QRS duration           Assessment:     Abdominal pain, suspect mechanical obstruction of distal SB at ileocecal valve, likely related to malignant neoplasm of the cecum with associated mesenteric lymphadenopathy and also with 1 subcentimeter worrisome appearing lesion in the right hepatic dome. Plan:     1. I recommend proceeding with exploratory laparotomy, extended right colectomy with lymphadenectomy, biopsy of liver lesion if amenable. Pt at risk for perforation without surgical intervention and it is unlikely that decompression alone will be successful in relieving this presumed malignant obstruction. Will start Zosyn given intestinal obstruction and need for surgical resection. 2. Discussed aspects of surgical intervention, methods, risks including by not limited to infection, bleeding, hematoma, and perforation of the intestines or solid organs, possible need for additional procedures and treatments including chemotherapy, small risk of need for ostomy, and the risks of general anesthetic. The patient understands the risks; any and all questions were answered to the patient's satisfaction.     Signed By: James Marrero MD, [facility]    July 25, 2017

## 2017-07-25 NOTE — ANESTHESIA POSTPROCEDURE EVALUATION
Post-Anesthesia Evaluation and Assessment    Patient: Maxi Christian MRN: [id number]  SSN: [id number]    YOB: 1950  Age: 78 y.o. Sex: male       Cardiovascular Function/Vital Signs  Visit Vitals    BP (P) 147/88 (BP 1 Location: Left arm, BP Patient Position: At rest)    Pulse 98    Temp (P) 36.9 °C (98.4 °F)    Resp 16    Ht 5' 10\" (1.778 m)    Wt 94.3 kg (208 lb)    SpO2 93%    BMI 29.84 kg/m2       Patient is status post general anesthesia for Procedure(s):  LAPAROTOMY EXPLORATORY RIGHT COLECTOMY LIVER BIOPSY . Nausea/Vomiting: None    Postoperative hydration reviewed and adequate. Pain:  Pain Scale 1: Numeric (0 - 10) (07/25/17 1845)  Pain Intensity 1: 4 (07/25/17 1845)   Managed    Neurological Status:   Neuro (WDL): Exceptions to WDL (07/25/17 1744)  Neuro  Neurologic State: Drowsy; Eyes open spontaneously;Sleeping (07/25/17 1744)  Orientation Level: Oriented to person;Oriented to place;Oriented to situation (07/25/17 1744)  Cognition: Follows commands (07/25/17 1744)  Speech: Clear (07/25/17 1744)  LUE Motor Response: Purposeful (07/25/17 1744)  LLE Motor Response: Purposeful (07/25/17 1744)  RUE Motor Response: Purposeful (07/25/17 1744)  RLE Motor Response: Purposeful (07/25/17 1744)   At baseline    Mental Status and Level of Consciousness: Arousable    Pulmonary Status:   O2 Device: (P) Nasal cannula (07/25/17 1900)   Adequate oxygenation and airway patent    Complications related to anesthesia: None    Post-anesthesia assessment completed.  No concerns    Signed By: Shelley Funes MD     July 25, 2017

## 2017-07-25 NOTE — ED PROVIDER NOTES
HPI Comments: Anastacio Corcoran is a 78 y.o. Male, with a pertinent PMHx of HTN, who presents ambulatory to the ED [facility] ED c/o intermittent epigastric abdominal pain x 1 week, worsening last night. Pt notes associated symptom of diarrhea. Pt reports a prostatectomy performed on 04/06/2017 by Dr. Lloyd Dubois. He was sent home with a catheter in place for 1 week and had it removed. He stated the catheter should not have been removed early and was re-catheterized once more for a week and then subsequently removed. He additionally noted he normally has 2 beers every afternoon, but yesterday he did not and that was the only change in his diet. Pt reports a PSHx of an appendectomy and triple bypass in 2012. He states he is currently on 81 mg ASA for a blockage in his heart. Pt specifically denies nausea, vomiting, fever, chills, urinary discomfort, decreased urine, constipation, blood in his stool, or black tarry stools. Social hx: - (former) Tobacco use, + EtOH use, + Illicit drug use    PCP: Gilberto Heard MD  Cardiology: Dr. Akash Grayson MD  Urology: Dr. Luz Calles    There are no other complaints, changes or physical findings at this time. The history is provided by the patient. No  was used.         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) - surgery    Cancer ([address]     Prostate Cancer    Carotid artery occlusion     reports 100% occlusion on one side and 55-70% blockage in the over side (this has not changed in at least 5-6 yrs)/has doppler study on neck 2 times a yr    Chronic obstructive pulmonary disease (HCC)     Chronic pain     r/t arthritis  Congestive heart failure (HCC)     Hyperlipidemia     Hypertension     S/P CABG x 3 12/6/2012       Past Surgical History:   Procedure Laterality Date    CABG, ARTERY-VEIN, THREE  12/6/2012    catherization    COLONOSCOPY N/A 8/19/2016    COL[facility] ENDOSCOPY    HX APPENDECTOMY      HX GI      colonoscopy 2012 - polyp    HX HEENT      tonsillectomy    HX HEENT  2015    Mouth Cancer surgery    HX ORTHOPAEDIC Left     hip replacement     HX ORTHOPAEDIC      plantar warts removed    HX ORTHOPAEDIC Right     foot    HX OTHER SURGICAL  12/3/15    bx right side of mouth/cancer removed with lymph nodes in neck removed         Family History:   Problem Relation Age of Onset    Heart Disease Mother     Hypertension Mother     Diabetes Mother     Psychiatric Disorder Father     Asthma Daughter     Anesth Problems Neg Hx        Social History     Social History    Marital status:      Spouse name: N/A    Number of children: N/A    Years of education: N/A     Occupational History    Not on file. Social History Main Topics    Smoking status: Former Smoker     Packs/day: 1.50     Years: 50.00     Quit date: 12/6/2012    Smokeless tobacco: Former User    Alcohol use 12.0 oz/week     20 Cans of beer per week    Drug use: Yes     Special: Prescription, OTC    Sexual activity: Not on file     Other Topics Concern    Not on file     Social History Narrative         ALLERGIES: Plavix [clopidogrel]    Review of Systems   Constitutional: Negative for activity change, chills and fever. HENT: Negative for congestion and sore throat. Eyes: Negative for pain and redness. Respiratory: Negative for cough, chest tightness and shortness of breath. Cardiovascular: Negative for chest pain and palpitations. Gastrointestinal: Positive for abdominal pain (+epigastric) and diarrhea. Negative for blood in stool, constipation, nausea and vomiting. -black tarry stool   Genitourinary: Negative for decreased urine volume, dysuria, frequency and urgency. Musculoskeletal: Negative for back pain and neck pain. Skin: Negative for rash. Neurological: Negative for syncope, light-headedness and headaches. Psychiatric/Behavioral: Negative for confusion. All other systems reviewed and are negative. Vitals:    07/25/17 0023   BP: (!) 147/96   Pulse: 94   Resp: 16   Temp: 97.5 °F (36.4 °C)   SpO2: 97%   Weight: 94.5 kg (208 lb 5.4 oz)   Height: 5' 10\" (1.778 m)            Physical Exam   Constitutional: He is oriented to person, place, and time. He appears well-developed and well-nourished. No distress. HENT:   Head: Normocephalic. Nose: Nose normal.   Mouth/Throat: Oropharynx is clear and moist. No oropharyngeal exudate. Eyes: Conjunctivae are normal. Pupils are equal, round, and reactive to light. No scleral icterus. Neck: Normal range of motion. Neck supple. No JVD present. No tracheal deviation present. No thyromegaly present. Cardiovascular: Normal rate, regular rhythm and intact distal pulses. Exam reveals no gallop and no friction rub. No murmur heard. Pulmonary/Chest: Effort normal and breath sounds normal. No stridor. No respiratory distress. He has no wheezes. He has no rales. Abdominal: Soft. Bowel sounds are normal. He exhibits no distension. There is no rebound and no guarding. Mild diffuse lower abdominal tenderness   Musculoskeletal: Normal range of motion. He exhibits no edema. Lymphadenopathy:     He has no cervical adenopathy. Neurological: He is alert and oriented to person, place, and time. No cranial nerve deficit. He exhibits normal muscle tone. Coordination normal.   Skin: Skin is warm and dry. No rash noted. He is not diaphoretic. No erythema. Psychiatric: He has a normal mood and affect. His behavior is normal.   Nursing note and vitals reviewed.        MDM  Number of Diagnoses or Management Options  SBO (small bowel obstruction) [facility]):   Diagnosis management comments: DDx: Urinary retention, diverticulitis, UTI       Amount and/or Complexity of Data Reviewed  Clinical lab tests: ordered and reviewed  Tests in the radiology section of CPT®: ordered and reviewed  Review and summarize past medical records: yes  Discuss the patient with other providers: yes (General Surgery)  Independent visualization of images, tracings, or specimens: yes    Risk of Complications, Morbidity, and/or Mortality  General comments: CT abd pelvis with sbo; will admit general surgery; placed ngt in ed; Lauren Feldman MD      Patient Progress  Patient progress: stable    ED Course       Procedures    CONSULT NOTE:  2:39 AM  Lauren Feldman MD spoke with Dr. Chitra Diaz,  Specialty: General Surgery  Discussed patient's hx, disposition, and available diagnostic and imaging results. Reviewed care plans. Consultant agrees with plans as outlined. Dr. Mehdi Kenyon will admit and place NG tube. LABORATORY TESTS:  Recent Results (from the past 12 hour(s))   CBC WITH AUTOMATED DIFF    Collection Time: 07/25/17  1:21 AM   Result Value Ref Range    WBC 9.3 4.1 - 11.1 K/uL    RBC 4.60 4.10 - 5.70 M/uL    HGB 14.2 12.1 - 17.0 g/dL    HCT 42.1 36.6 - 50.3 %    MCV 91.5 80.0 - 99.0 FL    MCH 30.9 26.0 - 34.0 PG    MCHC 33.7 30.0 - 36.5 g/dL    RDW 13.4 11.5 - 14.5 %    PLATELET 755 243 - 773 K/uL    NEUTROPHILS 78 (H) 32 - 75 %    LYMPHOCYTES 17 12 - 49 %    MONOCYTES 4 (L) 5 - 13 %    EOSINOPHILS 1 0 - 7 %    BASOPHILS 0 0 - 1 %    ABS. NEUTROPHILS 7.4 1.8 - 8.0 K/UL    ABS. LYMPHOCYTES 1.6 0.8 - 3.5 K/UL    ABS. MONOCYTES 0.3 0.0 - 1.0 K/UL    ABS. EOSINOPHILS 0.1 0.0 - 0.4 K/UL    ABS.  BASOPHILS 0.0 0.0 - 0.1 K/UL   METABOLIC PANEL, COMPREHENSIVE    Collection Time: 07/25/17  1:21 AM   Result Value Ref Range    Sodium 134 (L) 136 - 145 mmol/L    Potassium 4.1 3.5 - 5.1 mmol/L    Chloride 101 97 - 108 mmol/L    CO2 26 21 - 32 mmol/L    Anion gap 7 5 - 15 mmol/L Glucose 167 (H) 65 - 100 mg/dL    BUN 15 6 - 20 MG/DL    Creatinine 1.34 (H) 0.70 - 1.30 MG/DL    BUN/Creatinine ratio 11 (L) 12 - 20      GFR est AA >60 >60 ml/min/1.73m2    GFR est non-AA 53 (L) >60 ml/min/1.73m2    Calcium 9.2 8.5 - 10.1 MG/DL    Bilirubin, total 0.5 0.2 - 1.0 MG/DL    ALT (SGPT) 29 12 - 78 U/L    AST (SGOT) 22 15 - 37 U/L    Alk. phosphatase 58 45 - 117 U/L    Protein, total 7.8 6.4 - 8.2 g/dL    Albumin 4.0 3.5 - 5.0 g/dL    Globulin 3.8 2.0 - 4.0 g/dL    A-G Ratio 1.1 1.1 - 2.2         IMAGING RESULTS:  CT Results  (Last 48 hours)               07/25/17 0212  CT ABD PELV W CONT Final result    Impression:  IMPRESSION:   1. Small bowel obstruction. There is irregularity in the cecum at the ileocecal   valve with adjacent adenopathy which is concerning for neoplasm. 2. There is a small, ill-defined area of diminished attenuation in the hepatic   dome. This lesion is incompletely characterized, but would be concerning for   metastatic disease if the finding in the right lower quadrant represents   neoplasm. 3. Cystic lesion in continuity with the left hip which could represent a   degenerative collection, possibly associated with the left hip arthroplasty. 4. Incidental findings as above. Narrative:  EXAM:  CT ABD PELV W CONT   INDICATION:   lower abd pain   COMPARISON: CT of the pelvis, 3/16/2017. .   . TECHNIQUE:    Multislice helical CT was performed from the thoracic inlet to the pubic   symphysis with intravenous contrast administration. Contiguous 5 mm axial images   were reconstructed and lung and soft tissue windows were generated. Coronal and   sagittal reformations were generated. CT dose reduction was achieved through use of a standardized protocol tailored   for this examination and automatic exposure control for dose modulation. Jamal Burns FINDINGS:   CHEST:   Chest wall/thoracic inlet: Within normal limits. Thyroid: Within normal limits.    Mediastinum/mariposa: Within normal limits. Heart/vessels: Within normal limits. Lungs/Pleura: Within normal limits. .   ABDOMEN:   Liver: There is an ill-defined area of diminished attenuation in the hepatic   dome that measures approximately 1.2 cm in diameter (#11, series 2). Gallbladder/Biliary: Within normal limits. Spleen: Within normal limits. Pancreas: Within normal limits. Adrenals: Within normal limits. Kidneys: Within normal limits. Peritoneum/Mesenteries: Adenopathy in the laith colic mesentery adjacent to the   cecum. The largest individual lymph node measures approximately 2.4 x 1.4 cm. Small amount of fluid in the pelvis   Extraperitoneum: Within normal limits. Gastrointestinal tract: Fluid-filled distended distal small bowel to the   ileocecal valve. There is irregularity in the cecum at the level of the   ileocecal valve   Vascular: Calcifications in the aorta and SMA. Calcifications at the origin of   both renal arteries. [facility] PELVIS:   Extraperitoneum: Within normal limits. Ureters: Within normal limits. Bladder: Within normal limits. Reproductive System: Within normal limits. .   MSK:    Left hip arthroplasty. There is a fluid collection in continuity with the left   hip joint space. Degenerative changes in the spine. Vacuum disc phenomenon at   L3/L4. [facility] MEDICATIONS GIVEN:  Medications   acetaminophen (TYLENOL) tablet 1,000 mg (1,000 mg Oral Given 7/25/17 0146)   sodium chloride (NS) flush 10 mL (10 mL IntraVENous Given 7/25/17 0201)   iopamidol (ISOVUE-370) 76 % injection 100 mL (100 mL IntraVENous Given 7/25/17 0201)   0.9% sodium chloride infusion (50 mL/hr IntraVENous New Bag 7/25/17 0201)       IMPRESSION:  1. SBO (small bowel obstruction) (HCC)        PLAN:  1. Admit    ADMIT NOTE:  2:41 AM  Patient is being admitted to the hospital by Dr. Katherin Mallory. The results of their tests and reasons for their admission have been discussed with them and/or available family.   They convey agreement and understanding for the need to be admitted and for their admission diagnosis. Consultation has been made with the inpatient physician specialist for hospitalization. ATTESTATION:  This note is prepared by Jose Walker, acting as Scribe for Jacqueline Orozco MD.    Jacqueline Orozco MD: The scribe's documentation has been prepared under my direction and personally reviewed by me in its entirety. I confirm that the note above accurately reflects all work, treatment, procedures, and medical decision making performed by me.

## 2017-07-25 NOTE — PROGRESS NOTES
4:45 AM Patient arrived to room 2121 via stretcher. VS stable. Assessment complete. Patient oriented to room and call bell system. Bed in lowest position. Call bell within reach. Will continue to monitor closely.     Primary Nurse Gabriela José RN and Gali Braun RN performed a dual skin assessment on this patient No impairment noted  Don score is 23

## 2017-07-26 NOTE — PROGRESS NOTES
Visited by Samanta Mathews Partner.     Eligio Rodney, Lead [facility] Paging Service  [phone]

## 2017-07-26 NOTE — PROGRESS NOTES
Problem: Falls - Risk of  Goal: *Absence of falls  Outcome: Progressing Towards Goal  Fall precautions maintained. Will continue to monitor.

## 2017-07-26 NOTE — PROGRESS NOTES
Pt was admitted for SBO. Pt was alert and oriented, upon CM room visit, with family by bedside. Pt reported that he resides alone in his one story home (no steps into main entrance). Pt reported that he is independent with ADLs, and he drives. Pt reported that he is active with PCP: seen March 2017 and he uses [facility]). Pt reported no DME. Pt reported that he has had HH in the past, but no SNF. CM will continue to follow up with pt and make referrals as deemed possible. Care Management Interventions  PCP Verified by CM: Yes  Mode of Transport at Discharge:  Other (see comment)  Transition of Care Consult (CM Consult): Discharge Planning  Discharge Durable Medical Equipment: No  Physical Therapy Consult: No  Occupational Therapy Consult: No  Speech Therapy Consult: No  Current Support Network: Own Home, Other  Confirm Follow Up Transport: Family  Plan discussed with Pt/Family/Caregiver: Yes  Discharge Location  Discharge Placement: [facility] [address], MSW   [phone]

## 2017-07-26 NOTE — PROGRESS NOTES
TRANSFER - IN REPORT:    Verbal report received from Marla Barcenas  on Yadira [facility]  being received from [employer]) for routine post - op      Report consisted of patients Situation, Background, Assessment and   Recommendations(SBAR). Information from the following report(s) SBAR, Kardex, OR Summary, Procedure Summary, Intake/Output, MAR and Recent Results was reviewed with the receiving nurse. Opportunity for questions and clarification was provided. Assessment completed upon patients arrival to unit and care assumed.

## 2017-07-26 NOTE — OP NOTES
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 PROCEDURES PERFORMED: Laparotomy, extended right colectomy   and liver biopsy. SURGEON: Shara Hanson. Christina Aguirre MD    ANESTHESIA: General endotracheal anesthesia. ESTIMATED BLOOD LOSS: Less than 100 mL. COMPLICATIONS: There were no complications. SPECIMEN    1. Right colon with terminal ileum and associated mesentery. 2. Liver biopsy cores. FINDINGS: A bulky mass in the cecum causing obstruction at the   ileocecal valve with associated bulky lymphadenopathy  in the ileocolic   mesentery, as well as a solitary subcentimeter tumor implant on the   right dome of the liver. DESCRIPTION OF OPERATION: After appropriate consent was   obtained, the patient was brought to the operating room, made   comfortable in the supine position, administered general endotracheal   anesthesia. Ortega catheter was placed and the patient already had an   NG tube. The patient was prepped and draped in standard fashion. A   midline incision was made in the abdomen, and this was extended   down through the midline fascia. Exploration of the abdominal cavity   was performed. The patient was found to have some postsurgical   changes in the lower abdomen related to his recent robotic   prostatectomy. Some adhesions were taken down between the greater   omentum and the sigmoid colon and at this time, the omentum was   able to be elevated over the bowel and the patient was found to have a   bulky mass in the cecum at the level of the ileocecal valve. This was   causing an obstruction.  At this time, the right colon was mobilized   along the white line of Toldt and the colon was mobilized up around the   hepatic flexure. The terminal ileum was adherent in the pelvis   secondary to prior surgery. It was freed up cautiously with blunt and   sharp dissection and ultimately an extended right colectomy was   performed by dividing the terminal ileum proximal to the ileocecal valve   with a BILLY 60 stapler. The transverse colon was divided proximal to   the middle colic vessels with a second firing of the BILLY 60 stapler and   when this was all completed, the mesentery was taken down with Beatrice   clamps and 2-0 silk ties. The patient was found to have some bulky   lymph nodes in the mesentery, and this was included in the specimen. Once this was complete, a side-to-side stapled anastomosis was   created between the terminal ileum and the transverse colon with an   additional firing of the BILLY 60 stapler and the remaining intestinal   defect was closed with a TA-60 stapler. The mesenteric defect was   closed with 2-0 silk running stitch. The anastomosis was palpated and   felt to be widely patent. Attention was then turned to the liver. The   falciform ligament was taken down and this afforded good visualization   of the liver up over the hepatic dome, and at the superior-most aspect   of the right hepatic dome, a subcentimeter raised tumor implant was   noted. This involved only the liver and was not adherent to the   overlying diaphragmatic peritoneum. At this time, the mass was   biopsied with a Octaviano-Cut biopsy gun. Cores were placed on Telfa and   placed in formalin and forwarded to pathology for examination. The   nodule was then fulgurated with high current electrocautery at 60 spray   setting to  completely obliterate the lesion. Hemostasis was confirmed,   and this was complete. The abdomen was further explored and no   other pathologic findings were noted. The viscera was returned to the   native location. The midline fascia was closed with a #1 PDS running   stitch x2, and the skin incision was closed with skin staples. The   wound was cleaned and dried and dressed with sterile dressings. The   patient was awakened, extubated and transferred to the recovery room   in stable condition.         MD JOSUE Celaya / JOSLYN   D:  07/25/2017   18:29   T:  07/26/2017   06:37   Job #:  [id number]

## 2017-07-26 NOTE — ROUTINE PROCESS
End of Shift Nursing Note    Bedside shift change report given to Manuel Gill RN (oncoming nurse) by Thu Espino RN (offgoing nurse). Report included the following information SBAR, Kardex, OR Summary, Procedure Summary, Intake/Output, MAR and Recent Results. Zone Phone:   [phone]    Significant changes during shift:    Pt pain constantly 9/10. Requires pain IV Dilaudid every 2 hours. NG tube had 0 ml output. Non-emergent issues for physician to address:   none     Number times ambulated in hallway past shift: 0, has extreme pain when turning in bed. Number of times OOB to chair past shift: 0      Vital Signs:    Temp: 97.9 °F (36.6 °C)     Pulse (Heart Rate): 100     BP: 121/78     Resp Rate: 14     O2 Sat (%): 92 %    Lines & Drains:     Urinary Catheter? Yes   Placement Date: 7/25/2017   Medical Necessity:yes   Central Line? No  PICC Line? No       NG tube [x] in [] removed [] not applicable   Drains [] in [] removed [x] not applicable     Skin Integrity:      Wounds: yes   Dressings Present: yes    Wound Concerns: no      GI:    Current diet:  DIET NPO    Nausea: NO  Vomiting: NO  Bowel Sounds: NO  Flatus: NO  Last Bowel Movement: several days ago,7/24/2016      Respiratory:  Supplemental O2: Yes      Device: Nasal cannula   via 3 Liters/min     Incentive Spirometer: YES    Coughing and Deep Breathing: YES  Oral Care: YES  Understanding (patient/family education): YES   Getting out of bed: NO  Head of bed elevation: YES    Patient Safety:    Falls Score: 1  Bed Alarm On? No  Sitter? No      Opportunity for questions and clarification was given to oncoming nurse. Patient bed is in Alatorre's  position, side rails are up x 2, door & observation blinds open as needed, call bell within reach and patient not in distress.     Marshal العلي

## 2017-07-26 NOTE — PROGRESS NOTES
End of Shift Nursing Note    Bedside shift change report given to Kristi Hirsch RN (oncoming nurse) by Neymar Kat (offgoing nurse). Report included the following information SBAR, Kardex, Procedure Summary, Intake/Output, MAR, Accordion and Recent Results. Significant changes during shift:  Pts pain not in control with Q2H IV dilaudid. PCA ordered, pt much more comfortable at a 7/10 but would not get OOB today. RN stressed the importance several times, pt stated he wanted to but could not get OOB. Non-emergent issues for physician to address:  OOB, amb     Number times ambulated in hallway past shift: 0      Number of times OOB to chair past shift: 0       Vital Signs:    Temp: 97.4 °F (36.3 °C)     Pulse (Heart Rate): 96     BP: 128/81     Resp Rate: 20     O2 Sat (%): 90 %    Lines & Drains:     Urinary Catheter? No       NG tube [x] in [] removed [] not applicable   Drains [] in [] removed [x] not applicable     Skin Integrity:      Wounds: no   Dressings Present: no    Wound Concerns: no      GI:    Current diet:  DIET NPO    Nausea: NO  Vomiting: NO  Bowel Sounds: YES  Flatus: YES  Last Bowel Movement: yesterday       Respiratory:  Supplemental O2: No        Incentive Spirometer: YES To 500   Coughing and Deep Breathing: YES  Oral Care: YES  Understanding (patient/family education): YES   Getting out of bed: YES  Head of bed elevation: YES    Patient Safety:    Falls Score: 1  Mobility Score: 1  Bed Alarm On? No  Sitter? No      Opportunity for questions and clarification was given to oncoming nurse. Patient bed is in low position, side rails are up x 2, door & observation blinds open as needed, call bell within reach and patient not in distress.     Charo Batista RN

## 2017-07-27 NOTE — PROGRESS NOTES
*Late Entry*    Admit Date: 2017    POD 1 Days Post-Op    Procedure:  Procedure(s):  LAPAROTOMY EXPLORATORY RIGHT COLECTOMY LIVER BIOPSY     Subjective:     Patient c/o severe abdominal pain, not controlled with prn dilaudid    Objective:     Blood pressure 120/79, pulse 98, temperature 98.1 °F (36.7 °C), resp. rate 18, height 5' 10\" (1.778 m), weight 224 lb 4.8 oz (101.7 kg), SpO2 90 %. Temp (24hrs), Av.1 °F (36.7 °C), Min:97.4 °F (36.3 °C), Max:98.5 °F (36.9 °C)      Physical Exam:  GENERAL: alert, cooperative, no distress, appears stated age, LUNG: clear to auscultation bilaterally, HEART: regular rate and rhythm, ABDOMEN: soft, non-tender. Wound c/d/i, EXTREMITIES:  extremities normal, atraumatic, no cyanosis or edema    Labs: No results found for this or any previous visit (from the past 24 hour(s)). Data Review images and reports reviewed    Assessment:     Principal Problem:    SBO (small bowel obstruction) ([address] (2017)    Active Problems:    Small bowel obstruction ([address] (2017)        Plan/Recommendations/Medical Decision Making:     Continue present treatment   S/p extended right colectomy, liver biopsy  Path pending  Poor pain control, will start PCA    Vincent Walker MD, [facility]

## 2017-07-27 NOTE — PROGRESS NOTES
CM completed chart review. Pt may discharge in next 1-2 days the pathology is pending, JANNETTE chance and goal is to AMB in [place] today. No CM needs at this time. CM will continue to monitor discharge plan.

## 2017-07-27 NOTE — PROGRESS NOTES
End of Shift Nursing Note    Bedside shift change report given to Shannon Perez RN (oncoming nurse) by Stormy Rowe RN (offgoing nurse). Report included the following information SBAR, Kardex, Procedure Summary, Intake/Output, MAR, Accordion and Recent Results. Significant changes during shift:  Pts pain not in control with Q2H IV dilaudid. PCA ordered, pt much more comfortable at a 7/10 but would not get OOB today. RN stressed the importance several times, pt stated he wanted to but could not get OOB; patient refused to get OOB overnight despite encouragement from RN, states he will get up to the chair and ambulate later this morning   Non-emergent issues for physician to address:  OOB, amb     Number times ambulated in hallway past shift: 0      Number of times OOB to chair past shift: 0       Vital Signs:    Temp: 98.1 °F (36.7 °C)     Pulse (Heart Rate): 98     BP: 120/79     Resp Rate: 18     O2 Sat (%): 90 %    Lines & Drains:     Urinary Catheter? No       NG tube [x] in [] removed [] not applicable   Drains [] in [] removed [x] not applicable     Skin Integrity:      Wounds: no   Dressings Present: no    Wound Concerns: no      GI:    Current diet:  DIET NPO    Nausea: NO  Vomiting: NO  Bowel Sounds: YES  Flatus: YES  Last Bowel Movement: yesterday       Respiratory:  Supplemental O2: No        Incentive Spirometer: YES To 500   Coughing and Deep Breathing: YES  Oral Care: YES  Understanding (patient/family education): YES   Getting out of bed: YES  Head of bed elevation: YES    Patient Safety:    Falls Score: 1  Mobility Score: 1  Bed Alarm On? No  Sitter? No      Opportunity for questions and clarification was given to oncoming nurse. Patient bed is in low position, side rails are up x 2, door & observation blinds open as needed, call bell within reach and patient not in distress.     Stormy Rowe RN

## 2017-07-27 NOTE — PROGRESS NOTES
Admit Date: 2017    POD 2 Days Post-Op    Procedure:  Procedure(s):  LAPAROTOMY EXPLORATORY RIGHT COLECTOMY LIVER BIOPSY     Subjective:     Patient has no new complaints. Objective:     Blood pressure 120/79, pulse 98, temperature 98.1 °F (36.7 °C), resp. rate 18, height 5' 10\" (1.778 m), weight 224 lb 4.8 oz (101.7 kg), SpO2 90 %. Temp (24hrs), Av.1 °F (36.7 °C), Min:97.4 °F (36.3 °C), Max:98.5 °F (36.9 °C)      Physical Exam:  GENERAL: alert, cooperative, no distress, appears stated age, LUNG: clear to auscultation bilaterally, HEART: regular rate and rhythm, ABDOMEN: soft, non-tender. Wound c/d/i, EXTREMITIES:  extremities normal, atraumatic, no cyanosis or edema    Labs: No results found for this or any previous visit (from the past 24 hour(s)). Data Review images and reports reviewed    Assessment:     Principal Problem:    SBO (small bowel obstruction) ([facility] [address] (2017)    Active Problems:    Small bowel obstruction ([address] (2017)        Plan/Recommendations/Medical Decision Making:     Continue present treatment   S/p extended right colectomy, liver biopsy  Path pending  D/c chance  Ambulate in [facility] Breanne Mansfield MD, [facility]

## 2017-07-28 PROBLEM — C18.0: Status: ACTIVE | Noted: 2017-01-01

## 2017-07-28 NOTE — PROGRESS NOTES
Interdisciplinary Rounds were completed on this patient. Rounds included nursing, clinical care leader, pharmacy, and case management. Patient was doing well without problems, sitting up in chair. Patient had the following concerns: none. Goals for the day will include: mobilize and incentive spirometer, waiting on flatus.

## 2017-07-28 NOTE — CONSULTS
Oncology Consultation        Patient: Rachel Holt MRN: [id number]  SSN: [id number]    YOB: 1950  Age: 78 y.o. Sex: male      Subjective:      Rachel Holt is a 78 y.o. male who I am seeing in consultation for a new diagnosis of metastatic colon carcinoma. He presented to the ER with worsening abdominal pain for 5 days. He was recognized to have bowel obstruction and underwent laparatomy with right hemicolectomy. The pathology shows a diagnosis of poorly differentiated carcinoma of the cecum. He is recovering from this surgery. 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 Petersen. He had a radical prostatectomy few months ago by Dr. Leyda Kendrick.  He suffers with CAD and had a CABG in the remote past.        Review of Systems:    Constitutional: negative  Eyes: negative  Ears, Nose, Mouth, Throat, and Face: negative  Respiratory: negative  Cardiovascular: negative  Gastrointestinal: positive for abdominal pain  Genitourinary:negative  Integument/Breast: negative  Hematologic/Lymphatic: negative  Musculoskeletal:negative  Neurological: negative        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) - surgery    Cancer ([address]     Prostate Cancer    Carotid artery occlusion     reports 100% occlusion on one side and 55-70% blockage in the over side (this has not changed in at least 5-6 yrs)/has doppler study on neck 2 times a yr    Chronic obstructive pulmonary disease (HCC)     Chronic pain     r/t arthritis    Congestive heart failure (HCC)     Hyperlipidemia     Hypertension     S/P CABG x 3 12/6/2012     Past Surgical History:   Procedure Laterality Date    CABG, ARTERY-VEIN, THREE  12/6/2012    catherization    COLONOSCOPY N/A 8/19/2016    COLONOSCOPY performed by Sekou Crowley MD at [facility] ENDOSCOPY    HX APPENDECTOMY      HX GI      colonoscopy 2012 - polyp    HX HEENT      tonsillectomy    HX HEENT  2015    Mouth Cancer surgery    HX ORTHOPAEDIC Left     hip replacement     HX ORTHOPAEDIC      plantar warts removed    HX ORTHOPAEDIC Right     foot    HX OTHER SURGICAL  12/3/15    bx right side of mouth/cancer removed with lymph nodes in neck removed      Family History   Problem Relation Age of Onset    Heart Disease Mother     Hypertension Mother     Diabetes Mother     Psychiatric Disorder Father     Asthma Daughter     Anesth Problems Neg Hx      Social History   Substance Use Topics    Smoking status: Former Smoker     Packs/day: 1.50     Years: 50.00     Quit date: 12/6/2012    Smokeless tobacco: Former User    Alcohol use 12.0 oz/week     20 Cans of beer per week      Prior to Admission medications    Medication Sig Start Date End Date Taking? Authorizing Provider   aspirin 81 mg chewable tablet Take 81 mg by mouth daily. Yes Historical Provider   sildenafil citrate (VIAGRA) 50 mg tablet Take  by mouth as needed. Indications: unsure of dosage   Yes Historical Provider   atorvastatin (LIPITOR) 40 mg tablet Take 40 mg by mouth daily. Yes Historical Provider   lisinopril (PRINIVIL, ZESTRIL) 5 mg tablet Take 5 mg by mouth daily. Yes Historical Provider   MULTIVITAMIN PO Take  by mouth. For men 48 +. Takes one po every other day. Yes Historical Provider   metoprolol succinate (TOPROL-XL) 25 mg XL tablet Take 25 mg by mouth daily. Yes Historical Provider   acetaminophen (TYLENOL EXTRA STRENGTH) 500 mg tablet Take 500 mg by mouth as needed for Pain. Yes Historical Provider   cephALEXin (KEFLEX) 500 mg capsule Take 1 Cap by mouth three (3) times daily.  4/6/17   Lesia Waco Stanly Fleischer, MD   oxyCODONE-acetaminophen (PERCOCET) 5-325 mg per tablet Take 1-2 Tabs by mouth every four (4) hours as needed. Max Daily Amount: 12 Tabs. 4/6/17   Alfonzo Batista MD              Allergies   Allergen Reactions    Plavix [Clopidogrel] Rash           Objective:     Vitals:    07/28/17 1105 07/28/17 1127 07/28/17 1451 07/28/17 1518   BP: (!) 149/91  152/89 (!) 158/97   Pulse: (!) 104  (!) 102 (!) 107   Resp: 18  18 18   Temp:  98.2 °F (36.8 °C) 98.1 °F (36.7 °C) 98.3 °F (36.8 °C)   SpO2: 92%  95% 94%   Weight:       Height:                Physical Exam:    GENERAL: alert, cooperative, no distress, appears stated age  EYE: negative  LYMPHATIC: Cervical, supraclavicular, and axillary nodes normal.   THROAT & NECK: normal and no erythema or exudates noted. LUNG: clear to auscultation bilaterally  HEART: regular rate and rhythm  ABDOMEN: soft, dressing on the abdominal wall  EXTREMITIES:  no edema  SKIN: Normal.  NEUROLOGIC: negative        CT Results (most recent):    Results from Hospital Encounter encounter on 07/25/17   CT ABD PELV W CONT   Narrative EXAM:  CT ABD PELV W CONT  INDICATION:   lower abd pain  COMPARISON: CT of the pelvis, 3/16/2017. .  . TECHNIQUE:   Multislice helical CT was performed from the thoracic inlet to the pubic  symphysis with intravenous contrast administration. Contiguous 5 mm axial images  were reconstructed and lung and soft tissue windows were generated. Coronal and  sagittal reformations were generated. CT dose reduction was achieved through use of a standardized protocol tailored  for this examination and automatic exposure control for dose modulation. [facility] FINDINGS:  CHEST:  Chest wall/thoracic inlet: Within normal limits. Thyroid: Within normal limits. Mediastinum/mariposa: Within normal limits. Heart/vessels: Within normal limits. Lungs/Pleura: Within normal limits. .  ABDOMEN:  Liver:  There is an ill-defined area of diminished attenuation in the hepatic  dome that measures approximately 1.2 cm in diameter (#11, series 2). Gallbladder/Biliary: Within normal limits. Spleen: Within normal limits. Pancreas: Within normal limits. Adrenals: Within normal limits. Kidneys: Within normal limits. Peritoneum/Mesenteries: Adenopathy in the laith colic mesentery adjacent to the  cecum. The largest individual lymph node measures approximately 2.4 x 1.4 cm. Small amount of fluid in the pelvis  Extraperitoneum: Within normal limits. Gastrointestinal tract: Fluid-filled distended distal small bowel to the  ileocecal valve. There is irregularity in the cecum at the level of the  ileocecal valve  Vascular: Calcifications in the aorta and SMA. Calcifications at the origin of  both renal arteries. [place] Bola[place] PELVIS:  Extraperitoneum: Within normal limits. Ureters: Within normal limits. Bladder: Within normal limits. Reproductive System: Within normal limits. .  MSK:   Left hip arthroplasty. There is a fluid collection in continuity with the left  hip joint space. Degenerative changes in the spine. Vacuum disc phenomenon at  L3/L4. .       Impression IMPRESSION:  1. Small bowel obstruction. There is irregularity in the cecum at the ileocecal  valve with adjacent adenopathy which is concerning for neoplasm. 2. There is a small, ill-defined area of diminished attenuation in the hepatic  dome. This lesion is incompletely characterized, but would be concerning for  metastatic disease if the finding in the right lower quadrant represents  neoplasm. 3. Cystic lesion in continuity with the left hip which could represent a  degenerative collection, possibly associated with the left hip arthroplasty. 4. Incidental findings as above.               Lab Results   Component Value Date/Time    WBC 3.8 07/26/2017 02:35 AM    HGB 14.2 07/26/2017 02:35 AM    HCT 43.0 07/26/2017 02:35 AM    PLATELET 948 01/11/9017 02:35 AM    MCV 92.5 07/26/2017 02:35 AM       Lab Results   Component Value Date/Time Sodium 133 07/26/2017 02:35 AM    Potassium 4.4 07/26/2017 02:35 AM    Chloride 102 07/26/2017 02:35 AM    CO2 24 07/26/2017 02:35 AM    Anion gap 7 07/26/2017 02:35 AM    Glucose 165 07/26/2017 02:35 AM    BUN 11 07/26/2017 02:35 AM    Creatinine 1.11 07/26/2017 02:35 AM    BUN/Creatinine ratio 10 07/26/2017 02:35 AM    GFR est AA >60 07/26/2017 02:35 AM    GFR est non-AA >60 07/26/2017 02:35 AM    Calcium 7.6 07/26/2017 02:35 AM    Bilirubin, total 1.0 07/26/2017 02:35 AM    AST (SGOT) 29 07/26/2017 02:35 AM    Alk. phosphatase 42 07/26/2017 02:35 AM    Protein, total 6.4 07/26/2017 02:35 AM    Albumin 3.0 07/26/2017 02:35 AM    Globulin 3.4 07/26/2017 02:35 AM    A-G Ratio 0.9 07/26/2017 02:35 AM    ALT (SGPT) 35 07/26/2017 02:35 AM             Assessment:     1. 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 We will treat him with 3 months of adjuvant mFOLFOX  Redo staging scans  If the disease has not progressed, we shall have the liver lesion resected  Then complete 3 more months of systemic chemotherapy    I spent 65 minute with the patient in a face-to-face encounter. I explained him the stage of the disease, pathophysiology of the disease and the treatment approaches. I answered all his questions. More than 50% of the time was utilized in education, counseling and co-ordination of care. Plan:        1. Will see him in office   2. He will need systemic chemotherapy. 3. If the disease behaves, he could have a liver resection and hopefully cured. 4. Discuss case in tumor board.          Signed By: Patricia Lamb MD     July 28, 2017

## 2017-07-28 NOTE — PROGRESS NOTES
End of Shift Nursing Note    Bedside shift change report given to Ky Pino (oncoming nurse) by Sundar Sánchez (offgoing nurse). Report included the following information SBAR, Kardex and Recent Results. Zone Phone:       Significant changes during shift:   Voiding, up and ambulated the hallway x 3, still not passing gas. Non-emergent issues for physician to address:   None     Number times ambulated in hallway past shift: 2      Number of times OOB to chair past shift: 2    POD #:      Vital Signs:    Temp: 98.6 °F (37 °C)     Pulse (Heart Rate): (!) 109     BP: 144/75     Resp Rate: 18     O2 Sat (%): 92 %    Lines & Drains:     Urinary Catheter? No   Placement Date:    Medical Necessity:   Central Line? No   Placement Date:    Medical Necessity:   PICC Line? No   Placement Date:    Medical Necessity:     NG tube [] in [] removed [] not applicable   Drains [] in [] removed [] not applicable     Skin Integrity:      Wounds: no   Dressings Present: no    Wound Concerns: no      GI:    Current diet:  DIET NPO    Nausea: NO  Vomiting: NO  Bowel Sounds: YES  Flatus: NO  Last Bowel Movement: several days ago   Appearance: Unobserved    Respiratory:  Supplemental O2: No      Device:    via  Liters/min     Incentive Spirometer: YES  Volume:   Coughing and Deep Breathing: YES  Oral Care: YES  Understanding (patient/family education): YES   Getting out of bed: YES  Head of bed elevation: YES    Patient Safety:    Falls Score: 1  Mobility Score: 5  Bed Alarm On? No  Sitter? No      Opportunity for questions and clarification was given to oncoming nurse. Patient bed is in low position, side rails are up x 2, door & observation blinds open as needed, call bell within reach and patient not in distress.     Jodi Brown

## 2017-07-28 NOTE — PROGRESS NOTES
End of Shift Nursing Note    Bedside shift change report given to montez randall (oncoming nurse) by Timo Beckett (offgoing nurse). Report included the following information SBAR and Kardex. Zone Phone:   [phone]    Significant changes during shift:    No flatus, no bm, voided x1 after chance removal, significant pain still but using PCA, NG putting out fair amount   Non-emergent issues for physician to address:        Number times ambulated in hallway past shift: 5      Number of times OOB to chair past shift: 4    POD #: 2     Vital Signs:    Temp: 98.8 °F (37.1 °C)     Pulse (Heart Rate): 89     BP: 143/87     Resp Rate: 18     O2 Sat (%): 96 %    Lines & Drains:     Urinary Catheter? No     NG tube [x] in [] removed [] not applicable   Drains [] in [] removed [] not applicable     Skin Integrity:      Wounds: yes   Dressings Present: yes    Wound Concerns: no      GI:    Current diet:  DIET NPO    Nausea: NO  Vomiting: NO  Bowel Sounds: YES  Flatus: NO  Last Bowel Movement: several days ago   Appearance:     Respiratory:  Incentive Spirometer: YES  Volume: 750  Coughing and Deep Breathing: YES  Oral Care: YES  Understanding (patient/family education): YES   Getting out of bed: YES  Head of bed elevation: YES    Patient Safety:    Falls Score: 2  Mobility Score: 1  Bed Alarm On? Not applicable  Sitter? Not applicable      Opportunity for questions and clarification was given to oncoming nurse. Patient bed is in low position, side rails are up x 2, door & observation blinds open as needed, call bell within reach and patient not in distress.     Van Hung RN

## 2017-07-28 NOTE — PROGRESS NOTES
End of Shift Nursing Note    Bedside shift change report given to Frank Lee (oncoming nurse) by Ky Pino (offgoing nurse). Report included the following information SBAR, Kardex, Intake/Output, MAR and Recent Results. Zone Phone:   [phone]    Significant changes during shift:    0   Non-emergent issues for physician to address:   0     Number times ambulated in hallway past shift: 5      Number of times OOB to chair past shift: 5    POD #: 3     Vital Signs:    Temp: 98.3 °F (36.8 °C)     Pulse (Heart Rate): (!) 107     BP: (!) 158/97     Resp Rate: 18     O2 Sat (%): 94 %    Lines & Drains:     Urinary Catheter? No   Placement Date: 0   Medical Necessity: 0  Central Line? No   Placement Date: 0   Medical Necessity: 0  PICC Line? No   Placement Date: 0   Medical Necessity: 0    NG tube [x] in [] removed [] not applicable   Drains [] in [] removed [x] not applicable     Skin Integrity:      Wounds: no   Dressings Present: no    Wound Concerns: no      GI:    Current diet:  DIET NPO    Nausea: YES  Vomiting: NO  Bowel Sounds: YES  Flatus: NO  Last Bowel Movement: several days ago   Appearance: 0    Respiratory:  Supplemental O2: No      Device: 0   via 0 Liters/min     Incentive Spirometer: YES  Volume: 0  Coughing and Deep Breathing: YES  Oral Care: YES  Understanding (patient/family education): YES   Getting out of bed: YES  Head of bed elevation: YES    Patient Safety:    Falls Score: 1  Mobility Score: 1  Bed Alarm On? No  Sitter? No      Opportunity for questions and clarification was given to oncoming nurse. Patient bed is in low position, side rails are up x 2, door & observation blinds open as needed, call bell within reach and patient not in distress.     Jeaneen Seip, RN

## 2017-07-28 NOTE — PROGRESS NOTES
Admit Date: 2017    POD 2 Days Post-Op    Procedure:  Procedure(s):  LAPAROTOMY EXPLORATORY RIGHT COLECTOMY LIVER BIOPSY     Subjective:     Patient has no new complaints. Objective:     Blood pressure (!) 149/91, pulse (!) 104, temperature 98.2 °F (36.8 °C), resp. rate 18, height 5' 10\" (1.778 m), weight 224 lb 4.8 oz (101.7 kg), SpO2 92 %. Temp (24hrs), Av.4 °F (36.9 °C), Min:97.9 °F (36.6 °C), Max:98.8 °F (37.1 °C)      Physical Exam:  GENERAL: alert, cooperative, no distress, appears stated age, LUNG: clear to auscultation bilaterally, HEART: regular rate and rhythm, ABDOMEN: soft, non-tender. Wound c/d/i, EXTREMITIES:  extremities normal, atraumatic, no cyanosis or edema    Labs: No results found for this or any previous visit (from the past 24 hour(s)). Data Review images and reports reviewed    Assessment:     Principal Problem:    SBO (small bowel obstruction) ([address] (2017)    Active Problems:    Small bowel obstruction ([address] (2017)        Plan/Recommendations/Medical Decision Making:     Continue present treatment   S/p extended right colectomy, liver biopsy  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 Ambulate in [place]  Oncology referral  NGT to remain until some return of bowel funciton    Vincent Angela MD, [facility]

## 2017-07-29 NOTE — PROGRESS NOTES
Bedside and Verbal shift change report given to Cherise (oncoming nurse) by Sun Miller (offgoing nurse). Report included the following information SBAR, Kardex, MAR and Recent Results.

## 2017-07-29 NOTE — PROGRESS NOTES
End of Shift Nursing Note    Bedside shift change report given to Ryland Dupree RN (oncoming nurse) by Carmen Diamond RN (offgoing nurse). Report included the following information SBAR, Kardex, Intake/Output and MAR. Zone Phone:       Significant changes during shift:    NGT re-taped,  Pt trying not to use PCA to avoid constipation. IV Ofirmev holding patient's pain well. Non-emergent issues for physician to address:   none     Number times ambulated in hallway past shift: 1     Number of times OOB to chair past shift: 1, all afternoon    POD #: 3     Vital Signs:    Temp: 98.9 °F (37.2 °C)     Pulse (Heart Rate): 83     BP: 162/87     Resp Rate: 12     O2 Sat (%): 93 %    Lines & Drains:     Urinary Catheter? No   Placement Date:    Medical Necessity:        NG tube [x] in [] removed [] not applicable   Drains [] in [] removed [x] not applicable     Skin Integrity:      Wounds: Yes, Redness noted around wound area   Dressings Present: Yes   Wound Concerns: no      GI:    Current diet:  DIET NPO    Nausea: NO  Vomiting: NO  Bowel Sounds: YES  Flatus: NO  Last Bowel Movement:    Appearance:     Respiratory:  Supplemental O2: No      Device:    via  Liters/min     Incentive Spirometer: YES Volume: 1500  Coughing and Deep Breathing: Yes  Oral Care:   Understanding (patient/family education): YES   Getting out of bed: yes  Head of bed elevation: YES    Patient Safety:    Falls Score: 2  Mobility Score:   Bed Alarm On? No  Sitter? No      Opportunity for questions and clarification was given to oncoming nurse. Patient bed is in low position, side rails are up x 2, door & observation blinds open as needed, call bell within reach and patient not in distress.     Nirmal Leone, LEATHA

## 2017-07-29 NOTE — PROGRESS NOTES
End of Shift Nursing Note    Bedside shift change report given to Ba Sevilla (oncoming nurse) by Houston Almeida (offgoing nurse). Report included the following information SBAR, Kardex, Intake/Output and MAR. Zone Phone:       Significant changes during shift:    Pt frustrated with lack of flatus and BM   Non-emergent issues for physician to address:        Number times ambulated in hallway past shift: 2      Number of times OOB to chair past shift: 2    POD #: 3     Vital Signs:    Temp: 98.1 °F (36.7 °C)     Pulse (Heart Rate): 89     BP: 161/77     Resp Rate: 12     O2 Sat (%): 93 %    Lines & Drains:     Urinary Catheter? No   Placement Date:    Medical Necessity:   Central Line? No   Placement Date:    Medical Necessity:   PICC Line? No   Placement Date:    Medical Necessity:     NG tube [] in [] removed [] not applicable   Drains [] in [] removed [] not applicable     Skin Integrity:      Wounds: Yes, Redness noted around wound area   Dressings Present: Yes   Wound Concerns: no      GI:    Current diet:  DIET NPO    Nausea: NO  Vomiting: NO  Bowel Sounds: YES  Flatus: NO  Last Bowel Movement:    Appearance:     Respiratory:  Supplemental O2: No      Device:    via  Liters/min     Incentive Spirometer:  Volume:   Coughing and Deep Breathing: Yes  Oral Care:   Understanding (patient/family education): YES   Getting out of bed: yes  Head of bed elevation: YES    Patient Safety:    Falls Score: 2  Mobility Score:   Bed Alarm On? No  Sitter? No      Opportunity for questions and clarification was given to oncoming nurse. Patient bed is in low position, side rails are up x 2, door & observation blinds open as needed, call bell within reach and patient not in distress.     David Thayer RN

## 2017-07-29 NOTE — PROGRESS NOTES
Admit Date: 2017    POD 3 Days Post-Op    Procedure:  Procedure(s):  LAPAROTOMY EXPLORATORY RIGHT COLECTOMY LIVER BIOPSY     Subjective:     Patient has no new complaints. Still no flatus or BM. 1250 NG o/p past 24 hours    Objective:     Blood pressure 161/77, pulse 89, temperature 98.1 °F (36.7 °C), resp. rate 12, height 5' 10\" (1.778 m), weight 224 lb 4.8 oz (101.7 kg), SpO2 93 %. Temp (24hrs), Av.1 °F (36.7 °C), Min:97.7 °F (36.5 °C), Max:98.4 °F (36.9 °C)      Physical Exam:  GENERAL: alert, cooperative, no distress, appears stated age, LUNG: clear to auscultation bilaterally, HEART: regular rate and rhythm, ABDOMEN: soft, non-tender. Wound c/d/i, EXTREMITIES:  extremities normal, atraumatic, no cyanosis or edema    Labs:   Recent Results (from the past 24 hour(s))   CBC WITH AUTOMATED DIFF    Collection Time: 17  2:43 AM   Result Value Ref Range    WBC 4.9 4.1 - 11.1 K/uL    RBC 4.04 (L) 4.10 - 5.70 M/uL    HGB 12.2 12.1 - 17.0 g/dL    HCT 37.4 36.6 - 50.3 %    MCV 92.6 80.0 - 99.0 FL    MCH 30.2 26.0 - 34.0 PG    MCHC 32.6 30.0 - 36.5 g/dL    RDW 13.6 11.5 - 14.5 %    PLATELET 463 329 - 120 K/uL    NEUTROPHILS 62 32 - 75 %    LYMPHOCYTES 25 12 - 49 %    MONOCYTES 5 5 - 13 %    EOSINOPHILS 8 (H) 0 - 7 %    BASOPHILS 0 0 - 1 %    ABS. NEUTROPHILS 3.0 1.8 - 8.0 K/UL    ABS. LYMPHOCYTES 1.2 0.8 - 3.5 K/UL    ABS. MONOCYTES 0.3 0.0 - 1.0 K/UL    ABS. EOSINOPHILS 0.4 0.0 - 0.4 K/UL    ABS.  BASOPHILS 0.0 0.0 - 0.1 K/UL   METABOLIC PANEL, BASIC    Collection Time: 17  2:43 AM   Result Value Ref Range    Sodium 136 136 - 145 mmol/L    Potassium 3.7 3.5 - 5.1 mmol/L    Chloride 102 97 - 108 mmol/L    CO2 27 21 - 32 mmol/L    Anion gap 7 5 - 15 mmol/L    Glucose 135 (H) 65 - 100 mg/dL    BUN 9 6 - 20 MG/DL    Creatinine 0.93 0.70 - 1.30 MG/DL    BUN/Creatinine ratio 10 (L) 12 - 20      GFR est AA >60 >60 ml/min/1.73m2    GFR est non-AA >60 >60 ml/min/1.73m2    Calcium 8.2 (L) 8.5 - 10.1 MG/DL Data Review images and reports reviewed    Assessment:     Principal Problem:    SBO (small bowel obstruction) ([facility] (7/25/2017)    Active Problems:    Small bowel obstruction ([facility] (7/25/2017)      Aden[facility] of [facility] stage, IVb ([facility] (7/28/2017)        Plan/Recommendations/Medical Decision Making:     Continue present treatment   S/p extended right colectomy, liver biopsy  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 Ambulate in [place]  Oncology referral  NGT to remain until some return of bowel charla Ervin MD, [facility]

## 2017-07-29 NOTE — PROGRESS NOTES
Patient's /95, patient on BP medications at home that are not ordered now. Spoke to Dr. Shantanu Olivares. Orders received. Also informed Dr. Shantanu Olivares of redness on patients abdomen around incision site. MD stated he was aware. Will continue to monitor patient.

## 2017-07-29 NOTE — PROGRESS NOTES
Oncology Consultation        Patient: Sirisha Jasso MRN: [id number]  SSN: [id number]    YOB: 1950  Age: 78 y.o. Sex: male      Subjective:      Sirisha Jasso is a 78 y.o. male with new diagnosis of metastatic colon carcinoma. He presented to the ER with worsening abdominal pain for 5 days. He was recognized to have bowel obstruction and underwent laparatomy with right hemicolectomy. The pathology shows a diagnosis of poorly differentiated carcinoma of the cecum. He is recovering from this surgery. 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 Petersen. He had a radical prostatectomy few months ago by Dr. Emy Mccormick. He suffers with CAD and had a CABG in the remote past.    INTERVAL HISTORY: Up in chair at my visit. Still with NG tube. Thirsty. Pain controlled. No SOB. Review of Systems:    Constitutional: negative  Eyes: negative  Ears, Nose, Mouth, Throat, and Face: negative  Respiratory: negative  Cardiovascular: negative  Gastrointestinal: positive for abdominal pain  Genitourinary:negative  Integument/Breast: negative  Hematologic/Lymphatic: negative  Musculoskeletal:negative  Neurological: negative     Prior to Admission medications    Medication Sig Start Date End Date Taking? Authorizing Provider   aspirin 81 mg chewable tablet Take 81 mg by mouth daily. Yes Historical Provider   sildenafil citrate (VIAGRA) 50 mg tablet Take  by mouth as needed. Indications: unsure of dosage   Yes Historical Provider   atorvastatin (LIPITOR) 40 mg tablet Take 40 mg by mouth daily. Yes Historical Provider   lisinopril (PRINIVIL, ZESTRIL) 5 mg tablet Take 5 mg by mouth daily. Yes Historical Provider   MULTIVITAMIN PO Take  by mouth. For men 48 +. Takes one po every other day. Yes Historical Provider   metoprolol succinate (TOPROL-XL) 25 mg XL tablet Take 25 mg by mouth daily.    Yes Historical Provider   acetaminophen (TYLENOL EXTRA STRENGTH) 500 mg tablet Take 500 mg by mouth as needed for Pain. Yes Historical Provider   cephALEXin (KEFLEX) 500 mg capsule Take 1 Cap by mouth three (3) times daily. 4/6/17   Beatrice Stewart MD   oxyCODONE-acetaminophen (PERCOCET) 5-325 mg per tablet Take 1-2 Tabs by mouth every four (4) hours as needed. Max Daily Amount: 12 Tabs. 4/6/17   Beatrice Stewart MD              Allergies   Allergen Reactions    Plavix [Clopidogrel] Rash           Objective:     Vitals:    07/28/17 2226 07/29/17 0014 07/29/17 0257 07/29/17 0719   BP: (!) 142/94 131/83 154/82 138/74   Pulse: (!) 105 76 75 88   Resp:  20 20 12   Temp:  98.4 °F (36.9 °C) 98 °F (36.7 °C) 97.7 °F (36.5 °C)   SpO2:  97% 93% 94%   Weight:       Height:                Physical Exam:    GENERAL: alert, cooperative, no distress, appears stated age  EYE: negative  LYMPHATIC: Cervical, supraclavicular, and axillary nodes normal.   THROAT & NECK: NG tube in place, otherwise, normal and no erythema or exudates noted. LUNG: clear to auscultation bilaterally  HEART: regular rate and rhythm  ABDOMEN: soft, dressing on the abdominal wall  EXTREMITIES:  no edema  SKIN: Normal.  NEUROLOGIC: negative        CT Results (most recent):    Results from Hospital Encounter encounter on 07/25/17   CT ABD PELV W CONT   Narrative EXAM:  CT ABD PELV W CONT  INDICATION:   lower abd pain  COMPARISON: CT of the pelvis, 3/16/2017. .  . TECHNIQUE:   Multislice helical CT was performed from the thoracic inlet to the pubic  symphysis with intravenous contrast administration. Contiguous 5 mm axial images  were reconstructed and lung and soft tissue windows were generated. Coronal and  sagittal reformations were generated. CT dose reduction was achieved through use of a standardized protocol tailored  for this examination and automatic exposure control for dose modulation. Rambo May FINDINGS:  CHEST:  Chest wall/thoracic inlet: Within normal limits. Thyroid: Within normal limits. Mediastinum/mariposa:  Within normal limits. Heart/vessels: Within normal limits. Lungs/Pleura: Within normal limits. .  ABDOMEN:  Liver: There is an ill-defined area of diminished attenuation in the hepatic  dome that measures approximately 1.2 cm in diameter (#11, series 2). Gallbladder/Biliary: Within normal limits. Spleen: Within normal limits. Pancreas: Within normal limits. Adrenals: Within normal limits. Kidneys: Within normal limits. Peritoneum/Mesenteries: Adenopathy in the laith colic mesentery adjacent to the  cecum. The largest individual lymph node measures approximately 2.4 x 1.4 cm. Small amount of fluid in the pelvis  Extraperitoneum: Within normal limits. Gastrointestinal tract: Fluid-filled distended distal small bowel to the  ileocecal valve. There is irregularity in the cecum at the level of the  ileocecal valve  Vascular: Calcifications in the aorta and SMA. Calcifications at the origin of  both renal arteries. [place] PELVIS:  Extraperitoneum: Within normal limits. Ureters: Within normal limits. Bladder: Within normal limits. Reproductive System: Within normal limits. .  MSK:   Left hip arthroplasty. There is a fluid collection in continuity with the left  hip joint space. Degenerative changes in the spine. Vacuum disc phenomenon at  L3/L4. .       Impression IMPRESSION:  1. Small bowel obstruction. There is irregularity in the cecum at the ileocecal  valve with adjacent adenopathy which is concerning for neoplasm. 2. There is a small, ill-defined area of diminished attenuation in the hepatic  dome. This lesion is incompletely characterized, but would be concerning for  metastatic disease if the finding in the right lower quadrant represents  neoplasm. 3. Cystic lesion in continuity with the left hip which could represent a  degenerative collection, possibly associated with the left hip arthroplasty. 4. Incidental findings as above.               Lab Results   Component Value Date/Time    WBC 4.9 07/29/2017 02:43 AM HGB 12.2 07/29/2017 02:43 AM    HCT 37.4 07/29/2017 02:43 AM    PLATELET 880 13/90/7090 02:43 AM    MCV 92.6 07/29/2017 02:43 AM       Lab Results   Component Value Date/Time    Sodium 136 07/29/2017 02:43 AM    Potassium 3.7 07/29/2017 02:43 AM    Chloride 102 07/29/2017 02:43 AM    CO2 27 07/29/2017 02:43 AM    Anion gap 7 07/29/2017 02:43 AM    Glucose 135 07/29/2017 02:43 AM    BUN 9 07/29/2017 02:43 AM    Creatinine 0.93 07/29/2017 02:43 AM    BUN/Creatinine ratio 10 07/29/2017 02:43 AM    GFR est AA >60 07/29/2017 02:43 AM    GFR est non-AA >60 07/29/2017 02:43 AM    Calcium 8.2 07/29/2017 02:43 AM    Bilirubin, total 1.0 07/26/2017 02:35 AM    AST (SGOT) 29 07/26/2017 02:35 AM    Alk. phosphatase 42 07/26/2017 02:35 AM    Protein, total 6.4 07/26/2017 02:35 AM    Albumin 3.0 07/26/2017 02:35 AM    Globulin 3.4 07/26/2017 02:35 AM    A-G Ratio 0.9 07/26/2017 02:35 AM    ALT (SGPT) 35 07/26/2017 02:35 AM             Assessment:     1. 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 We will treat him with 3 months of adjuvant mFOLFOX  Redo staging scans  If the disease has not progressed, we shall have the liver lesion resected  Then complete 3 more months of systemic chemotherapy    We reviewed the plan today. The risks, benefits, and rationale of chemotherapy. We discussed the chemotherapy regimen, it's logistics, and potential toxicities in detail. Potential side effects include, but are not limited to, nausea, vomiting, diarrhea, taste changes, myelosuppression, infection, fatigue, allergic reactions, and rarely, death.  The patient asked several well thought out questions which I answered to the best of my ability and to his apparent satisfaction. I also explained that once he is better and out of the hospital he will hear this information again before he starts treatment. Plan:        1. Dr. Jae Hawley will see him in office   2. He will need systemic chemotherapy. 3. If the disease behaves, he could have a liver resection and hopefully cured. 4. Discuss case in tumor board.        Signed By: Arley Denver, MD     July 29, 2017

## 2017-07-29 NOTE — PROGRESS NOTES
End of Shift Nursing Note    Bedside shift change report given to LEATHA Mckenzie (oncoming nurse) by Ray Florence (offgoing nurse). Report included the following information SBAR, Kardex, Intake/Output and MAR. Zone Phone:       Significant changes during shift:       Non-emergent issues for physician to address:        Number times ambulated in hallway past shift: 2      Number of times OOB to chair past shift: 2    POD #: 3     Vital Signs:    Temp: 98 °F (36.7 °C)     Pulse (Heart Rate): 75     BP: 154/82     Resp Rate: 20     O2 Sat (%): 93 %    Lines & Drains:     Urinary Catheter? No   Placement Date:    Medical Necessity:   Central Line? No   Placement Date:    Medical Necessity:   PICC Line? No   Placement Date:    Medical Necessity:     NG tube [] in [] removed [] not applicable   Drains [] in [] removed [] not applicable     Skin Integrity:      Wounds: Yes, Redness noted around wound area   Dressings Present: Yes   Wound Concerns: no      GI:    Current diet:  DIET NPO    Nausea: NO  Vomiting: NO  Bowel Sounds: YES  Flatus: NO  Last Bowel Movement:    Appearance:     Respiratory:  Supplemental O2: No      Device:    via  Liters/min     Incentive Spirometer:  Volume:   Coughing and Deep Breathing: Yes  Oral Care:   Understanding (patient/family education): YES   Getting out of bed: yes  Head of bed elevation: YES    Patient Safety:    Falls Score: 2  Mobility Score:   Bed Alarm On? No  Sitter? No      Opportunity for questions and clarification was given to oncoming nurse. Patient bed is in low position, side rails are up x 2, door & observation blinds open as needed, call bell within reach and patient not in distress.     Mira Georges RN

## 2017-07-30 NOTE — PROGRESS NOTES
Admit Date: 2017    POD 4 Days Post-Op    Procedure:  Procedure(s):  LAPAROTOMY EXPLORATORY RIGHT COLECTOMY LIVER BIOPSY     Subjective:     Patient has no new complaints. Now with flatus. Objective:     Blood pressure (!) 167/95, pulse 80, temperature 98 °F (36.7 °C), resp. rate 20, height 5' 10\" (1.778 m), weight 224 lb 4.8 oz (101.7 kg), SpO2 95 %. Temp (24hrs), Av.4 °F (36.9 °C), Min:98 °F (36.7 °C), Max:98.9 °F (37.2 °C)      Physical Exam:  GENERAL: alert, cooperative, no distress, appears stated age, LUNG: clear to auscultation bilaterally, HEART: regular rate and rhythm, ABDOMEN: soft, non-tender. Wound c/d/i, EXTREMITIES:  extremities normal, atraumatic, no cyanosis or edema    Labs:   No results found for this or any previous visit (from the past 24 hour(s)). 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 Ambulate in [place]  Oncology referral  D/c ngt, clear liquid diet    Vincent Sin MD, [facility]

## 2017-07-30 NOTE — PROGRESS NOTES
Pt reported feeling worse today than yesterday, Stated \"I am just not getting better\",  His NG output is brown with some maroon color, it tested heme + with gastrocult on floor test.

## 2017-07-30 NOTE — PROGRESS NOTES
End of Shift Nursing Note    Bedside shift change report given to [address] [place] [address] (oncoming nurse) by Jan Kelsey (offgoing nurse). Report included the following information SBAR, Kardex, OR Summary, Procedure Summary, Intake/Output, MAR, Accordion and Recent Results. Significant changes during shift:    Pt + flatus, césar cl liq, up to chair, amb freq. Non-emergent issues for physician to address:   none     Number times ambulated in hallway past shift: 5      Number of times OOB to chair past shift: 1, majority of day    POD #: 4     Vital Signs:    Temp: 98.2 °F (36.8 °C)     Pulse (Heart Rate): 74     BP: 143/80     Resp Rate: 20     O2 Sat (%): 93 %    Lines & Drains:     Urinary Catheter? No       NG tube [] in [] removed [x] not applicable   Drains [] in [] removed [x] not applicable     Skin Integrity:      Wounds: yes   Dressings Present: yes    Wound Concerns: no      GI:    Current diet:  DIET CLEAR LIQUID    Nausea: NO  Vomiting: NO  Bowel Sounds: YES  Flatus: YES  Last Bowel Movement: today      Respiratory:  Supplemental O2: No        Incentive Spirometer: YES  Volume: 1500   Coughing and Deep Breathing: YES  Oral Care: YES  Understanding (patient/family education): YES   Getting out of bed: YES  Head of bed elevation: YES    Patient Safety:    Falls Score: 1  Mobility Score: 1  Bed Alarm On? No  Sitter? No      Opportunity for questions and clarification was given to oncoming nurse. Patient bed is in low position, side rails are up x 2, door & observation blinds open as needed, call bell within reach and patient not in distress.     Sharee Gaitan RN

## 2017-07-30 NOTE — PROGRESS NOTES
End of Shift Nursing Note    Bedside shift change report given to [address] (oncoming nurse) by Tristian Barron RN (offgoing nurse). Report included the following information SBAR, Kardex, OR Summary, Intake/Output, MAR, Recent Results and Cardiac Rhythm SR. Zone Phone:   [phone]    Significant changes during shift:    none   Non-emergent issues for physician to address:  gastroccult was heme positive     Number times ambulated in hallway past shift: 1      Number of times OOB to chair past shift: pt spent night in recliner    POD #: 4     Vital Signs:    Temp: 98.4 °F (36.9 °C)     Pulse (Heart Rate): 82     BP: 163/86     Resp Rate: 18     O2 Sat (%): 95 %    Lines & Drains:     Urinary Catheter? No      NG tube [x] in [] removed [] not applicable   Drains [] in [] removed [x] not applicable     Skin Integrity:      Wounds: yes   Dressings Present: no    Wound Concerns: no      GI:    Current diet:  DIET NPO    Nausea: NO  Vomiting: NO  Bowel Sounds: YES  Flatus: NO  Last Bowel Movement: preop   Appearance:     Respiratory:  Supplemental O2: No        Incentive Spirometer: YES  Volume:   Coughing and Deep Breathing: YES  Oral Care: YES  Understanding (patient/family education): YES   Getting out of bed: YES  Head of bed elevation: YES    Patient Safety:    Falls Score: 2  Mobility Score: 1  Bed Alarm On? No  Sitter? No      Opportunity for questions and clarification was given to oncoming nurse. Patient bed is in low position, side rails are up x 2, door & observation blinds open as needed, call bell within reach and patient not in distress.     Reid Dudley RN3

## 2017-07-31 NOTE — CARDIO/PULMONARY
C/P rehab note- chart reviewed. Adm with abd pain,SBO,Adenocarcinoma of cecum stage IVb. HX includes CABG x 3,CAD,Prostate cancer,HTN. HX lists CHF but no previous teaching noted and current EF is 50%. Former smoker. Will continue to follow for teaching if indicated.

## 2017-07-31 NOTE — PROGRESS NOTES
Interdisciplinary Rounds were completed on this patient. Rounds included nursing, clinical care leader, pharmacy, and case management. Patient was doing well without problems. Patient had the following concerns: none. Goals for the day will include: continue RX as is.

## 2017-07-31 NOTE — PROGRESS NOTES
Admit Date: 2017    POD 6 Days Post-Op    Procedure:  Procedure(s):  LAPAROTOMY EXPLORATORY RIGHT COLECTOMY LIVER BIOPSY     Subjective:     Patient has no new complaints. césar clears, bm getting formed    Objective:     Blood pressure (!) 161/97, pulse 77, temperature 98.1 °F (36.7 °C), resp. rate 18, height 5' 10\" (1.778 m), weight 224 lb 4.8 oz (101.7 kg), SpO2 98 %. Temp (24hrs), Av °F (36.7 °C), Min:97.7 °F (36.5 °C), Max:98.2 °F (36.8 °C)      Physical Exam:  GENERAL: alert, cooperative, no distress, appears stated age, LUNG: nl effort, HEART: regular rate and rhythm, ABDOMEN: soft, non-tender. Bowel sounds normal. No masses,  no organomegaly, EXTREMITIES:  extremities normal, atraumatic, no cyanosis or edema    Labs: No results found for this or any previous visit (from the past 24 hour(s)). Data Review reviewed  Consultants documentation and I & O    Assessment:     Principal Problem:    SBO (small bowel obstruction) ([address] (2017)    Active Problems:    Small bowel obstruction ([address] (2017)      Adenocarcinoma of cecum stage, IVb ([facility] (2017)        Plan/Recommendations/Medical Decision Making:     Continue present treatment  Diet  full liquid    Everett Toro MD, [facility]

## 2017-07-31 NOTE — PROGRESS NOTES
Nutrition Services      Nutrition Screen:  Wt Readings from Last 10 Encounters:   07/26/17 101.7 kg (224 lb 4.8 oz)   04/13/17 93.4 kg (205 lb 14.6 oz)   04/05/17 92.9 kg (204 lb 12.9 oz)   03/23/17 96.9 kg (213 lb 10 oz)   03/14/17 96.6 kg (213 lb)   08/19/16 93 kg (205 lb)   12/19/15 88.7 kg (195 lb 8.8 oz)   12/10/15 93 kg (205 lb)   11/27/15 95.3 kg (210 lb)   02/25/13 93 kg (205 lb)     Body mass index is 32.18 kg/(m^2). Supplements:                        _____ ordered ___x___  Declined for today. __ __  Pt is nutritionally stable at this time, will rescreen in 7 days. _x __    Pt is at nutritional risk and will be rescreened in 2-4 days. __ __  Pt is at moderate or high nutritional risk, will refer to RD for assessment.        Wade Jacobo  Dietetic Technician, Registered

## 2017-08-01 NOTE — PROGRESS NOTES
Patient discharged via wheelchair with staff accompanied. Patient had all belongings, paperwork, and prescriptions. Patient signed and verbalized understanding regarding discharge paperwork.  No distress noted

## 2017-08-01 NOTE — DISCHARGE INSTRUCTIONS
Open Bowel Resection: What to Expect at [address] are likely to have pain that comes and goes for the next few days after bowel surgery. You may have bowel cramps, and your cut (incision) may hurt. You may also feel like you have the flu. You may have a low fever and feel tired and nauseated. This is common. You should feel better after a week and will probably be back to normal in 2 to 3 weeks. This care sheet gives you a general idea about how long it will take for you to recover. But each person recovers at a different pace. Follow the steps below to get better as quickly as possible. How can you care for yourself at home? Activity  · Rest when you feel tired. Getting enough sleep will help you recover. · Try to walk each day. Start by walking a little more than you did the day before. Bit by bit, increase the amount you walk. Walking boosts blood flow and helps prevent pneumonia and constipation. · Avoid strenuous activities, such as biking, jogging, weight lifting, or aerobic exercise, until your doctor says it is okay. · Ask your doctor when you can drive again. · You will probably need to take 3 to 4 weeks off from work. It depends on the type of work you do and how you feel. You may need to take off 4 to 6 weeks if you lift heavy objects in your job. · You may shower 24 to 48 hours after surgery, if your doctor says it is okay. Pat the cut (incision) dry. Do not take a bath for the first 2 weeks, or until your doctor tells you it is okay. · Ask your doctor when it is okay for you to have sex. Diet  · You may not have much appetite after the surgery. But try to eat a healthy diet. Your doctor will tell you about any foods you should not eat. · Eat a low-fiber diet for several weeks after surgery. Eat many small meals throughout the day. Add high-fiber foods a little at a time. · Eat yogurt. It puts good bacteria into your colon and helps prevent diarrhea.   · Try to avoid nuts, seeds, and corn for a while. They may be hard to digest.  · You may need to take vitamins that contain sodium and potassium. Ask your doctor. · Drink plenty of fluids to avoid becoming dehydrated. Medicines  · Your doctor will tell you if and when you can restart your medicines. He or she will also give you instructions about taking any new medicines. · If you take blood thinners, such as warfarin (Coumadin), clopidogrel (Plavix), or aspirin, be sure to talk to your doctor. He or she will tell you if and when to start taking those medicines again. Make sure that you understand exactly what your doctor wants you to do. · Take pain medicines exactly as directed. ¨ If the doctor gave you a prescription medicine for pain, take it as prescribed. ¨ If you are not taking a prescription pain medicine, ask your doctor if you can take an over-the-counter medicine. ¨ Do not take two or more pain medicines at the same time unless the doctor told you to. Many pain medicines have acetaminophen, which is Tylenol. Too much acetaminophen (Tylenol) can be harmful. · If you think your pain medicine is making you sick to your stomach:  ¨ Take your medicine after meals (unless your doctor tells you not to). ¨ Ask your doctor for a different pain medicine. · If your doctor prescribed antibiotics, take them as directed. Do not stop taking them just because you feel better. You need to take the full course of antibiotics. · You may need to take some medicines in a different form. You will be told whether to crush pills or take a liquid form of the medicine. · If your doctor gives you a stool softener, take it as directed. Incision care  · If you have strips of tape on the incision, leave the tape on for a week or until it falls off. · Wash the area daily with warm, soapy water, and pat it dry. Follow-up care is a key part of your treatment and safety.  Be sure to make and go to all appointments, and call your doctor if you are having problems. It's also a good idea to know your test results and keep a list of the medicines you take. When should you call for help? Call 911 anytime you think you may need emergency care. For example, call if:  · You passed out (lost consciousness). · You have sudden chest pain and shortness of breath, or you cough up blood. · You have severe pain in your belly. Call your doctor now or seek immediate medical care if:  · You are sick to your stomach and cannot drink fluids or keep them down. · You have signs of a blood clot, such as:  ¨ Pain in your calf, back of the knee, thigh, or groin. ¨ Redness and swelling in your leg or groin. · You have a lot of diarrhea that smells very bad. · You have trouble passing urine or stool, especially if you have mild pain or swelling in your lower belly. · You have signs of infection, such as:  ¨ Increased pain, swelling, warmth, or redness. ¨ Red streaks leading from the incision. ¨ Pus draining from the incision. ¨ A fever. · You have pain that does not get better after you take pain medicine. · You have loose stitches, or your incision comes open. · You are bleeding or have new drainage from the incision. Watch closely for any changes in your health, and be sure to contact your doctor if:  · You do not have a bowel movement after taking a laxative. · You do not get better as expected. Where can you learn more? Go to http://jose manuel-nettie.info/. Enter 873 [id number] in the search box to learn more about \"Open Bowel Resection: What to Expect at Home. \"  Current as of: August 9, 2016  Content Version: 11.3  © 5413-6323 "[employer]. Care instructions adapted under license by [employer] (which disclaims liability or warranty for this information).  If you have questions about a medical condition or this instruction, always ask your healthcare professional. Dana Mckeon disclaims any warranty or liability for your use of this information.

## 2017-08-01 NOTE — DISCHARGE SUMMARY
Physician Discharge Summary     Patient ID:  Sirisha Jasso  [id number]  68 y.o.  1950    Admit Date: 7/25/2017    Discharge Date: 8/1/2017    Admission Diagnoses: Small bowel obstruction (HCC);SMALL BOWEL OBSTRUCTION [facility])    Discharge Diagnoses:  Principal Problem:    SBO (small bowel obstruction) ([facility] (7/25/2017)    Active Problems:    [facility] (7/25/2017)      Adenocarcinoma of cecum stage, IVb ([facility] (7/28/2017)         Admission Condition: Fair    Discharge Condition: Good    Last Procedure: Procedure(s):  [address] Course:   Normal hospital course for this procedure. Consults: Hematology/Oncology    Disposition: home    Patient Instructions:   Current Discharge Medication List      START taking these medications    Details   HYDROcodone-acetaminophen (NORCO) 5-325 mg per tablet Take 1-2 Tabs by mouth every four (4) hours as needed. Max Daily Amount: 12 Tabs. Qty: 20 Tab, Refills: 0         CONTINUE these medications which have NOT CHANGED    Details   aspirin 81 mg chewable tablet Take 81 mg by mouth daily. sildenafil citrate (VIAGRA) 50 mg tablet Take  by mouth as needed. Indications: unsure of dosage      atorvastatin (LIPITOR) 40 mg tablet Take 40 mg by mouth daily. lisinopril (PRINIVIL, ZESTRIL) 5 mg tablet Take 5 mg by mouth daily. MULTIVITAMIN PO Take  by mouth. For men 48 +. Takes one po every other day. metoprolol succinate (TOPROL-XL) 25 mg XL tablet Take 25 mg by mouth daily. acetaminophen (TYLENOL EXTRA STRENGTH) 500 mg tablet Take 500 mg by mouth as needed for Pain. STOP taking these medications       cephALEXin (KEFLEX) 500 mg capsule Comments:   Reason for Stopping:         oxyCODONE-acetaminophen (PERCOCET) 5-325 mg per tablet Comments:   Reason for Stopping:             Activity: No lifting,  or Strenuous exercise for 4 weeks  Diet: Low fat, Low cholesterol  Wound Care:  As directed    Follow-up with Dr Patricia Rivero in 10 days. Also Dr Claire Ventura.       Signed:  Tamiko Feliciano MD  [facility]  8/1/2017  4:24 PM

## 2017-08-01 NOTE — PROGRESS NOTES
Progress Note      Patient: Sydnee Diaz MRN: [id number]  SSN: [id number]    YOB: 1950  Age: 78 y.o. Sex: male      Subjective:      Sydnee Diaz is a 78 y.o. male with new new diagnosis of metastatic colon carcinoma. He presented to the ER with worsening abdominal pain for 5 days. He was recognized to have bowel obstruction and underwent laparatomy with right hemicolectomy. The pathology shows a diagnosis of poorly differentiated carcinoma of the cecum. He is recovering from this surgery. 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 Petersen. He had a radical prostatectomy few months ago by Dr. Yunior Song. He suffers with CAD and had a CABG in the remote past.    INTERVAL HISTORY: Up in chair. NG tube out. Advancing diet. Pain controlled. No SOB. Review of Systems:    Constitutional: negative  Eyes: negative  Ears, Nose, Mouth, Throat, and Face: negative  Respiratory: negative  Cardiovascular: negative  Gastrointestinal: positive for abdominal pain  Genitourinary:negative  Integument/Breast: negative  Hematologic/Lymphatic: negative  Musculoskeletal:negative  Neurological: negative     Prior to Admission medications    Medication Sig Start Date End Date Taking? Authorizing Provider   aspirin 81 mg chewable tablet Take 81 mg by mouth daily. Yes Historical Provider   sildenafil citrate (VIAGRA) 50 mg tablet Take  by mouth as needed. Indications: unsure of dosage   Yes Historical Provider   atorvastatin (LIPITOR) 40 mg tablet Take 40 mg by mouth daily. Yes Historical Provider   lisinopril (PRINIVIL, ZESTRIL) 5 mg tablet Take 5 mg by mouth daily. Yes Historical Provider   MULTIVITAMIN PO Take  by mouth. For men 48 +. Takes one po every other day. Yes Historical Provider   metoprolol succinate (TOPROL-XL) 25 mg XL tablet Take 25 mg by mouth daily.    Yes Historical Provider   acetaminophen (TYLENOL EXTRA STRENGTH) 500 mg tablet Take 500 mg by mouth as needed for Pain. Yes Historical Provider   cephALEXin (KEFLEX) 500 mg capsule Take 1 Cap by mouth three (3) times daily. 4/6/17   Olvin Rosa MD   oxyCODONE-acetaminophen (PERCOCET) 5-325 mg per tablet Take 1-2 Tabs by mouth every four (4) hours as needed. Max Daily Amount: 12 Tabs. 4/6/17   Olvin Rosa MD              Allergies   Allergen Reactions    Plavix [Clopidogrel] Rash           Objective:     Vitals:    07/31/17 2330 08/01/17 0407 08/01/17 0751 08/01/17 1115   BP: (!) 154/94 (!) 172/92 (!) 158/97 122/86   Pulse: 80 76 81 82   Resp: 15 16 15 16   Temp: 98.3 °F (36.8 °C) 98.9 °F (37.2 °C) 98.6 °F (37 °C) 98 °F (36.7 °C)   SpO2: 92% 94% 95% 95%   Weight:       Height:                Physical Exam:    GENERAL: alert, cooperative, no distress, appears stated age  EYE: negative  LYMPHATIC: Cervical, supraclavicular, and axillary nodes normal.   THROAT & NECK: NG tube in place, otherwise, normal and no erythema or exudates noted. LUNG: clear to auscultation bilaterally  HEART: regular rate and rhythm  ABDOMEN: soft, dressing on the abdominal wall  EXTREMITIES:  no edema  SKIN: Normal.  NEUROLOGIC: negative        CT Results (most recent):    Results from Hospital Encounter encounter on 07/25/17   CT ABD PELV W CONT   Narrative EXAM:  CT ABD PELV W CONT  INDICATION:   lower abd pain  COMPARISON: CT of the pelvis, 3/16/2017. .  . TECHNIQUE:   Multislice helical CT was performed from the thoracic inlet to the pubic  symphysis with intravenous contrast administration. Contiguous 5 mm axial images  were reconstructed and lung and soft tissue windows were generated. Coronal and  sagittal reformations were generated. CT dose reduction was achieved through use of a standardized protocol tailored  for this examination and automatic exposure control for dose modulation. Екатерина Eden FINDINGS:  CHEST:  Chest wall/thoracic inlet: Within normal limits. Thyroid: Within normal limits. Mediastinum/mariposa:  Within normal limits. Heart/vessels: Within normal limits. Lungs/Pleura: Within normal limits. .  ABDOMEN:  Liver: There is an ill-defined area of diminished attenuation in the hepatic  dome that measures approximately 1.2 cm in diameter (#11, series 2). Gallbladder/Biliary: Within normal limits. Spleen: Within normal limits. Pancreas: Within normal limits. Adrenals: Within normal limits. Kidneys: Within normal limits. Peritoneum/Mesenteries: Adenopathy in the laith colic mesentery adjacent to the  cecum. The largest individual lymph node measures approximately 2.4 x 1.4 cm. Small amount of fluid in the pelvis  Extraperitoneum: Within normal limits. Gastrointestinal tract: Fluid-filled distended distal small bowel to the  ileocecal valve. There is irregularity in the cecum at the level of the  ileocecal valve  Vascular: Calcifications in the aorta and SMA. Calcifications at the origin of  both renal arteries. Naoma Broccoli PELVIS:  Extraperitoneum: Within normal limits. Ureters: Within normal limits. Bladder: Within normal limits. Reproductive System: Within normal limits. .  MSK:   Left hip arthroplasty. There is a fluid collection in continuity with the left  hip joint space. Degenerative changes in the spine. Vacuum disc phenomenon at  L3/L4. .       Impression IMPRESSION:  1. Small bowel obstruction. There is irregularity in the cecum at the ileocecal  valve with adjacent adenopathy which is concerning for neoplasm. 2. There is a small, ill-defined area of diminished attenuation in the hepatic  dome. This lesion is incompletely characterized, but would be concerning for  metastatic disease if the finding in the right lower quadrant represents  neoplasm. 3. Cystic lesion in continuity with the left hip which could represent a  degenerative collection, possibly associated with the left hip arthroplasty. 4. Incidental findings as above.               Lab Results   Component Value Date/Time    WBC 4.9 07/29/2017 02:43 AM    HGB 12.2 07/29/2017 02:43 AM    HCT 37.4 07/29/2017 02:43 AM    PLATELET 986 17/84/8380 02:43 AM    MCV 92.6 07/29/2017 02:43 AM       Lab Results   Component Value Date/Time    Sodium 136 07/29/2017 02:43 AM    Potassium 3.7 07/29/2017 02:43 AM    Chloride 102 07/29/2017 02:43 AM    CO2 27 07/29/2017 02:43 AM    Anion gap 7 07/29/2017 02:43 AM    Glucose 135 07/29/2017 02:43 AM    BUN 9 07/29/2017 02:43 AM    Creatinine 0.93 07/29/2017 02:43 AM    BUN/Creatinine ratio 10 07/29/2017 02:43 AM    GFR est AA >60 07/29/2017 02:43 AM    GFR est non-AA >60 07/29/2017 02:43 AM    Calcium 8.2 07/29/2017 02:43 AM    Bilirubin, total 1.0 07/26/2017 02:35 AM    AST (SGOT) 29 07/26/2017 02:35 AM    Alk. phosphatase 42 07/26/2017 02:35 AM    Protein, total 6.4 07/26/2017 02:35 AM    Albumin 3.0 07/26/2017 02:35 AM    Globulin 3.4 07/26/2017 02:35 AM    A-G Ratio 0.9 07/26/2017 02:35 AM    ALT (SGPT) 35 07/26/2017 02:35 AM             Assessment:     1. 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 We will treat him with 3 months of adjuvant mFOLFOX  Redo staging scans  If the disease has not progressed, we shall have the liver lesion resected  Then complete 3 more months of systemic chemotherapy    Reviewed the chemotherapy regimen, it's logistics, and potential toxicities in detail. Potential side effects include, but are not limited to, nausea, vomiting, diarrhea, taste changes, myelosuppression, infection, fatigue, allergic reactions, and rarely, death. Will need follow-up in the office prior to starting chemotherapy to review.          Plan:        > Outpatient follow-up on 8/16 at 3:30pm.  > He will need systemic chemotherapy.  > If the disease behaves, he could have a liver resection and hopefully cured. > Discuss case in tumor board. Signed By: Renny Ellison NP     August 1, 2017            Attending Physician Note:     I have reviewed the history, physical examination, assessment and the plan of the care of the patient. I saw the patient with Tejal Marshall and I participated in the examination and critical decision making. I agree with the note as stated above. Mr. Radha Pan is a gentleman with a new diagnosis of metastatic cecal carcinoma. He is recovering from a laparotomy. We will see him in office in f/u and plan to treat him with systemic chemotherapy.        Signed by: Gaviota Conway MD                     August 1, 2017

## 2017-08-01 NOTE — PROGRESS NOTES
End of Shift Nursing Note    Bedside shift change report given to JazmineTapan Maya (oncoming nurse) by Enedina Bedoya (offgoing nurse). Report included the following information SBAR, Kardex, OR Summary, Procedure Summary, Intake/Output, MAR, Accordion and Recent Results. Significant changes during shift:    Uncomfortable and restless. Non-emergent issues for physician to address:   none     Number times ambulated in hallway past shift: 0     Number of times OOB to chair past shift: 1    POD #: 4     Vital Signs:    Temp: 98.9 °F (37.2 °C)     Pulse (Heart Rate): 76     BP: (!) 172/92     Resp Rate: 16     O2 Sat (%): 94 %    Lines & Drains:     Urinary Catheter? No       NG tube [] in [] removed [x] not applicable   Drains [] in [] removed [x] not applicable     Skin Integrity:      Wounds: yes   Dressings Present: yes    Wound Concerns: no      GI:    Current diet:  DIET FULL LIQUID    Nausea: NO  Vomiting: NO  Bowel Sounds: YES  Flatus: YES  Last Bowel Movement: today      Respiratory:  Supplemental O2: No        Incentive Spirometer: YES  Volume: 1500   Coughing and Deep Breathing: YES  Oral Care: YES  Understanding (patient/family education): YES   Getting out of bed: YES  Head of bed elevation: YES    Patient Safety:    Falls Score: 1  Mobility Score: 1  Bed Alarm On? No  Sitter? No      Opportunity for questions and clarification was given to oncoming nurse. Patient bed is in low position, side rails are up x 2, door & observation blinds open as needed, call bell within reach and patient not in distress.     Nelson Hopper RN

## 2017-08-01 NOTE — PROGRESS NOTES
Interdisciplinary Rounds were completed on this patient. Rounds included nursing, clinical care leader, pharmacy, and case management. Patient was doing well without problems, sitting up in chair. Patient had the following concerns: loose stools, liquid diet. Goals for the day will include: mobilize and follow up with physician regarding diet increase, pharmacy to follow up on medications (d/c protonix, start floraQ).      Anticipated discharge date: 8/2/2017

## 2017-08-02 NOTE — TELEPHONE ENCOUNTER
8/2/17 3:25pm    ONN called to f/u on questions about diet, explained that since discharged would send a note to Oncology dietician Martita Rangel. Explained she may be calling him or we would provide information @ 8/16/17 office vs.     Asking about staple removal, states he has surgical f/u 8/11. Spoke to Dr. Mouna Khanna office early today about his concerns of staples being in 2 1/2 weeks. Office informed him it was ok. Verbalizes willingness to go to another office for f/u if this means he can get earlier appt. Explained I would check on this for him.      States \"I have had a lot of surgeries, but this one took the wind out of my sails\"  Encouraged to rest, take one day at a time, and allow his body to heal.     Camacho Crawford RN Oncology Nurse Navigator

## 2017-08-04 NOTE — ED NOTES
Received bedside report from Zach MALHOTRA [facility]. [facility]ummed pt care. Pt awake, alert and in no distress.

## 2017-08-04 NOTE — ED NOTES
Dr. Sheryle Bend at bedside to provide discharge paperwork. Vital signs stable. Pt in no apparent distress at this time. Mental status at baseline. Ambulatory to waiting room with steady gate, discharge paperwork in hand. Accompanied by wife.

## 2017-08-04 NOTE — ED PROVIDER NOTES
HPI Comments: Lu Lang is a 78 y.o. male who presents ambulatory to [facility] ED with cc of R flank pain. Pt states that pain has been intermittent since onset with frequent episodes of sharp and stabbing pain, lasting approximately 5-10 seconds before resolving without intervention. Pt states that symptoms onset at approximately 1615 this afternoon, but denies taking any medications for relief of symptoms. Of note, pt states that he recently had an exploratory laparotomy on 07/25/2017 with a R colectomy and liver biopsy secondary to new diagnosis of metastatic colon carcinoma. The pathology shows a diagnosis of poorly differentiated carcinoma of the cecum. Pt states that he was discharged home and has been recovering well from the operation until onset of R flank pain this evening. He specifically denies any nausea, vomiting, diarrhea, constipation, blood in the stool, fever, chills, cough, hemoptysis, or problems with urination. Of note, pt currently defers any narcotic pain medications, states that he prefers to take tylenol. Raffy Renae MD    PMHx: HLD, CABG x 3, HTN, CHF, COPD, prostate CA, colon CA  Social Hx: - smoking; - EtOH; - illicit drug use    There are no other complains, changes, or physical findings at this time. The history is provided by the patient. No  was used.         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) - surgery    Cancer ([address]     Prostate Cancer    Carotid artery occlusion     reports 100% occlusion on one side and 55-70% blockage in the over side (this has not changed in at least 5-6 yrs)/has doppler study on neck 2 times a yr    Chronic obstructive pulmonary disease (HCC)     Chronic pain     r/t arthritis    Congestive heart failure (HCC)     Hyperlipidemia     Hypertension     S/P CABG x 3 12/6/2012       Past Surgical History:   Procedure Laterality Date    CABG, ARTERY-VEIN, THREE  12/6/2012    catherization    COLONOSCOPY N/A 8/19/2016    COLONOSCOPY performed by Arnol Harvey [facility] at [facility] ENDOSCOPY    HX APPENDECTOMY      HX GI      colonoscopy 2012 - polyp    HX HEENT      tonsillectomy    HX HEENT  2015    Mouth Cancer surgery    HX ORTHOPAEDIC Left     hip replacement     HX ORTHOPAEDIC      plantar warts removed    HX ORTHOPAEDIC Right     foot    HX OTHER SURGICAL  12/3/15    bx right side of mouth/cancer removed with lymph nodes in neck removed         Family History:   Problem Relation Age of Onset    Heart Disease Mother     Hypertension Mother     Diabetes Mother     Psychiatric Disorder Father     Asthma Daughter     Anesth Problems Neg Hx        Social History     Social History    Marital status:      Spouse name: N/A    Number of children: N/A    Years of education: N/A     Occupational History    Not on file. Social History Main Topics    Smoking status: Former Smoker     Packs/day: 1.50     Years: 50.00     Quit date: 12/6/2012    Smokeless tobacco: Former User    Alcohol use 12.0 oz/week     20 Cans of beer per week    Drug use: Yes     Special: Prescription, OTC    Sexual activity: Not on file     Other Topics Concern    Not on file     Social History Narrative         ALLERGIES: Plavix [clopidogrel]    Review of Systems   Constitutional: Negative for chills, diaphoresis, fever and unexpected weight change. HENT: Negative for rhinorrhea and sore throat. Eyes: Negative for pain. Respiratory: Negative for cough, shortness of breath, wheezing and stridor.          Denies hemoptysis   Cardiovascular: Negative for chest pain and leg swelling. Gastrointestinal: Negative for abdominal pain, blood in stool, constipation, diarrhea, nausea and vomiting. Genitourinary: Positive for flank pain. Negative for difficulty urinating, dysuria, hematuria and urgency. Musculoskeletal: Negative for back pain and neck stiffness. Skin: Negative for rash. Neurological: Negative for seizures, syncope, weakness, light-headedness and headaches. Psychiatric/Behavioral: Negative for confusion. Vitals:    08/03/17 1701 08/03/17 1730 08/03/17 1745 08/03/17 1840   BP: (!) 169/93 144/76 149/78    Pulse: 83   75   Resp: 18   18   Temp: 97.8 °F (36.6 °C)      SpO2: 98% 94% 95%    Weight: 89.1 kg (196 lb 6.9 oz)      Height: 5' 9\" (1.753 m)               Physical Exam   Constitutional: He is oriented to person, place, and time. He appears well-developed and well-nourished. No distress. HENT:   Nose: Nose normal.   Mouth/Throat: Oropharynx is clear and moist. No oropharyngeal exudate. Eyes: Conjunctivae and EOM are normal. Pupils are equal, round, and reactive to light. Right eye exhibits no discharge. Left eye exhibits no discharge. No scleral icterus. Neck: Normal range of motion. Neck supple. No JVD present. Cardiovascular: Normal rate, regular rhythm, normal heart sounds and intact distal pulses. No murmur heard. Pulmonary/Chest: Effort normal and breath sounds normal. No stridor. No respiratory distress. He has no wheezes. He has no rales. Abdominal: Soft. Bowel sounds are normal. He exhibits no distension. There is no tenderness. There is no rebound, no guarding and no CVA tenderness. Well healing midline ex-lap surgical scar with staples in place;  During exam, pt presented with numerous frequent paroxysms of severe R flank pain   Musculoskeletal: He exhibits no edema or tenderness. Neurological: He is alert and oriented to person, place, and time. Skin: Skin is warm and dry. He is not diaphoretic.    Well healed sternotomy scar Psychiatric: He has a normal mood and affect. Nursing note and vitals reviewed. MDM  Number of Diagnoses or Management Options  Adenocarcinoma of cecum stage, IVb [facility]):   Flank pain:   Diagnosis management comments: Intermittent paroxysms of right flank pain. Labs unremarkable. CT scan reassuring with no evidence of post surgical complication. Symptoms improved in the ED. Stable for discharge home. Strict return precautions given. Amount and/or Complexity of Data Reviewed  Clinical lab tests: ordered and reviewed  Tests in the radiology section of CPT®: ordered and reviewed  Tests in the medicine section of CPT®: reviewed and ordered  Review and summarize past medical records: yes  Independent visualization of images, tracings, or specimens: yes    Patient Progress  Patient progress: stable    ED Course       Procedures    EKG interpretation: (Preliminary)  18:25  Rhythm: normal sinus rhythm and RBBB; and regular . Rate (approx.): 74; Axis: normal; WY interval: normal; QRS interval: normal}; Other findings: Left anterior fascicular block. Progress Note:  10:22 PM  Pt reassessed, states that his pain is improved. Has not completely gone, but frequency of the spasm pains has gotten farther apart. Reviewed all imaging and lab results. Pt is eager to go home. Plan to discharge.      LABORATORY TESTS:  Recent Results (from the past 12 hour(s))   EKG, 12 LEAD, INITIAL    Collection Time: 08/03/17  6:25 PM   Result Value Ref Range    Ventricular Rate 74 BPM    Atrial Rate 74 BPM    P-R Interval 122 ms    QRS Duration 126 ms    Q-T Interval 396 ms    QTC Calculation (Bezet) 439 ms    Calculated P Axis 10 degrees    Calculated R Axis -63 degrees    Calculated T Axis -5 degrees    Diagnosis       Normal sinus rhythm  Right bundle branch block  Left anterior fascicular block  ** Bifascicular block **  When compared with ECG of 25-JUL-2017 06:01,  Right bundle branch block has replaced Nonspecific intraventricular block     CBC WITH AUTOMATED DIFF    Collection Time: 08/03/17  6:43 PM   Result Value Ref Range    WBC 7.9 4.1 - 11.1 K/uL    RBC 3.92 (L) 4.10 - 5.70 M/uL    HGB 11.9 (L) 12.1 - 17.0 g/dL    HCT 35.7 (L) 36.6 - 50.3 %    MCV 91.1 80.0 - 99.0 FL    MCH 30.4 26.0 - 34.0 PG    MCHC 33.3 30.0 - 36.5 g/dL    RDW 14.5 11.5 - 14.5 %    PLATELET 316 (H) 886 - 400 K/uL    NEUTROPHILS 67 %    BAND NEUTROPHILS 1 %    LYMPHOCYTES 21 %    MONOCYTES 10 %    EOSINOPHILS 0 %    BASOPHILS 0 %    MYELOCYTES 1 %    ABS. NEUTROPHILS 5.4 K/UL    ABS. LYMPHOCYTES 1.7 K/UL    ABS. MONOCYTES 0.8 K/UL    ABS. EOSINOPHILS 0.0 K/UL    ABS.  BASOPHILS 0.0 K/UL    RBC COMMENTS NORMOCYTIC, NORMOCHROMIC      WBC COMMENTS REACTIVE LYMPHS     NUCLEATED RBC    Collection Time: 08/03/17  6:43 PM   Result Value Ref Range    NRBC 0.0 0  WBC    ABSOLUTE NRBC 0.00 0.00 - 0.01 K/uL   URINALYSIS W/ REFLEX CULTURE    Collection Time: 08/03/17  7:16 PM   Result Value Ref Range    Color YELLOW/STRAW      Appearance CLOUDY (A) CLEAR      Specific gravity 1.024 1.003 - 1.030      pH (UA) 6.0 5.0 - 8.0      Protein TRACE (A) NEG mg/dL    Glucose NEGATIVE  NEG mg/dL    Ketone NEGATIVE  NEG mg/dL    Bilirubin NEGATIVE  NEG      Blood NEGATIVE  NEG      Urobilinogen 0.2 0.2 - 1.0 EU/dL    Nitrites NEGATIVE  NEG      Leukocyte Esterase NEGATIVE  NEG      WBC 0-4 0 - 4 /hpf    RBC 0-5 0 - 5 /hpf    Epithelial cells FEW FEW /lpf    Bacteria NEGATIVE  NEG /hpf    UA:UC IF INDICATED CULTURE NOT INDICATED BY UA RESULT CNI     LACTIC ACID    Collection Time: 08/03/17  7:20 PM   Result Value Ref Range    Lactic acid 0.6 0.4 - 2.0 MMOL/L   METABOLIC PANEL, COMPREHENSIVE    Collection Time: 08/03/17  7:20 PM   Result Value Ref Range    Sodium 141 136 - 145 mmol/L    Potassium 4.1 3.5 - 5.1 mmol/L    Chloride 109 (H) 97 - 108 mmol/L    CO2 26 21 - 32 mmol/L    Anion gap 6 5 - 15 mmol/L    Glucose 92 65 - 100 mg/dL    BUN 12 6 - 20 MG/DL    Creatinine 0. 83 0.70 - 1.30 MG/DL    BUN/Creatinine ratio 14 12 - 20      GFR est AA >60 >60 ml/min/1.73m2    GFR est non-AA >60 >60 ml/min/1.73m2    Calcium 8.4 (L) 8.5 - 10.1 MG/DL    Bilirubin, total 0.3 0.2 - 1.0 MG/DL    ALT (SGPT) 67 12 - 78 U/L    AST (SGOT) 41 (H) 15 - 37 U/L    Alk. phosphatase 54 45 - 117 U/L    Protein, total 6.6 6.4 - 8.2 g/dL    Albumin 3.0 (L) 3.5 - 5.0 g/dL    Globulin 3.6 2.0 - 4.0 g/dL    A-G Ratio 0.8 (L) 1.1 - 2.2         IMAGING RESULTS:  CT Results  (Last 48 hours)               08/03/17 2021  CT ABD PELV W CONT Final result    Impression:  IMPRESSION:       1. Status post right colectomy. No complication identified secondary to this. There is no abscess or abnormal fluid collection other than expected   postoperative minimal fluid in this region. 2. The appearance of the liver has changed slightly. There is now a new lesion   identified along the surface of the right lobe of the liver laterally. The   previously described lesion in the dome of the liver is again noted as well as. The possibility of metastatic disease should be considered in light of the   surgical findings. 3. The cystic structure in the extraperitoneal region extending through the   inguinal canal into the soft tissues anterior to the acetabulum on the left   unchanged from the previous exam..               Narrative:  INDICATION: paroxysmal severe R flank pain, recent ex-lap surgery  status post   right colectomy for cecal carcinoma 7/28/2017       COMPARISON: 7/25/2017       TECHNIQUE:    Following the uneventful intravenous administration of 100 cc Isovue-370, thin   axial images were obtained through the abdomen and pelvis. Coronal and sagittal   reconstructions were generated. Oral contrast was not administered. CT dose   reduction was achieved through use of a standardized protocol tailored for this   examination and automatic exposure control for dose modulation.        FINDINGS:    LUNG BASES: Calcified granuloma again noted in the right middle lobe base. LIVER: The small lucent lesion at the dome of the liver previously described is   again seen with surrounding enhancement. It is approximately 1.3 cm in size in   hepatic segment 8. In addition to this lesion there is a focal lesion along the   periphery of segment 8 with an enhancing rim. This is approximately 1.5 cm in   size. This is better visualized on the current examination. GALLBLADDER: Unremarkable. SPLEEN: No mass. PANCREAS: No mass or ductal dilatation. ADRENALS: Unremarkable. KIDNEYS: No mass, calculus, or hydronephrosis. GI: Since the previous examination there has been a right colectomy. The   ileocolic anastomosis is identified in the right abdomen. There is a slight   amount of edema in the peritoneal fat on the right, however this is well within   expected appearance for this point postoperative. There is a small amount of   ascites at the tip of the right lobe of the liver and in the paracolic gutter in   this region. Again this is expected postoperatively. There is no evidence of   bowel obstruction. APPENDIX: Absent   PERITONEUM: Ascites is also noted in the pelvis which is minimal. There is no   free intraperitoneal air. RETROPERITONEUM: No lymphadenopathy or aortic aneurysm. URINARY BLADDER: No mass or calculus. PELVIS: The cystic structure in the extraperitoneal region on the left adjacent   to the hip is again noted. This is unchanged from the previous study. There is   no surrounding edema. Total hip replacement again noted on the left. BONES: No destructive bone lesion.    ADDITIONAL COMMENTS: N/A               VITALS:  Patient Vitals for the past 12 hrs:   Temp Pulse Resp BP SpO2   08/03/17 2200 - - - 170/83 95 %   08/03/17 2145 - - - 169/89 94 %   08/03/17 2130 - - - 170/90 96 %   08/03/17 2115 - - - 164/89 96 %   08/03/17 2100 - - - 172/88 96 %   08/03/17 2049 - - - 178/83 -   08/03/17 1840 - 75 18 - -   08/03/17 1745 - - - 149/78 95 %   08/03/17 1730 - - - 144/76 94 %   08/03/17 1701 97.8 °F (36.6 °C) 83 18 (!) 169/93 98 %       MEDICATIONS GIVEN:  Medications   sodium chloride 0.9 % bolus infusion 1,000 mL (0 mL IntraVENous IV Completed 8/3/17 1930)   HYDROmorphone (PF) (DILAUDID) injection 0.5 mg (0.5 mg IntraVENous Given 8/3/17 1842)   0.9% sodium chloride infusion (50 mL/hr IntraVENous New Bag 8/3/17 2022)   iopamidol (ISOVUE-370) 76 % injection 100 mL (100 mL IntraVENous Given 8/3/17 2022)   sodium chloride (NS) flush 10 mL (10 mL IntraVENous Given 8/3/17 2022)       IMPRESSION:  1. Flank pain    2. Adenocarcinoma of cecum stage, IVb ([facility] [address]        PLAN:  1. Current Discharge Medication List        2. Follow-up Information     Follow up With Details Comments Contact Info    Hernandez Swift MD Call in 2 days  [address] [place] [address] [phone]. [phone]      [facility]  As needed, If symptoms worsen [address]  [address] [facility]  [phone]        3. Return to ED if worse     Discharge Note:  10:26 PM  The patient has been re-evaluated and is ready for discharge. Reviewed available results with patient. Counseled patient on diagnosis and care plan. Patient has expressed understanding, and all questions have been answered. Patient agrees with plan and agrees to follow up as recommended, or to return to the ED if their symptoms worsen. Discharge instructions have been provided and explained to the patient, along with reasons to return to the ED. This note is prepared by Quirino Villarreal, acting as Scribe for Rohan Hazel MD.    Rohan Hazel MD: The scribe's documentation has been prepared under my direction and personally reviewed by me in its entirety. I confirm that the note above accurately reflects all work, treatment, procedures, and medical decision making performed by me.

## 2017-08-08 NOTE — PROGRESS NOTES
Spoke with Dr. Lin Markham regarding pain medication. Patient may have pain medication. Will  prescription at . Patient understood.

## 2017-08-16 NOTE — PROGRESS NOTES
Jaden Fernández is a 78 y.o. male here for new patient for adenocarcinoma of the cecum. Mr. Warner Fuller has a reminder for a \"due or due soon\" health maintenance. I have asked that he contact his primary care provider for follow-up on this health maintenance.

## 2017-08-16 NOTE — PATIENT INSTRUCTIONS
You will need chemotherapy to treat your cancer. Prior to starting chemotherapy, you will need a port placed. Chemotherapy will consist of two different IV medications given to you through your port. These medications are Flourouracil (5-FU) and Oxaliplatin or FOLFOX. You will receive treatment one day every 2 weeks for a total of 6 cycles or 3 months. You will go home with a pump that will continuously infuse chemotherapy for 48 hours. You will then return to the infusion center to have the pump removed. After 3 months of chemotherapy, we will repeat scans. If the disease has not progressed, we will proceed with surgery to have your liver lesion resected. After surgery, you will receive an additional 3 months of chemotherapy. On the day of your chemo you will report to the infusion center, [address], the nurses will access your port and draw labs, they will then send you over to our office, [address]. We will look at the labs, discuss any symptoms/ potential side effects, and answer any questions. We will then send you back to the infusion center to complete your infusion. Please take all of your medications as prescribed and eat breakfast prior to your arrival.    Sadaf Plana is not provided, but there is limited snacks/drinks available for the patient only. Please be mindful that space is limited therefore please allow 1 person to accompany you to your treatments. You will need a  for your infusions, however, it is not mandatory someone stays with your during your treatment. Two prescriptions of anti-nausea medications will be sent to your pharmacy. If for any reason any of the prescriptions are extremely expensive please notify our office. You will be given long acting anti-nausea medications through your port prior to your treatments. We prescribe at home medications incase you are to experience nausea. Please call our office if not effective.     Please make sure you have imodium OTC at home prior to starting treatments. This is incase you are to experience diarrhea. If this is not effective please call our office. [phone]. A prescription for Emla cream was also sent to your pharmacy. You can apply this cream to your port site before coming to the infusion center for your chemotherapy. This will numb the site prior to accessing your port.

## 2017-08-16 NOTE — MR AVS SNAPSHOT
Visit Information Date & Time Provider Department Dept. Phone Encounter #  
 8/16/2017  3:30 PM Rolando Montes [facility] at [facility] [phone] [id number] Upcoming Health Maintenance Date Due Hepatitis C Screening 1950 DTaP/Tdap/Td series (1 - Tdap) 10/30/1970 FOBT Q 1 YEAR AGE 50-75 10/30/1999 ZOSTER VACCINE AGE 60> 8/30/2009 GLAUCOMA SCREENING Q2Y 10/30/2014 MEDICARE YEARLY EXAM 10/30/2014 INFLUENZA AGE 9 TO ADULT 8/1/2017 Pneumococcal 65+ High/Highest Risk (2 of 2 - PPSV23) 12/11/2017 Allergies as of 8/16/2017  Review Complete On: 8/16/2017 By: Raine Giron LPN Severity Noted Reaction Type Reactions Plavix [Clopidogrel]  10/19/2012    Rash Current Immunizations  Reviewed on 8/16/2017 Name Date Influenza Vaccine 10/1/2015 Influenza Vaccine Split 9/1/2012 ZZZ-RETIRED (DO NOT USE) Pneumococcal Vaccine (Unspecified Type) 12/11/2012  2:02 PM  
  
 Reviewed by Raine Giron LPN on 2/90/8699 at  3:23 PM  
You Were Diagnosed With   
  
 Codes Comments Adenocarcinoma of cecum stage, IVb ([facility]    -  Primary ICD-10-CM: C18.0 ICD-9-CM: 153.4 Vitals BP Pulse Temp Resp Height(growth percentile) Weight(growth percentile) 146/84 (BP 1 Location: Left arm, BP Patient Position: Sitting) 83 98.7 °F (37.1 °C) (Oral) 18 5' 9\" (1.753 m) 195 lb 9.6 oz (88.7 kg) SpO2 BMI Smoking Status 95% 28.89 kg/m2 Former Smoker Vitals History BMI and BSA Data Body Mass Index Body Surface Area  
 28.89 kg/m 2 2.08 m 2 Preferred Pharmacy Pharmacy Name Phone [id number] [phone] [facility] [phone] Your Updated Medication List  
  
   
This list is accurate as of: 8/16/17  4:11 PM.  Always use your most recent med list.  
  
  
  
  
 aspirin 81 mg chewable tablet Take 81 mg by mouth daily. atorvastatin 40 mg tablet Commonly known as:  LIPITOR Take 40 mg by mouth daily. * HYDROcodone-acetaminophen 5-325 mg per tablet Commonly known as:  Martha [place] Take 1-2 Tabs by mouth every four (4) hours as needed. Max Daily Amount: 12 Tabs. * HYDROcodone-acetaminophen 5-325 mg per tablet Commonly known as:  Martha [place] Take 1-2 Tabs by mouth every four (4) hours as needed for Pain. Max Daily Amount: 12 Tabs.  
  
 lidocaine-prilocaine topical cream  
Commonly known as:  EMLA Apply  to affected area as needed for Pain. lisinopril 5 mg tablet Commonly known as:  Velora David Take 5 mg by mouth daily. metoprolol succinate 25 mg XL tablet Commonly known as:  TOPROL-XL Take 25 mg by mouth daily. MULTIVITAMIN PO Take  by mouth. For men 48 +. Takes one po every other day. ondansetron 4 mg disintegrating tablet Commonly known as:  ZOFRAN ODT Take 1 Tab by mouth every eight (8) hours as needed for Nausea. prochlorperazine 10 mg tablet Commonly known as:  COMPAZINE Take 0.5 Tabs by mouth every six (6) hours as needed for up to 7 days. sildenafil citrate 50 mg tablet Commonly known as:  VIAGRA Take  by mouth as needed. Indications: unsure of dosage TYLENOL EXTRA STRENGTH 500 mg tablet Generic drug:  acetaminophen Take 500 mg by mouth as needed for Pain. * Notice: This list has 2 medication(s) that are the same as other medications prescribed for you. Read the directions carefully, and ask your doctor or other care provider to review them with you. Prescriptions Sent to Pharmacy Refills  
 ondansetron (ZOFRAN ODT) 4 mg disintegrating tablet 1 Sig: Take 1 Tab by mouth every eight (8) hours as needed for Nausea. Class: Normal  
 Pharmacy: [place] [address], [address]  #: [phone]  Route: Oral  
 prochlorperazine (COMPAZINE) 10 mg tablet 1 Sig: Take 0.5 Tabs by mouth every six (6) hours as needed for up to 7 days. Class: Normal  
 Pharmacy: [address] Ph #: [phone] Route: Oral  
 lidocaine-prilocaine (EMLA) topical cream 0 Sig: Apply  to affected area as needed for Pain. Class: Normal  
 Pharmacy: [address] Ph #: [phone] Route: Topical  
  
To-Do List   
 08/16/2017 Imaging:  IR INSERT TUNL CVC W PORT OVER 5 YEARS Patient Instructions You will need chemotherapy to treat your cancer. Prior to starting chemotherapy, you will need a port placed. Chemotherapy will consist of two different IV medications given to you through your port. These medications are Flourouracil (5-FU) and Oxaliplatin or FOLFOX. You will receive treatment one day every 2 weeks for a total of 6 cycles or 3 months. You will go home with a pump that will continuously infuse chemotherapy for 48 hours. You will then return to the infusion center to have the pump removed. After 3 months of chemotherapy, we will repeat scans. If the disease has not progressed, we will proceed with surgery to have your liver lesion resected. After surgery, you will receive an additional 3 months of chemotherapy. On the day of your chemo you will report to the infusion center, [address], the nurses will access your port and draw labs, they will then send you over to our office, [address]. We will look at the labs, discuss any symptoms/ potential side effects, and answer any questions. We will then send you back to the infusion center to complete your infusion.  
 
Please take all of your medications as prescribed and eat breakfast prior to your arrival. 
 
Sadaf Plana is not provided, but there is limited snacks/drinks available for the patient only. Please be mindful that space is limited therefore please allow 1 person to accompany you to your treatments. You will need a  for your infusions, however, it is not mandatory someone stays with your during your treatment. Two prescriptions of anti-nausea medications will be sent to your pharmacy. If for any reason any of the prescriptions are extremely expensive please notify our office. You will be given long acting anti-nausea medications through your port prior to your treatments. We prescribe at home medications incase you are to experience nausea. Please call our office if not effective. Please make sure you have imodium OTC at home prior to starting treatments. This is incase you are to experience diarrhea. If this is not effective please call our office. [phone]. A prescription for Emla cream was also sent to your pharmacy. You can apply this cream to your port site before coming to the infusion center for your chemotherapy. This will numb the site prior to accessing your port. Introducing \[facility]\"" & HEALTH SERVICES! Dear Marlen Guadarrama: Thank you for requesting a [employer] account. Our records indicate that you have previously registered for a [employer] account but its currently inactive. Please call our [employer] support line at [phone]. Additional Information If you have questions, please visit the Frequently Asked Questions section of the [employer] website at [email or web address]. [employer]. Remember, [employer] is NOT to be used for urgent needs. For medical emergencies, dial 911. Now available from your iPhone and Android! Please provide this summary of care documentation to your next provider. Your primary care clinician is listed as Khadra Bedoya. If you have any questions after today's visit, please call [phone].

## 2017-08-17 NOTE — PROGRESS NOTES
Progress Note      Patient: Shiva Diop MRN: [id number]  SSN: [id number]    YOB: 1950  Age: 78 y.o. Sex: male      Subjective:      Shiva Diop is a 78 y.o. male with new new diagnosis of metastatic cecal carcinoma. He presented to the ER with worsening abdominal pain for 5 days. He was recognized to have bowel obstruction and underwent laparatomy with right hemicolectomy. The pathology shows a diagnosis of poorly differentiated carcinoma of the cecum. He is recovering from this surgery. 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 Petersen. He had a radical prostatectomy few months ago by Dr. Hobson Canavan. He suffers with CAD and had a CABG in the remote past. He is doing well. Appetite is good. He is accompanied by his girlfriend. Review of Systems:    Constitutional: negative  Eyes: negative  Ears, Nose, Mouth, Throat, and Face: negative  Respiratory: negative  Cardiovascular: negative  Gastrointestinal: negative  Genitourinary:negative  Integument/Breast: negative  Hematologic/Lymphatic: negative  Musculoskeletal:negative  Neurological: negative     Prior to Admission medications    Medication Sig Start Date End Date Taking? Authorizing Provider   ondansetron (ZOFRAN ODT) 4 mg disintegrating tablet Take 1 Tab by mouth every eight (8) hours as needed for Nausea. 17  Yes Amita Holcomb MD   prochlorperazine (COMPAZINE) 10 mg tablet Take 0.5 Tabs by mouth every six (6) hours as needed for up to 7 days. 17 Yes Amita Holcomb MD   lidocaine-prilocaine (EMLA) topical cream Apply  to affected area as needed for Pain. 17  Yes Amita Holcomb MD   aspirin 81 mg chewable tablet Take 81 mg by mouth daily. Yes Historical Provider   atorvastatin (LIPITOR) 40 mg tablet Take 40 mg by mouth daily. Yes Historical Provider   lisinopril (PRINIVIL, ZESTRIL) 5 mg tablet Take 5 mg by mouth daily.    Yes Historical Provider MULTIVITAMIN PO Take  by mouth. For men 48 +. Takes one po every other day. Yes Historical Provider   metoprolol succinate (TOPROL-XL) 25 mg XL tablet Take 25 mg by mouth daily. Yes Historical Provider   acetaminophen (TYLENOL EXTRA STRENGTH) 500 mg tablet Take 500 mg by mouth as needed for Pain. Yes Historical Provider   HYDROcodone-acetaminophen (NORCO) 5-325 mg per tablet Take 1-2 Tabs by mouth every four (4) hours as needed for Pain. Max Daily Amount: 12 Tabs. 8/8/17   Ciara Hdz MD   HYDROcodone-acetaminophen [facility]) 5-325 mg per tablet Take 1-2 Tabs by mouth every four (4) hours as needed. Max Daily Amount: 12 Tabs. 8/1/17   Becky Grant MD   sildenafil citrate (VIAGRA) 50 mg tablet Take  by mouth as needed. Indications: unsure of dosage    Historical Provider              Allergies   Allergen Reactions    Plavix [Clopidogrel] Rash           Objective:     Vitals:    08/16/17 1522   BP: 146/84   Pulse: 83   Resp: 18   Temp: 98.7 °F (37.1 °C)   TempSrc: Oral   SpO2: 95%   Weight: 195 lb 9.6 oz (88.7 kg)   Height: 5' 9\" (1.753 m)            Physical Exam:    GENERAL: alert, cooperative, no distress, appears stated age  EYE: negative  LYMPHATIC: Cervical, supraclavicular, and axillary nodes normal.   THROAT & NECK: normal and no erythema or exudates noted. LUNG: clear to auscultation bilaterally  HEART: regular rate and rhythm  ABDOMEN: soft, non-tender  EXTREMITIES:  no edema  SKIN: Normal.  NEUROLOGIC: negative        CT Results (most recent):    Results from Hospital Encounter encounter on 08/03/17   CT ABD PELV W CONT   Narrative INDICATION: paroxysmal severe R flank pain, recent ex-lap surgery  status post  right colectomy for cecal carcinoma 7/28/2017    COMPARISON: 7/25/2017    TECHNIQUE:   Following the uneventful intravenous administration of 100 cc Isovue-370, thin  axial images were obtained through the abdomen and pelvis. Coronal and sagittal  reconstructions were generated.  Oral contrast was not administered. CT dose  reduction was achieved through use of a standardized protocol tailored for this  examination and automatic exposure control for dose modulation. FINDINGS:   LUNG BASES: Calcified granuloma again noted in the right middle lobe base. LIVER: The small lucent lesion at the dome of the liver previously described is  again seen with surrounding enhancement. It is approximately 1.3 cm in size in  hepatic segment 8. In addition to this lesion there is a focal lesion along the  periphery of segment 8 with an enhancing rim. This is approximately 1.5 cm in  size. This is better visualized on the current examination. GALLBLADDER: Unremarkable. SPLEEN: No mass. PANCREAS: No mass or ductal dilatation. ADRENALS: Unremarkable. KIDNEYS: No mass, calculus, or hydronephrosis. GI: Since the previous examination there has been a right colectomy. The  ileocolic anastomosis is identified in the right abdomen. There is a slight  amount of edema in the peritoneal fat on the right, however this is well within  expected appearance for this point postoperative. There is a small amount of  ascites at the tip of the right lobe of the liver and in the paracolic gutter in  this region. Again this is expected postoperatively. There is no evidence of  bowel obstruction. APPENDIX: Absent  PERITONEUM: Ascites is also noted in the pelvis which is minimal. There is no  free intraperitoneal air. RETROPERITONEUM: No lymphadenopathy or aortic aneurysm. URINARY BLADDER: No mass or calculus. PELVIS: The cystic structure in the extraperitoneal region on the left adjacent  to the hip is again noted. This is unchanged from the previous study. There is  no surrounding edema. Total hip replacement again noted on the left. BONES: No destructive bone lesion. ADDITIONAL COMMENTS: N/A         Impression IMPRESSION:    1. Status post right colectomy. No complication identified secondary to this.   There is no abscess or abnormal fluid collection other than expected  postoperative minimal fluid in this region. 2. The appearance of the liver has changed slightly. There is now a new lesion  identified along the surface of the right lobe of the liver laterally. The  previously described lesion in the dome of the liver is again noted as well as. The possibility of metastatic disease should be considered in light of the  surgical findings. 3. The cystic structure in the extraperitoneal region extending through the  inguinal canal into the soft tissues anterior to the acetabulum on the left  unchanged from the previous exam..                Lab Results   Component Value Date/Time    WBC 7.9 08/03/2017 06:43 PM    HGB 11.9 08/03/2017 06:43 PM    HCT 35.7 08/03/2017 06:43 PM    PLATELET 773 09/76/5899 06:43 PM    MCV 91.1 08/03/2017 06:43 PM       Lab Results   Component Value Date/Time    Sodium 141 08/03/2017 07:20 PM    Potassium 4.1 08/03/2017 07:20 PM    Chloride 109 08/03/2017 07:20 PM    CO2 26 08/03/2017 07:20 PM    Anion gap 6 08/03/2017 07:20 PM    Glucose 92 08/03/2017 07:20 PM    BUN 12 08/03/2017 07:20 PM    Creatinine 0.83 08/03/2017 07:20 PM    BUN/Creatinine ratio 14 08/03/2017 07:20 PM    GFR est AA >60 08/03/2017 07:20 PM    GFR est non-AA >60 08/03/2017 07:20 PM    Calcium 8.4 08/03/2017 07:20 PM    Bilirubin, total 0.3 08/03/2017 07:20 PM    AST (SGOT) 41 08/03/2017 07:20 PM    Alk. phosphatase 54 08/03/2017 07:20 PM    Protein, total 6.6 08/03/2017 07:20 PM    Albumin 3.0 08/03/2017 07:20 PM    Globulin 3.6 08/03/2017 07:20 PM    A-G Ratio 0.8 08/03/2017 07:20 PM    ALT (SGPT) 67 08/03/2017 07:20 PM             Assessment:     1.  Metastatic poorly differentiated adenocarcinoma of the cecum:    T4a N2b M1a    > Signet ring histology  > Serosal penetration  > single hypodense lesion in the liver  > 9/14 LN +ve  > extracapsular spread  > Lymphovascular invasion present     ANJEL wild type  MSI stable    He will benefit from systemic chemotherapy. We will treat him with 3 months of mFOLFOX  Then I will restage the disease with repeat scans  If the disease has not progressed, we shall have the liver lesion resected  Then complete 3 more months of systemic chemotherapy    Reviewed the chemotherapy regimen, it's logistics, and potential toxicities in detail. Potential side effects include, but are not limited to, nausea, vomiting, diarrhea, taste changes, myelosuppression, infection, fatigue, allergic reactions, and rarely, death. He agrees on receiving mFOLFOX chemotherapy. Will need follow-up in the office prior to starting chemotherapy to review. Plan:        > Port-a-cath placement  > Start mFOLFOX chemotherapy for 6 cycles or 3 months  > Plan to obtain CT after 6 cycles  > If the disease does not progress - he could undergo resection of the   > He will need systemic chemotherapy.  > If the disease behaves, he could have a liver resection and hopefully cured. > Discuss case in tumor board. Signed By: Estrella Harry MD     August 17, 2017           CC. Geneva Laurent MD  CC.  Alan Leong MD

## 2017-08-17 NOTE — NURSE NAVIGATOR
8/16/17 3:45pm    Angel Amanda 78 y.o. Dx: Adenocarcinoma of Cecum w/ Liver Mets T4a N2b M1a    Met pt and girlfriend in medical oncology office for vs to discuss chemotherapy treatment plan. Affect appropriate, smiling and joking w/ staff. Admits to apprehension about what to expect. Hx Prostate Ca, however surgery was only treatment. Provided w/ booklet on Advanced Colorectal Cancer. [facility] Chemotherapy and You booklet and chemocare handouts for 5FU and Oxaliplatin. Used teaching to tool to explain [facility] placement. Provided w/ information flyer on [facility], explained procedure. Asking if Medicare will cover the cost of the medications. States he also has a Care Card. Provided w/ contact information for [facility], explained prior approval was obtained for medications. FOLFOX  treatment one day every 2 weeks for a total of 6 cycles or 3 months. Repeat scans to be completed, followed by liver resection. To complete 3 more months of chemotherapy.     PAC placement 8/25/17 10am    ONN PLAN:    -address advance directives  -provide education/resources pertaining to treatment  -follow through continuum of care    Total Time Spent 30 minutes    Joelle Ellis RN Oncology Nurse Navigator

## 2017-08-25 NOTE — ROUTINE PROCESS
Present for pre procedure consult and consent - questions reviewed with understanding - consent signed. Discharge instructions given and reviewed w/pt and friend - both verbalized understanding of all reviewed.

## 2017-08-25 NOTE — DISCHARGE INSTRUCTIONS
[facility]  Special Procedures/Angiography Department        Portacath Discharge Instructions      Watch for signs of infection:    1. Redness,   2. Fever, chills,   3. Increased pain, and/or drainage from the site. If this occurs, call your physician at once. Return next week for a [employer] Check check:     Do not sign in. Go to the podium, and ask them to call our department [phone]). Please try to come between 9:00AM and 1:00PM    Keep your dressing clean and dry. Leave the dressing in place until seen here next week. The dressing may be changed in your physicians office. Continue your previous diet and follow the medication reconciliation list.    You may take Tylenol, as directed on the label, for pain. Avoid ibuprofen (Advil, Motrin) and aspirin as they may cause you to bleed. Because you received sedation, you are not to drive or sign any legal documents for the next 24 hours. Do not lift anything heavier than 5 pounds with the affected arm for the next week. If you have any questions or concerns, please call [phone] and ask for the nurse on-call          Medication Side Effects    You received the medications that are checked. Please review the SIDE EFFECTS. ? Cefazolin (Ancef):  Itching, diarrhea, skin rash. ? Fentanyl:  Nausea/Vomiting, drowsiness, itching. ? Versed:  Sedation, drowsiness, cough, hiccups. Medication Side Effects    You received the medications that are checked. Please review the SIDE EFFECTS. ? Cefazolin (Ancef):  Itching, diarrhea, skin rash. ? Fentanyl:  Nausea/Vomiting, drowsiness, itching. ? Versed:  Sedation, drowsiness, cough, hiccups. ? Lidocaine:  Hypertension, constipation, confusion.

## 2017-08-25 NOTE — ROUTINE PROCESS
Discharge instructions given and reviewed with pt and pt voices complete understanding of all instructions given. Pt taken out via wheelchair and discharged to home with wife in car.

## 2017-08-25 NOTE — IP AVS SNAPSHOT
[address] [facility] 
[phone] Patient: Dianne Taylor MRN: [id number] RLI:14/50/6861 You are allergic to the following Allergen Reactions Plavix (Clopidogrel) Rash Recent Documentation Smoking Status Former Smoker Emergency Contacts Name Discharge Info Relation Home Work Mobile Frames,Jocelynn DISCHARGE CAREGIVER [3] Neighbor [4] [phone] About your hospitalization You were admitted on:  N/A You last received care in the:  [facility] IR You were discharged on:  August 25, 2017 Unit phone number:  [phone] Why you were hospitalized Your primary diagnosis was:  Not on File Providers Seen During Your Hospitalizations Provider Role Specialty Primary office phone Stacie Clark MD Attending Provider Hematology and Oncology [phone] Your Primary Care Physician (PCP) Primary Care Physician Office Phone Office Fax Estuardo Pugh [phone] Follow-up Information None Your Appointments Friday August 25, 2017 10:00 AM EDT  
IR INS TUNL CVC W PORT >5Y with [facility] ANGIO 1 [facility] ([address]) [address] [facility]  
[phone] DIET RESTRICTIONS NPO, do not eat or drink 8 hours prior to test. Take all medications not requiring food with sip of water, excluding blood thinners and diabetic medications. GENERAL INSTRUCTIONS 1. Bring any non [facility] facility films/images pertaining to the area of interest with you on the day of appointment. 2. Bring a list of all medications you are currently taking, including over the counter medications. 3. A valid order with Physicians signature is required for all scheduled tests.  4. Blood thinners and platelet inhibitors must be stopped 3-5 days prior to procedure. Consult your ordering physician prior to stopping them. 5. Time given is ARRIVAL time, not procedure time. 6. You must have a  present before a procedure is started. 7. The procedure may last 1-1 ½ hours. You may be required to stay 4-6 hours after the procedure for observation. If you have any questions please direct them to your ordering physician. Please report to Medical Office Billing 1 ([address] [facility],[address]), Outpatient Registration. Appointment time is arrival time unless otherwise noted. Wednesday August 30, 2017  8:00 AM EDT INFUSION 360 with CHAIR 2 ROSS [address] ([address]) [address]  
[phone] Go to [facility], [address], [address] [address] [place] Friday September 01, 2017  2:30 PM EDT INFUSION 15 RI with ROSS INFUSION NURSE 1  
[place] ([address]) [address]  
[phone] Go to [facility], [address], [address] [address] [place] Current Discharge Medication List  
  
Notice Cannot display patient medications because the patient has not yet been checked in. Discharge Instructions Louise Flanagan [facility] Special Procedures/Angiography Department [facility] Discharge Instructions Watch for signs of infection: 1. Redness,  
2. Fever, chills,  
3. Increased pain, and/or drainage from the site. If this occurs, call your physician at once. Return next week for a [employer] check: Do not sign in. Go to the podium, and ask them to call our department [phone]). Please try to come between 9:00AM and 1:00PM 
 
Keep your dressing clean and dry. Leave the dressing in place until seen here next week. The dressing may be changed in your physicians office. Continue your previous diet and follow the medication reconciliation list. 
 
You may take Tylenol, as directed on the label, for pain. Avoid ibuprofen (Advil, Motrin) and aspirin as they may cause you to bleed. Because you received sedation, you are not to drive or sign any legal documents for the next 24 hours. Do not lift anything heavier than 5 pounds with the affected arm for the next week. If you have any questions or concerns, please call [phone] and ask for the nurse on-call Medication Side Effects You received the medications that are checked. Please review the SIDE EFFECTS. ? Cefazolin (Ancef):  Itching, diarrhea, skin rash. ? Fentanyl:  Nausea/Vomiting, drowsiness, itching. ? Versed:  Sedation, drowsiness, cough, hiccups. Medication Side Effects You received the medications that are checked. Please review the SIDE EFFECTS. ? Cefazolin (Ancef):  Itching, diarrhea, skin rash. ? Fentanyl:  Nausea/Vomiting, drowsiness, itching. ? Versed:  Sedation, drowsiness, cough, hiccups. ? Lidocaine:  Hypertension, constipation, confusion. Discharge Orders None Introducing [address]! Dear Frida Robertson: Thank you for requesting a [employer] account. Our records indicate that you already have an active [employer] account. You can access your account anytime at [email or web address] Did you know that you can access your hospital and ER discharge instructions at any time in [employer]? You can also review all of your test results from your hospital stay or ER visit. Additional Information If you have questions, please visit the Frequently Asked Questions section of the [employer] website at [email or web address] Remember, [employer] is NOT to be used for urgent needs. For medical emergencies, dial 911. Now available from your iPhone and Android! General Information Please provide this summary of care documentation to your next provider. Patient Signature:  ____________________________________________________________ Date:  ____________________________________________________________  
  
Netta Corea Provider Signature:  ____________________________________________________________ Date:  ____________________________________________________________

## 2017-08-25 NOTE — IP AVS SNAPSHOT
[address] [facility] 
[phone] Patient: Morelia Pennington MRN: [id number] QJF:14/02/7469 Current Discharge Medication List  
  
Notice Cannot display patient medications because the patient has not yet been checked in.

## 2017-08-25 NOTE — H&P
Radiology History and Physical    Patient: Caryle Hail 78 y.o. male       Chief Complaint: No chief complaint on file. 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) - surgery    Cancer ([address]     Prostate Cancer    Carotid artery occlusion     reports 100% occlusion on one side and 55-70% blockage in the over side (this has not changed in at least 5-6 yrs)/has doppler study on neck 2 times a yr    Chronic obstructive pulmonary disease (HCC)     Chronic pain     r/t arthritis    Congestive heart failure ([address]     Hyperlipidemia     Hypertension     S/P CABG x 3 12/6/2012     Family History   Problem Relation Age of Onset    Heart Disease Mother     Hypertension Mother     Diabetes Mother     Psychiatric Disorder Father     Asthma Daughter     Anesth Problems Neg Hx      Social History     Social History    Marital status:      Spouse name: N/A    Number of children: N/A    Years of education: N/A     Occupational History    Not on file. Social History Main Topics    Smoking status: Former Smoker     Packs/day: 1.50     Years: 50.00     Quit date: 12/6/2012    Smokeless tobacco: Former User    Alcohol use 12.0 oz/week     20 Cans of beer per week    Drug use: Yes     Special: Prescription, OTC    Sexual activity: Not on file     Other Topics Concern    Not on file     Social History Narrative       Allergies:    Allergies   Allergen Reactions    Plavix [Clopidogrel] Rash       Current Medications:  Current Outpatient Prescriptions   Medication Sig    ondansetron (ZOFRAN ODT) 4 mg disintegrating tablet Take 1 Tab by mouth every eight (8) hours as needed for Nausea.  lidocaine-prilocaine (EMLA) topical cream Apply  to affected area as needed for Pain.  HYDROcodone-acetaminophen (NORCO) 5-325 mg per tablet Take 1-2 Tabs by mouth every four (4) hours as needed for Pain. Max Daily Amount: 12 Tabs.  HYDROcodone-acetaminophen (NORCO) 5-325 mg per tablet Take 1-2 Tabs by mouth every four (4) hours as needed. Max Daily Amount: 12 Tabs.  aspirin 81 mg chewable tablet Take 81 mg by mouth daily.  sildenafil citrate (VIAGRA) 50 mg tablet Take  by mouth as needed. Indications: unsure of dosage    atorvastatin (LIPITOR) 40 mg tablet Take 40 mg by mouth daily.  lisinopril (PRINIVIL, ZESTRIL) 5 mg tablet Take 5 mg by mouth daily.  MULTIVITAMIN PO Take  by mouth. For men 48 +. Takes one po every other day.  metoprolol succinate (TOPROL-XL) 25 mg XL tablet Take 25 mg by mouth daily.  acetaminophen (TYLENOL EXTRA STRENGTH) 500 mg tablet Take 500 mg by mouth as needed for Pain.      Current Facility-Administered Medications   Medication Dose Route Frequency    fentaNYL citrate (PF) injection 100 mcg  100 mcg IntraVENous RAD PRN    midazolam (VERSED) injection 5 mg  5 mg IntraVENous RAD PRN     Facility-Administered Medications Ordered in Other Encounters   Medication Dose Route Frequency    [START ON 8/30/2017] oxaliplatin (ELOXATIN) 175 mg in dextrose 5% 250 mL, overfill volume 25 mL chemo infusion  175 mg IntraVENous ONCE    [START ON 8/30/2017] leucovorin (WELLCOVORIN) 830 mg in dextrose 5% 250 mL, overfill volume 25 mL IVPB  830 mg IntraVENous ONCE    [START ON 8/30/2017] fluorouracil (ADRUCIL) chemo syringe 830 mg  830 mg IntraVENous ONCE    [START ON 8/30/2017] fluorouracil (ADRUCIL) 4,990 mg in 0.9% sodium chloride 101.7 mL CADD Cassette  4,990 mg IntraVENous ONCE    [START ON 8/30/2017] dextrose 5% infusion  25 mL/hr IntraVENous CONTINUOUS        Physical Exam:  Blood pressure 108/71, pulse 77, temperature 97.8 °F (36.6 °C), resp. rate 20, height 5' 10\" (1.778 m), weight 89.4 kg (197 lb), SpO2 96 %. LUNG: clear to auscultation bilaterally, HEART: regular rate and rhythm      Alerts:    Hospital Problems  Date Reviewed: 8/17/2017    None          Laboratory:    No results for input(s): HGB, HCT, WBC, PLT, INR, BUN, CREA, K, CRCLT, HGBEXT, HCTEXT, PLTEXT in the last 72 hours. No lab exists for component: PTT, PT, INREXT      Plan of Care/Planned Procedure:  Risks, benefits, and alternatives reviewed with patient and he agrees to proceed with the procedure.        Dorothey Holter, MD

## 2017-08-30 NOTE — PROGRESS NOTES
0800 Pt arrived at [facility] and in no distress for C1 Folfox. Assessment completed, pt had port placed on 8/25/17, dressing removed today, steri-strips in place, incision looks clean. Site is bruised yellow. Accessed with 20 gauge calhoun, blood drawn. Briefly reviewed side effects, pt seen at Dr. Silvia Jacobs office. Pump documents reviewed with pt, information packet and spill kit given. Visit Vitals    /79    Pulse 93    Temp 98.1 °F (36.7 °C)    Resp 18    Ht 5' 8.9\" (1.75 m)    Wt 89.6 kg (197 lb 8 oz)    SpO2 95%    BMI 29.25 kg/m2       Medications received:  NS @ KVO  zofran 8 mg   Dexamethasone 12 mg IV  oxaliplatin 175 mg IV over 2 hours concurrently with leucovorin 830 mg   5FU 830 mg IV push  5FU 4990 mg via CADD pump set to run over 46 hours    Discharge instructions reviewed with pt and copy given. Pt instructed on how to turn pump off when it alarms it's complete. Visit Vitals    /80    Pulse 69    Temp 98.1 °F (36.7 °C)    Resp 18    Ht 5' 8.9\" (1.75 m)    Wt 89.6 kg (197 lb 8 oz)    SpO2 95%    BMI 29.25 kg/m2       1235 Tolerated treatment well, no adverse reaction noted. D/Cd from [facility] and in no distress accompanied by friend. Next appt Fri for pump d/c. Recent Results (from the past 8 hour(s))   CBC WITH 3 PART DIFF    Collection Time: 08/30/17  8:12 AM   Result Value Ref Range    WBC 6.7 4.1 - 11.1 K/uL    RBC 3.81 (L) 4.10 - 5.70 M/uL    HGB 11.6 (L) 12.1 - 17.0 g/dL    HCT 34.6 (L) 36.6 - 50.3 %    MCV 90.8 80.0 - 99.0 FL    MCH 30.4 26.0 - 34.0 PG    MCHC 33.5 30.0 - 36.5 g/dL    RDW 14.2 11.8 - 15.8 %    PLATELET 304 313 - 425 K/uL    NEUTROPHILS 52 32 - 75 %    MIXED CELLS 12 3.2 - 16.9 %    LYMPHOCYTES 36 12 - 49 %    ABS. NEUTROPHILS 3.5 1.8 - 8.0 K/UL    ABS. MIXED CELLS 0.8 0.2 - 1.2 K/uL    ABS.  LYMPHOCYTES 2.4 0.8 - 3.5 K/UL    DF AUTOMATED     METABOLIC PANEL, BASIC    Collection Time: 08/30/17  8:12 AM   Result Value Ref Range    Sodium 137 136 - 145 mmol/L    Potassium 4.2 3.5 - 5.1 mmol/L    Chloride 102 97 - 108 mmol/L    CO2 29 21 - 32 mmol/L    Anion gap 6 5 - 15 mmol/L    Glucose 93 65 - 100 mg/dL    BUN 14 6 - 20 MG/DL    Creatinine 0.98 0.70 - 1.30 MG/DL    BUN/Creatinine ratio 14 12 - 20      GFR est AA >60 >60 ml/min/1.73m2    GFR est non-AA >60 >60 ml/min/1.73m2    Calcium 8.8 8.5 - 10.1 MG/DL   HEPATIC FUNCTION PANEL    Collection Time: 08/30/17  8:12 AM   Result Value Ref Range    Protein, total 7.6 6.4 - 8.2 g/dL    Albumin 3.8 3.5 - 5.0 g/dL    Globulin 3.8 2.0 - 4.0 g/dL    A-G Ratio 1.0 (L) 1.1 - 2.2      Bilirubin, total 0.4 0.2 - 1.0 MG/DL    Bilirubin, direct <0.1 0.0 - 0.2 MG/DL    Alk.  phosphatase 60 45 - 117 U/L    AST (SGOT) 27 15 - 37 U/L    ALT (SGPT) 32 12 - 78 U/L   CEA    Collection Time: 08/30/17  8:12 AM   Result Value Ref Range    CEA 0.9 ng/mL

## 2017-08-30 NOTE — PROGRESS NOTES
Progress Note      Patient: Trish Xavier MRN: [id number]  SSN: [id number]    YOB: 1950  Age: 78 y.o. Sex: male        Diagnosis:     1. 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 mFOLFOX - cycle 1 day 1    Subjective:      Trish Xavier is a 78 y.o. male with new new diagnosis of metastatic cecal carcinoma. He presented to the ER with worsening abdominal pain for 5 days. He was recognized to have bowel obstruction and underwent laparatomy with right hemicolectomy. The pathology shows a diagnosis of poorly differentiated carcinoma of the cecum. He is recovering from this surgery. 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 Petersen. He had a radical prostatectomy few months ago by Dr. Jones Blunt. He suffers with CAD and had a CABG in the remote past. He is doing well. Appetite is good. He is accompanied by his girlfriend. Today he is starting systemic chemotherapy. Review of Systems:    Constitutional: negative  Eyes: negative  Ears, Nose, Mouth, Throat, and Face: negative  Respiratory: negative  Cardiovascular: negative  Gastrointestinal: negative  Genitourinary:negative  Integument/Breast: negative  Hematologic/Lymphatic: negative  Musculoskeletal:negative  Neurological: negative     Prior to Admission medications    Medication Sig Start Date End Date Taking? Authorizing Provider   ondansetron (ZOFRAN ODT) 4 mg disintegrating tablet Take 1 Tab by mouth every eight (8) hours as needed for Nausea. 8/16/17  Yes Antonino Bolaños MD   lidocaine-prilocaine (EMLA) topical cream Apply  to affected area as needed for Pain. 8/16/17  Yes Antonino Bolaños MD   HYDROcodone-acetaminophen [facility]) 5-325 mg per tablet Take 1-2 Tabs by mouth every four (4) hours as needed for Pain. Max Daily Amount: 12 Tabs.  8/8/17  Yes Mary Lou Anders MD   HYDROcodone-acetaminophen [facility]) 5-325 mg per tablet Take 1-2 Tabs by mouth every four (4) hours as needed. Max Daily Amount: 12 Tabs. 8/1/17  Yes Shilpi Castanon MD   aspirin 81 mg chewable tablet Take 81 mg by mouth daily. Yes Historical Provider   sildenafil citrate (VIAGRA) 50 mg tablet Take  by mouth as needed. Indications: unsure of dosage   Yes Historical Provider   atorvastatin (LIPITOR) 40 mg tablet Take 40 mg by mouth daily. Yes Historical Provider   lisinopril (PRINIVIL, ZESTRIL) 5 mg tablet Take 5 mg by mouth daily. Yes Historical Provider   MULTIVITAMIN PO Take  by mouth. For men 48 +. Takes one po every other day. Yes Historical Provider   metoprolol succinate (TOPROL-XL) 25 mg XL tablet Take 25 mg by mouth daily. Yes Historical Provider   acetaminophen (TYLENOL EXTRA STRENGTH) 500 mg tablet Take 500 mg by mouth as needed for Pain. Yes Historical Provider              Allergies   Allergen Reactions    Plavix [Clopidogrel] Rash           Objective:     Vitals:    08/30/17 0856   BP: 132/81   Pulse: 68   Resp: 18   Temp: 98.5 °F (36.9 °C)   TempSrc: Oral   SpO2: 97%   Weight: 197 lb (89.4 kg)   Height: 5' 8\" (1.727 m)            Physical Exam:    GENERAL: alert, cooperative, no distress, appears stated age  EYE: negative  LYMPHATIC: Cervical, supraclavicular, and axillary nodes normal.   THROAT & NECK: normal and no erythema or exudates noted. LUNG: clear to auscultation bilaterally  HEART: regular rate and rhythm  ABDOMEN: soft, non-tender  EXTREMITIES:  no edema  SKIN: Normal.  NEUROLOGIC: negative        CT Results (most recent):    Results from Hospital Encounter encounter on 08/03/17   CT ABD PELV W CONT   Narrative INDICATION: paroxysmal severe R flank pain, recent ex-lap surgery  status post  right colectomy for cecal carcinoma 7/28/2017    COMPARISON: 7/25/2017    TECHNIQUE:   Following the uneventful intravenous administration of 100 cc Isovue-370, thin  axial images were obtained through the abdomen and pelvis.  Coronal and sagittal  reconstructions were generated. Oral contrast was not administered. CT dose  reduction was achieved through use of a standardized protocol tailored for this  examination and automatic exposure control for dose modulation. FINDINGS:   LUNG BASES: Calcified granuloma again noted in the right middle lobe base. LIVER: The small lucent lesion at the dome of the liver previously described is  again seen with surrounding enhancement. It is approximately 1.3 cm in size in  hepatic segment 8. In addition to this lesion there is a focal lesion along the  periphery of segment 8 with an enhancing rim. This is approximately 1.5 cm in  size. This is better visualized on the current examination. GALLBLADDER: Unremarkable. SPLEEN: No mass. PANCREAS: No mass or ductal dilatation. ADRENALS: Unremarkable. KIDNEYS: No mass, calculus, or hydronephrosis. GI: Since the previous examination there has been a right colectomy. The  ileocolic anastomosis is identified in the right abdomen. There is a slight  amount of edema in the peritoneal fat on the right, however this is well within  expected appearance for this point postoperative. There is a small amount of  ascites at the tip of the right lobe of the liver and in the paracolic gutter in  this region. Again this is expected postoperatively. There is no evidence of  bowel obstruction. APPENDIX: Absent  PERITONEUM: Ascites is also noted in the pelvis which is minimal. There is no  free intraperitoneal air. RETROPERITONEUM: No lymphadenopathy or aortic aneurysm. URINARY BLADDER: No mass or calculus. PELVIS: The cystic structure in the extraperitoneal region on the left adjacent  to the hip is again noted. This is unchanged from the previous study. There is  no surrounding edema. Total hip replacement again noted on the left. BONES: No destructive bone lesion. ADDITIONAL COMMENTS: N/A         Impression IMPRESSION:    1. Status post right colectomy.  No complication identified secondary to this. There is no abscess or abnormal fluid collection other than expected  postoperative minimal fluid in this region. 2. The appearance of the liver has changed slightly. There is now a new lesion  identified along the surface of the right lobe of the liver laterally. The  previously described lesion in the dome of the liver is again noted as well as. The possibility of metastatic disease should be considered in light of the  surgical findings. 3. The cystic structure in the extraperitoneal region extending through the  inguinal canal into the soft tissues anterior to the acetabulum on the left  unchanged from the previous exam..                Lab Results   Component Value Date/Time    WBC 6.7 08/30/2017 08:12 AM    HGB 11.6 08/30/2017 08:12 AM    HCT 34.6 08/30/2017 08:12 AM    PLATELET 141 09/54/2353 08:12 AM    MCV 90.8 08/30/2017 08:12 AM       Lab Results   Component Value Date/Time    Sodium 137 08/30/2017 08:12 AM    Potassium 4.2 08/30/2017 08:12 AM    Chloride 102 08/30/2017 08:12 AM    CO2 29 08/30/2017 08:12 AM    Anion gap 6 08/30/2017 08:12 AM    Glucose 93 08/30/2017 08:12 AM    BUN 14 08/30/2017 08:12 AM    Creatinine 0.98 08/30/2017 08:12 AM    BUN/Creatinine ratio 14 08/30/2017 08:12 AM    GFR est AA >60 08/30/2017 08:12 AM    GFR est non-AA >60 08/30/2017 08:12 AM    Calcium 8.8 08/30/2017 08:12 AM    Bilirubin, total 0.4 08/30/2017 08:12 AM    AST (SGOT) 27 08/30/2017 08:12 AM    Alk. phosphatase 60 08/30/2017 08:12 AM    Protein, total 7.6 08/30/2017 08:12 AM    Albumin 3.8 08/30/2017 08:12 AM    Globulin 3.8 08/30/2017 08:12 AM    A-G Ratio 1.0 08/30/2017 08:12 AM    ALT (SGPT) 32 08/30/2017 08:12 AM             Assessment:     1.  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 Plan:        > Starting mFOLFOX chemotherapy Cycle 1 day 1  > Plan to obtain CT after 6 cycles  > follow up in 2 weeks       I saw the patient in conjunction with DEIDRE Martínez. Signed by: Michelle Rene MD                     August 30, 2017        CC. Lizabeth Cerda MD  CC.  Yoly Cardona MD

## 2017-08-30 NOTE — PROGRESS NOTES
Problem: Patient Education: Go to Education Activity  Goal: Patient/Family Education  Outcome: Progressing Towards Goal  Reviewed side effects of chemo and symptoms to report to physician

## 2017-08-30 NOTE — DISCHARGE INSTRUCTIONS

## 2017-08-30 NOTE — PROGRESS NOTES
Caryle Hail is a 78 y.o. male here for follow up of adenocarcinoma of cecum. Mr. Yves Coe has a reminder for a \"due or due soon\" health maintenance. I have asked that he contact his primary care provider for follow-up on this health maintenance.

## 2017-09-01 NOTE — PROGRESS NOTES
8000 [facility] Note:  Arrived - 8283    Visit Vitals    /74 (BP 1 Location: Left arm, BP Patient Position: Sitting)    Pulse 60    Temp 97.6 °F (36.4 °C)    Resp 18       Assessment - unchanged. Port accessed & flushed per protocol w/o difficulty. Glass needle removed. 1220 - Tolerated well. Pt denies any acute problems/changes. Discharged from [facility] ambulatory. No distress.  Next appt: 9/11/17 at 1pm.

## 2017-09-11 NOTE — PROGRESS NOTES
[facility]    [facility]89 Pt arrived to [facility] ambulatory and in no distress for Pre-chemo Labs. Denies any new complaints. Labs drawn. VSS. See [facility]. Medication given:  none    1320 Discharged home ambulatory and in no distress. Tolerated procedure well. Next appointment 9/12 @ 0800 for chemo.

## 2017-09-12 NOTE — PROGRESS NOTES
0810 Pt arrived at [facility] ambulatory and in no distress for C2 folfox. Assessment completed, pt reports constipation with hemorrhoids after last treatment, has significant pain with passing stool. Called on call physician and saw PCP, has instructions for miralax use and a cream for topical use. Port accessed, blood return positive. Labs done 9/11/17    Visit Vitals    /74 (BP 1 Location: Left arm, BP Patient Position: Sitting)    Pulse 75    Temp 98 °F (36.7 °C)    Resp 18    Wt 89.5 kg (197 lb 6.4 oz)    SpO2 97%    BMI 30.01 kg/m2       Medications received:  D5W @ kvo  zofran 8 mg IV  Dexamethasone 12 mg IV  Oxaliplatin  175 mg IV over 2 hours concurrently with leucovorin 830 mg  5FU bolus 830 mg IV push  5FU continuous infusion 4990 mg IV set to run over 46 hours. Visit Vitals    /79    Pulse 69    Temp 97.7 °F (36.5 °C)    Resp 20    Ht 5' 10\" (1.778 m)    Wt 89.5 kg (197 lb 6.4 oz)    SpO2 97%    BMI 28.32 kg/m2     1145 Tolerated treatment well, no adverse reaction noted. D/Cd from [facility] ambulatory and in no distress.   Next appt Thursday

## 2017-09-14 NOTE — PROGRESS NOTES
1000 Pt arrived at [facility] ambulatory and in no distress for Pump removal.  Assessment unremarkable, no new complaints voiced. Visit Vitals    /70 (BP 1 Location: Left arm, BP Patient Position: At rest;Sitting)    Pulse 70    Temp 98 °F (36.7 °C)    Resp 18    SpO2 96%       Medications received:  NS Flush  Heparin Flush    1010 Port flushed per policy and de-accessed. D/Cd from [facility] ambulatory and in no distress accompanied by self.   Next appt 9/25/17

## 2017-09-14 NOTE — PROGRESS NOTES
Identified pt with two pt identifiers(name and ). Reviewed record in preparation for visit and have obtained necessary documentation. Chief Complaint   Patient presents with    Colon Cancer      77 yo here today for a f/u after having the pump removed. States he feels like \"crap\". Denies pain. Health Maintenance Due   Topic    Hepatitis C Screening     DTaP/Tdap/Td series (1 - Tdap)    FOBT Q 1 YEAR AGE 50-75     ZOSTER VACCINE AGE 60>     GLAUCOMA SCREENING Q2Y     MEDICARE YEARLY EXAM     INFLUENZA AGE 9 TO ADULT     HM reviewed w/patient    Coordination of Care Questionnaire:  :   1) Have you been to an emergency room, urgent care clinic since your last visit? no   Hospitalized since your last visit? no             2. Have seen or consulted any other health care provider since your last visit? NO  If yes, where when, and reason for visit? 3) Do you have an Advanced Directive/ Living Will in place? YES  If yes, do we have a copy on file YES    Patient is accompanied by self.

## 2017-09-15 NOTE — PROGRESS NOTES
Progress Note      Patient: Carrie Galvan MRN: [id number]  SSN: [id number]    YOB: 1950  Age: 78 y.o. Sex: male        Diagnosis:     1. 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 mFOLFOX - cycle 2 day 3    Subjective:      Carrie Galvan is a 78 y.o. male with a diagnosis of metastatic cecal carcinoma. He presented to the ER with worsening abdominal pain for 5 days. He was recognized to have bowel obstruction and underwent laparatomy with right hemicolectomy. The pathology shows a diagnosis of poorly differentiated carcinoma of the cecum. He is recovering from this surgery. 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 Petersen. He had a radical prostatectomy few months ago by Dr. Caryle Boatman. He suffers with CAD and had a CABG in the remote past. He is doing well. Appetite is good. He is accompanied by his girlfriend. He is receiving systemic chemotherapy. He experienced constipation after the first cycle of systemic therapy. It improved with stool softeners and laxative. Review of Systems:    Constitutional: negative  Eyes: negative  Ears, Nose, Mouth, Throat, and Face: negative  Respiratory: negative  Cardiovascular: negative  Gastrointestinal: negative  Genitourinary:negative  Integument/Breast: negative  Hematologic/Lymphatic: negative  Musculoskeletal:negative  Neurological: negative       Prior to Admission medications    Medication Sig Start Date End Date Taking? Authorizing Provider   ondansetron (ZOFRAN ODT) 4 mg disintegrating tablet Take 1 Tab by mouth every eight (8) hours as needed for Nausea. 8/16/17  Yes Mark Wood MD   lidocaine-prilocaine (EMLA) topical cream Apply  to affected area as needed for Pain. 8/16/17  Yes Mark Wood MD   HYDROcodone-acetaminophen [facility]) 5-325 mg per tablet Take 1-2 Tabs by mouth every four (4) hours as needed for Pain. Max Daily Amount: 12 Tabs. 8/8/17  Yes Dayanara Velarde MD   HYDROcodone-acetaminophen [facility]) 5-325 mg per tablet Take 1-2 Tabs by mouth every four (4) hours as needed. Max Daily Amount: 12 Tabs. 8/1/17  Yes Tara Francois MD   aspirin 81 mg chewable tablet Take 81 mg by mouth daily. Yes Historical Provider   sildenafil citrate (VIAGRA) 50 mg tablet Take  by mouth as needed. Indications: unsure of dosage   Yes Historical Provider   atorvastatin (LIPITOR) 40 mg tablet Take 40 mg by mouth daily. Yes Historical Provider   lisinopril (PRINIVIL, ZESTRIL) 5 mg tablet Take 5 mg by mouth daily. Yes Historical Provider   MULTIVITAMIN PO Take  by mouth. For men 48 +. Takes one po every other day. Yes Historical Provider   metoprolol succinate (TOPROL-XL) 25 mg XL tablet Take 25 mg by mouth daily. Yes Historical Provider   acetaminophen (TYLENOL EXTRA STRENGTH) 500 mg tablet Take 500 mg by mouth as needed for Pain. Yes Historical Provider              Allergies   Allergen Reactions    Plavix [Clopidogrel] Rash           Objective:     Vitals:    09/14/17 1506   BP: 138/77   Pulse: 73   Resp: 18   SpO2: 94%   Weight: 197 lb 3.2 oz (89.4 kg)   Height: 5' 10\" (1.778 m)            Physical Exam:    GENERAL: alert, cooperative, no distress, appears stated age  EYE: negative  LYMPHATIC: Cervical, supraclavicular, and axillary nodes normal.   THROAT & NECK: normal and no erythema or exudates noted.    LUNG: clear to auscultation bilaterally  HEART: regular rate and rhythm  ABDOMEN: soft, non-tender  EXTREMITIES:  no edema  SKIN: Normal.  NEUROLOGIC: negative        CT Results (most recent):    Results from Hospital Encounter encounter on 08/03/17   CT ABD PELV W CONT   Narrative INDICATION: paroxysmal severe R flank pain, recent ex-lap surgery  status post  right colectomy for cecal carcinoma 7/28/2017    COMPARISON: 7/25/2017    TECHNIQUE:   Following the uneventful intravenous administration of 100 cc Isovue-370, thin  axial images were obtained through the abdomen and pelvis. Coronal and sagittal  reconstructions were generated. Oral contrast was not administered. CT dose  reduction was achieved through use of a standardized protocol tailored for this  examination and automatic exposure control for dose modulation. FINDINGS:   LUNG BASES: Calcified granuloma again noted in the right middle lobe base. LIVER: The small lucent lesion at the dome of the liver previously described is  again seen with surrounding enhancement. It is approximately 1.3 cm in size in  hepatic segment 8. In addition to this lesion there is a focal lesion along the  periphery of segment 8 with an enhancing rim. This is approximately 1.5 cm in  size. This is better visualized on the current examination. GALLBLADDER: Unremarkable. SPLEEN: No mass. PANCREAS: No mass or ductal dilatation. ADRENALS: Unremarkable. KIDNEYS: No mass, calculus, or hydronephrosis. GI: Since the previous examination there has been a right colectomy. The  ileocolic anastomosis is identified in the right abdomen. There is a slight  amount of edema in the peritoneal fat on the right, however this is well within  expected appearance for this point postoperative. There is a small amount of  ascites at the tip of the right lobe of the liver and in the paracolic gutter in  this region. Again this is expected postoperatively. There is no evidence of  bowel obstruction. APPENDIX: Absent  PERITONEUM: Ascites is also noted in the pelvis which is minimal. There is no  free intraperitoneal air. RETROPERITONEUM: No lymphadenopathy or aortic aneurysm. URINARY BLADDER: No mass or calculus. PELVIS: The cystic structure in the extraperitoneal region on the left adjacent  to the hip is again noted. This is unchanged from the previous study. There is  no surrounding edema. Total hip replacement again noted on the left. BONES: No destructive bone lesion.   ADDITIONAL COMMENTS: N/A         Impression IMPRESSION:    1. Status post right colectomy. No complication identified secondary to this. There is no abscess or abnormal fluid collection other than expected  postoperative minimal fluid in this region. 2. The appearance of the liver has changed slightly. There is now a new lesion  identified along the surface of the right lobe of the liver laterally. The  previously described lesion in the dome of the liver is again noted as well as. The possibility of metastatic disease should be considered in light of the  surgical findings. 3. The cystic structure in the extraperitoneal region extending through the  inguinal canal into the soft tissues anterior to the acetabulum on the left  unchanged from the previous exam..                Lab Results   Component Value Date/Time    WBC 4.6 09/11/2017 01:07 PM    HGB 10.9 09/11/2017 01:07 PM    HCT 31.5 09/11/2017 01:07 PM    PLATELET 371 33/43/1393 01:07 PM    MCV 88.2 09/11/2017 01:07 PM       Lab Results   Component Value Date/Time    Sodium 135 09/11/2017 01:07 PM    Potassium 3.7 09/11/2017 01:07 PM    Chloride 103 09/11/2017 01:07 PM    CO2 26 09/11/2017 01:07 PM    Anion gap 6 09/11/2017 01:07 PM    Glucose 105 09/11/2017 01:07 PM    BUN 12 09/11/2017 01:07 PM    Creatinine 0.95 09/11/2017 01:07 PM    BUN/Creatinine ratio 13 09/11/2017 01:07 PM    GFR est AA >60 09/11/2017 01:07 PM    GFR est non-AA >60 09/11/2017 01:07 PM    Calcium 9.0 09/11/2017 01:07 PM    Bilirubin, total 0.4 08/30/2017 08:12 AM    AST (SGOT) 27 08/30/2017 08:12 AM    Alk. phosphatase 60 08/30/2017 08:12 AM    Protein, total 7.6 08/30/2017 08:12 AM    Albumin 3.8 08/30/2017 08:12 AM    Globulin 3.8 08/30/2017 08:12 AM    A-G Ratio 1.0 08/30/2017 08:12 AM    ALT (SGPT) 32 08/30/2017 08:12 AM             Assessment:     1.  Metastatic poorly differentiated adenocarcinoma of the cecum:                         T4a N2b M1a    > Signet ring histology  > Serosal penetration  > single hypodense lesion in the liver  > 9/14 LN +ve  > extracapsular spread  > Lymphovascular invasion present     ANJEL wild type  MSI stable    Plan is to administer 3 months of systemic chemotherapy, assess response and resect the liver if the disease does not progress    Receiving systemic chemotherapy   mFOLFOX chemotherapy Cycle 2 day 3    Tolerating treatment very well  Denies any side effects. A detailed system by system evaluation of side effect was performed to assess chemotherapy related toxicity. Blood counts are acceptable. Results reviewed with the patient      2. Constipation    Likely from anti-emetics  Now regular with stool-softener and laxative      3. Anemia, chemotherapy related    Observation    Plan:        > Continue mFOLFOX  > Plan to obtain CT after 6 cycles  > follow up in 2 weeks         Signed by: Stacie Clark MD                     September 15, 2017        CC. Peter Perla MD  CC.  Leticia Young MD

## 2017-09-25 NOTE — PROGRESS NOTES
[facility] Lab Draw Note:  Arrived - 7322    Visit Vitals    /77 (BP 1 Location: Left arm, BP Patient Position: Sitting)    Pulse 89    Temp 97.9 °F (36.6 °C)    Resp 18    SpO2 93%       Labs drawn peripherally from L AC arm -   Pt left at 1409 - Tolerated well. Pt denies any acute problems/changes. Discharged from [facility] ambulatory. No distress. See connect care for pending lab results.

## 2017-09-26 NOTE — PROGRESS NOTES
Problem: Chemotherapy Treatment  Goal: *Chemotherapy regimen followed  Outcome: Progressing Towards Goal  Pre meds given and chemotherapy initiated

## 2017-09-26 NOTE — PROGRESS NOTES
0800 pt arrived to [facility] ambulatory for C3. Assessment completed. Pt denies any distress or discomfort at this time. Port accessed with calhoun, blood return noted and flushed without dififculty. Labs noted from yesterday called PATRICIO Dennis and reviewed ANC of 1.3. Orders to continue with treatment. Pharmacy aware. Visit Vitals    /82 (BP 1 Location: Left arm, BP Patient Position: At rest)    Pulse 83    Temp 98 °F (36.7 °C)    Resp 18    Wt 89.4 kg (197 lb 3.2 oz)    BMI 28.3 kg/m2     0820 Zofran 8 mg IV given   0907 Decadron 12 mg IV given   D5 at Otoniel Hammed started. 2039 Oxaliplatin 175 mg IV started to run concurrent with Leucovorin 830 mg IV over 2 hours. Blood return noted  1145 Leucovorin completed. 1155 Oxaliplatin completed. Pt tolerated well. No reaction noted. 1210 5FU 830 mg pushed over 5 min. Blood return noted throughout push. 1215 5FU continuous pump started to run at 2.2 ml/hr for a total of 4990 mg over 46 hours. Blood return noted. Visit Vitals    /87    Pulse 89    Temp 98 °F (36.7 °C)    Resp 18    Wt 89.4 kg (197 lb 3.2 oz)    BMI 28.3 kg/m2       1220 Port secured with pump. Pt denies any distress or discomfort at this time. Pt discharged home ambulatory with next apt.

## 2017-09-29 NOTE — PROGRESS NOTES
Progress Note      Patient: Negin Rai MRN: [id number]  SSN: [id number]    YOB: 1950  Age: 78 y.o. Sex: male        Diagnosis:     1. 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 mFOLFOX - cycle 3 day 3    Subjective:      Negin Rai is a 78 y.o. male with a diagnosis of metastatic cecal carcinoma. He presented to the ER with worsening abdominal pain for 5 days. He was recognized to have bowel obstruction and underwent laparatomy with right hemicolectomy. The pathology shows a diagnosis of poorly differentiated carcinoma of the cecum. He is recovering from this surgery. 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 Petersen. He had a radical prostatectomy few months ago by Dr. Anita Alfaro. He suffers with CAD and had a CABG in the remote past. He is doing well. Appetite is good. He is accompanied by his girlfriend. He is receiving systemic chemotherapy. He is doing well. Review of Systems:    Constitutional: negative  Eyes: negative  Ears, Nose, Mouth, Throat, and Face: negative  Respiratory: negative  Cardiovascular: negative  Gastrointestinal: negative  Genitourinary:negative  Integument/Breast: negative  Hematologic/Lymphatic: negative  Musculoskeletal:negative  Neurological: negative       Prior to Admission medications    Medication Sig Start Date End Date Taking? Authorizing Provider   ondansetron (ZOFRAN ODT) 4 mg disintegrating tablet Take 1 Tab by mouth every eight (8) hours as needed for Nausea. 8/16/17  Yes Isaias Gunter MD   lidocaine-prilocaine (EMLA) topical cream Apply  to affected area as needed for Pain. 8/16/17  Yes Isaias Gunter MD   aspirin 81 mg chewable tablet Take 81 mg by mouth daily. Yes Historical Provider   sildenafil citrate (VIAGRA) 50 mg tablet Take  by mouth as needed.  Indications: unsure of dosage   Yes Historical Provider atorvastatin (LIPITOR) 40 mg tablet Take 40 mg by mouth daily. Yes Historical Provider   lisinopril (PRINIVIL, ZESTRIL) 5 mg tablet Take 5 mg by mouth daily. Yes Historical Provider   MULTIVITAMIN PO Take  by mouth. For men 48 +. Takes one po every other day. Yes Historical Provider   acetaminophen (TYLENOL EXTRA STRENGTH) 500 mg tablet Take 500 mg by mouth as needed for Pain. Yes Historical Provider   HYDROcodone-acetaminophen (NORCO) 5-325 mg per tablet Take 1-2 Tabs by mouth every four (4) hours as needed for Pain. Max Daily Amount: 12 Tabs. 8/8/17   Eliza Park MD   HYDROcodone-acetaminophen [facility]) 5-325 mg per tablet Take 1-2 Tabs by mouth every four (4) hours as needed. Max Daily Amount: 12 Tabs. 8/1/17   Regis Libman, MD   metoprolol succinate (TOPROL-XL) 25 mg XL tablet Take 25 mg by mouth daily. Historical Provider              Allergies   Allergen Reactions    Plavix [Clopidogrel] Rash           Objective:     Vitals:    09/29/17 1018   BP: 175/82   Pulse: 73   Resp: 18   Temp: 97.7 °F (36.5 °C)   SpO2: 95%   Weight: 198 lb 6.4 oz (90 kg)   Height: 5' 10\" (1.778 m)            Physical Exam:    GENERAL: alert, cooperative, no distress, appears stated age  EYE: negative  LYMPHATIC: Cervical, supraclavicular, and axillary nodes normal.   THROAT & NECK: normal and no erythema or exudates noted. LUNG: clear to auscultation bilaterally  HEART: regular rate and rhythm  ABDOMEN: soft, non-tender  EXTREMITIES:  no edema  SKIN: Normal.  NEUROLOGIC: negative        CT Results (most recent):    Results from Hospital Encounter encounter on 08/03/17   CT ABD PELV W CONT   Narrative INDICATION: paroxysmal severe R flank pain, recent ex-lap surgery  status post  right colectomy for cecal carcinoma 7/28/2017    COMPARISON: 7/25/2017    TECHNIQUE:   Following the uneventful intravenous administration of 100 cc Isovue-370, thin  axial images were obtained through the abdomen and pelvis.  Coronal and sagittal  reconstructions were generated. Oral contrast was not administered. CT dose  reduction was achieved through use of a standardized protocol tailored for this  examination and automatic exposure control for dose modulation. FINDINGS:   LUNG BASES: Calcified granuloma again noted in the right middle lobe base. LIVER: The small lucent lesion at the dome of the liver previously described is  again seen with surrounding enhancement. It is approximately 1.3 cm in size in  hepatic segment 8. In addition to this lesion there is a focal lesion along the  periphery of segment 8 with an enhancing rim. This is approximately 1.5 cm in  size. This is better visualized on the current examination. GALLBLADDER: Unremarkable. SPLEEN: No mass. PANCREAS: No mass or ductal dilatation. ADRENALS: Unremarkable. KIDNEYS: No mass, calculus, or hydronephrosis. GI: Since the previous examination there has been a right colectomy. The  ileocolic anastomosis is identified in the right abdomen. There is a slight  amount of edema in the peritoneal fat on the right, however this is well within  expected appearance for this point postoperative. There is a small amount of  ascites at the tip of the right lobe of the liver and in the paracolic gutter in  this region. Again this is expected postoperatively. There is no evidence of  bowel obstruction. APPENDIX: Absent  PERITONEUM: Ascites is also noted in the pelvis which is minimal. There is no  free intraperitoneal air. RETROPERITONEUM: No lymphadenopathy or aortic aneurysm. URINARY BLADDER: No mass or calculus. PELVIS: The cystic structure in the extraperitoneal region on the left adjacent  to the hip is again noted. This is unchanged from the previous study. There is  no surrounding edema. Total hip replacement again noted on the left. BONES: No destructive bone lesion. ADDITIONAL COMMENTS: N/A         Impression IMPRESSION:    1. Status post right colectomy.  No complication identified secondary to this. There is no abscess or abnormal fluid collection other than expected  postoperative minimal fluid in this region. 2. The appearance of the liver has changed slightly. There is now a new lesion  identified along the surface of the right lobe of the liver laterally. The  previously described lesion in the dome of the liver is again noted as well as. The possibility of metastatic disease should be considered in light of the  surgical findings. 3. The cystic structure in the extraperitoneal region extending through the  inguinal canal into the soft tissues anterior to the acetabulum on the left  unchanged from the previous exam..                Lab Results   Component Value Date/Time    WBC 4.2 09/25/2017 02:03 PM    HGB 11.8 09/25/2017 02:03 PM    HCT 33.9 09/25/2017 02:03 PM    PLATELET 875 53/75/3394 02:03 PM    MCV 88.1 09/25/2017 02:03 PM       Lab Results   Component Value Date/Time    Sodium 136 09/25/2017 02:03 PM    Potassium 3.8 09/25/2017 02:03 PM    Chloride 103 09/25/2017 02:03 PM    CO2 25 09/25/2017 02:03 PM    Anion gap 8 09/25/2017 02:03 PM    Glucose 118 09/25/2017 02:03 PM    BUN 15 09/25/2017 02:03 PM    Creatinine 1.31 09/25/2017 02:03 PM    BUN/Creatinine ratio 11 09/25/2017 02:03 PM    GFR est AA >60 09/25/2017 02:03 PM    GFR est non-AA 55 09/25/2017 02:03 PM    Calcium 8.8 09/25/2017 02:03 PM    Bilirubin, total 0.4 09/25/2017 02:03 PM    AST (SGOT) 44 09/25/2017 02:03 PM    Alk. phosphatase 70 09/25/2017 02:03 PM    Protein, total 7.7 09/25/2017 02:03 PM    Albumin 3.9 09/25/2017 02:03 PM    Globulin 3.8 09/25/2017 02:03 PM    A-G Ratio 1.0 09/25/2017 02:03 PM    ALT (SGPT) 43 09/25/2017 02:03 PM             Assessment:     1.  Metastatic poorly differentiated adenocarcinoma of the cecum:                         T4a N2b M1a    > Signet ring histology  > Serosal penetration  > single hypodense lesion in the liver  > 9/14 LN +ve  > extracapsular spread  > Lymphovascular invasion present     ANJEL wild type  MSI stable    Plan is to administer 3 months of systemic chemotherapy, assess response and resect the liver if the disease does not progress    Receiving systemic chemotherapy   mFOLFOX chemotherapy Cycle 3 day 3    Tolerating treatment very well  Denies any side effects. A detailed system by system evaluation of side effect was performed to assess chemotherapy related toxicity. Blood counts are acceptable. Results reviewed with the patient      2. Anemia, chemotherapy related    Observation    Plan:        > Continue mFOLFOX  > Plan to obtain CT after 6 cycles  > follow up in 2 weeks         Signed by: Lisa Elizalde MD                     September 29, 2017        CC. Lucero Au MD  CC.  Roxana Ramos MD

## 2017-10-09 NOTE — PROGRESS NOTES
[facility] Lab Draw Note:  Arrived - 6394    Visit Vitals    /83 (BP 1 Location: Left arm, BP Patient Position: Sitting)    Pulse 85    Temp 98.1 °F (36.7 °C)    Resp 18    Wt 90.2 kg (198 lb 12.8 oz)    BMI 28.52 kg/m2       Labs drawn peripherally from L AC arm -  Pt left at 1425 - Tolerated well. Pt denies any acute problems/changes. Discharged from [facility] ambulatory. No distress. See [facility] for pending lab results.

## 2017-10-10 NOTE — PROGRESS NOTES
Pt arrived to [facility] ambulatory for FOLFOX C4 in no acute distress at 0805.  Assessment unremarkable except fatigue. R chest port accessed without issue and positive blood return noted.  Labs obtained on 10/9/17. Visit Vitals    /73 (BP 1 Location: Left arm, BP Patient Position: Sitting)    Pulse 80    Temp 98.3 °F (36.8 °C)    Resp 18    Ht 5' 10\" (1.778 m)    Wt 91.1 kg (200 lb 12.8 oz)    SpO2 98%    BMI 28.81 kg/m2       The following medications administered:  D5 @ KVO  Zofran 8mg IVP  Decadron 12mg IVPB  Oxaliplatin 130mg IV over 2 hours  Leucovorin 830mg IV over 2 hours (run concurrently with Oxaliplatin)  5FU 830mg IVP  5FU 4990mg IV over 46 hours    Visit Vitals    /88 (BP 1 Location: Left arm, BP Patient Position: Sitting)    Pulse 77    Temp 98.3 °F (36.8 °C)    Resp 18    Ht 5' 10\" (1.778 m)    Wt 91.1 kg (200 lb 12.8 oz)    SpO2 98%    BMI 28.81 kg/m2       Pt tolerated treatment well.  No adverse reaction noted. Port flushed per policy and chemo pump placed.  Pt discharged ambulatory in no acute distress at 1220, accompanied by self. Next appointment 10/12/17 @ 1300.

## 2017-10-12 NOTE — PROGRESS NOTES
[facility] Short Visit Note:    0930 Pt arrived at [facility] [place] ambulatory and in no distress for pump removal.  No new complaints voiced except fatigue. Visit Vitals    /84 (BP 1 Location: Left arm, BP Patient Position: Sitting)    Pulse 72    Temp 98.6 °F (37 °C)    Resp 18    SpO2 97%       All chemo contents infused. Port flushed per policy and needle removed, 2x2 and paper tape placed. 0940 Tolerated treatment well, no adverse reaction noted. D/Cd from [facility] [place] ambulatory and in no distress accompanied by self. Next appt 10/23/17 @ 1400 for pre-chemo labs.

## 2017-10-13 NOTE — PROGRESS NOTES
Progress Note      Patient: Linda Lopez MRN: [id number]  SSN: [id number]    YOB: 1950  Age: 78 y.o. Sex: male        Diagnosis:     1. 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 mFOLFOX - cycle 4 day 3    Subjective:      Linda Lopez is a 78 y.o. male with a diagnosis of metastatic cecal carcinoma. He presented to the ER with worsening abdominal pain for 5 days. He was recognized to have bowel obstruction and underwent laparatomy with right hemicolectomy. The pathology shows a diagnosis of poorly differentiated carcinoma of the cecum. 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 Petersen. He had a radical prostatectomy few months ago by Dr. Sabino Pandey. He suffers with CAD and had a CABG in the remote past.     He is receiving systemic chemotherapy. He said he has been nauseated, but it is controlled with compazine. He continues to feel fatigued and he is planning on playing golf Sunday. Review of Systems:    Constitutional: fatigue  Eyes: negative  Ears, Nose, Mouth, Throat, and Face: negative  Respiratory: negative  Cardiovascular: negative  Gastrointestinal: nausea  Genitourinary:negative  Integument/Breast: negative  Hematologic/Lymphatic: negative  Musculoskeletal:negative  Neurological: negative       Prior to Admission medications    Medication Sig Start Date End Date Taking? Authorizing Provider   ondansetron (ZOFRAN ODT) 4 mg disintegrating tablet Take 1 Tab by mouth every eight (8) hours as needed for Nausea. 8/16/17  Yes Ramona Castro MD   lidocaine-prilocaine (EMLA) topical cream Apply  to affected area as needed for Pain. 8/16/17  Yes Ramona Castro MD   HYDROcodone-acetaminophen [facility]) 5-325 mg per tablet Take 1-2 Tabs by mouth every four (4) hours as needed for Pain. Max Daily Amount: 12 Tabs.  8/8/17  Yes Suri Hernandez MD   HYDROcodone-acetaminophen (NORCO) 5-325 mg per tablet Take 1-2 Tabs by mouth every four (4) hours as needed. Max Daily Amount: 12 Tabs. 8/1/17  Yes Audie Gongora MD   aspirin 81 mg chewable tablet Take 81 mg by mouth daily. Yes Historical Provider   sildenafil citrate (VIAGRA) 50 mg tablet Take  by mouth as needed. Indications: unsure of dosage   Yes Historical Provider   atorvastatin (LIPITOR) 40 mg tablet Take 40 mg by mouth daily. Yes Historical Provider   lisinopril (PRINIVIL, ZESTRIL) 5 mg tablet Take 5 mg by mouth daily. Yes Historical Provider   MULTIVITAMIN PO Take  by mouth. For men 48 +. Takes one po every other day. Yes Historical Provider   metoprolol succinate (TOPROL-XL) 25 mg XL tablet Take 25 mg by mouth daily. Yes Historical Provider   acetaminophen (TYLENOL EXTRA STRENGTH) 500 mg tablet Take 500 mg by mouth as needed for Pain. Yes Historical Provider        Allergies   Allergen Reactions    Plavix [Clopidogrel] Rash         Objective:     Vitals:    10/13/17 1023   BP: 157/82   Pulse: 63   Resp: 20   Temp: 98 °F (36.7 °C)   SpO2: 96%   Weight: 201 lb (91.2 kg)   Height: 5' 10\" (1.778 m)            Physical Exam:    GENERAL: alert, cooperative, no distress, appears stated age  EYE: negative  LYMPHATIC: Cervical, supraclavicular, and axillary nodes normal.   THROAT & NECK: normal and no erythema or exudates noted. LUNG: clear to auscultation bilaterally  HEART: regular rate and rhythm  ABDOMEN: soft, non-tender  EXTREMITIES:  no edema  SKIN: Normal.  NEUROLOGIC: negative        CT Results (most recent):    Results from Hospital Encounter encounter on 08/03/17   CT ABD PELV W CONT   Narrative INDICATION: paroxysmal severe R flank pain, recent ex-lap surgery  status post  right colectomy for cecal carcinoma 7/28/2017    COMPARISON: 7/25/2017    TECHNIQUE:   Following the uneventful intravenous administration of 100 cc Isovue-370, thin  axial images were obtained through the abdomen and pelvis.  Coronal and sagittal  reconstructions were generated. Oral contrast was not administered. CT dose  reduction was achieved through use of a standardized protocol tailored for this  examination and automatic exposure control for dose modulation. FINDINGS:   LUNG BASES: Calcified granuloma again noted in the right middle lobe base. LIVER: The small lucent lesion at the dome of the liver previously described is  again seen with surrounding enhancement. It is approximately 1.3 cm in size in  hepatic segment 8. In addition to this lesion there is a focal lesion along the  periphery of segment 8 with an enhancing rim. This is approximately 1.5 cm in  size. This is better visualized on the current examination. GALLBLADDER: Unremarkable. SPLEEN: No mass. PANCREAS: No mass or ductal dilatation. ADRENALS: Unremarkable. KIDNEYS: No mass, calculus, or hydronephrosis. GI: Since the previous examination there has been a right colectomy. The  ileocolic anastomosis is identified in the right abdomen. There is a slight  amount of edema in the peritoneal fat on the right, however this is well within  expected appearance for this point postoperative. There is a small amount of  ascites at the tip of the right lobe of the liver and in the paracolic gutter in  this region. Again this is expected postoperatively. There is no evidence of  bowel obstruction. APPENDIX: Absent  PERITONEUM: Ascites is also noted in the pelvis which is minimal. There is no  free intraperitoneal air. RETROPERITONEUM: No lymphadenopathy or aortic aneurysm. URINARY BLADDER: No mass or calculus. PELVIS: The cystic structure in the extraperitoneal region on the left adjacent  to the hip is again noted. This is unchanged from the previous study. There is  no surrounding edema. Total hip replacement again noted on the left. BONES: No destructive bone lesion. ADDITIONAL COMMENTS: N/A         Impression IMPRESSION:    1. Status post right colectomy.  No complication identified secondary to this. There is no abscess or abnormal fluid collection other than expected  postoperative minimal fluid in this region. 2. The appearance of the liver has changed slightly. There is now a new lesion  identified along the surface of the right lobe of the liver laterally. The  previously described lesion in the dome of the liver is again noted as well as. The possibility of metastatic disease should be considered in light of the  surgical findings. 3. The cystic structure in the extraperitoneal region extending through the  inguinal canal into the soft tissues anterior to the acetabulum on the left  unchanged from the previous exam..                Lab Results   Component Value Date/Time    WBC 3.6 10/09/2017 02:20 PM    HGB 11.5 10/09/2017 02:20 PM    HCT 33.7 10/09/2017 02:20 PM    PLATELET 292 21/58/6866 02:20 PM    MCV 90.1 10/09/2017 02:20 PM       Lab Results   Component Value Date/Time    Sodium 135 10/09/2017 02:20 PM    Potassium 3.9 10/09/2017 02:20 PM    Chloride 103 10/09/2017 02:20 PM    CO2 26 10/09/2017 02:20 PM    Anion gap 6 10/09/2017 02:20 PM    Glucose 113 10/09/2017 02:20 PM    BUN 15 10/09/2017 02:20 PM    Creatinine 1.12 10/09/2017 02:20 PM    BUN/Creatinine ratio 13 10/09/2017 02:20 PM    GFR est AA >60 10/09/2017 02:20 PM    GFR est non-AA >60 10/09/2017 02:20 PM    Calcium 8.9 10/09/2017 02:20 PM    Bilirubin, total 0.4 09/25/2017 02:03 PM    AST (SGOT) 44 09/25/2017 02:03 PM    Alk. phosphatase 70 09/25/2017 02:03 PM    Protein, total 7.7 09/25/2017 02:03 PM    Albumin 3.9 09/25/2017 02:03 PM    Globulin 3.8 09/25/2017 02:03 PM    A-G Ratio 1.0 09/25/2017 02:03 PM    ALT (SGPT) 43 09/25/2017 02:03 PM             Assessment:     1.  Metastatic poorly differentiated adenocarcinoma of the cecum:                         T4a N2b M1a    > Signet ring histology  > Serosal penetration  > single hypodense lesion in the liver  > 9/14 LN +ve  > extracapsular spread  > Lymphovascular invasion present     ANJEL wild type  MSI stable    Plan is to administer 3 months of systemic chemotherapy, assess response and resect the liver if the disease does not progress    Receiving systemic chemotherapy   mFOLFOX chemotherapy Cycle 4 day 3    Tolerating treatment very well  Denies any side effects. A detailed system by system evaluation of side effect was performed to assess chemotherapy related toxicity. Blood counts are acceptable. Results reviewed with the patient      2. Anemia, chemotherapy related    Observation      3. Neutropenia    > dose reduced oxaliplatin by 25%  > will attempt approval for neulasta for day 3        Plan:        > Continue mFOLFOX  > Plan to obtain CT after 6 cycles  > follow up in 2 weeks      I saw the patient in conjunction with DEIDRE Fajardo. Signed by: Keith Stevens MD                     October 13, 2017        CC. Angie Garcia MD  CC.  Edison Espino MD

## 2017-10-13 NOTE — PROGRESS NOTES
Pt is a 78 yr old Pt here for a routine follow up, Pt just finished cycle 4 Folfox for metastatic cecal ca. Pt reports feeling OK, eating OK, minimal nausea, no vomiting no diarrhea.

## 2017-10-23 NOTE — PROGRESS NOTES
[facility] Lab Draw Note:  Arrived - 1400    Visit Vitals    /73 (BP 1 Location: Left arm, BP Patient Position: Sitting)    Pulse 81    Temp 97.9 °F (36.6 °C)    Resp 18    Wt 91.5 kg (201 lb 12.8 oz)    SpO2 96%    BMI 28.96 kg/m2       Labs drawn peripherally from R AC arm -   Pt left at 1409 - Tolerated well. Pt denies any acute problems/changes. Discharged from [facility] ambulatory. No distress. See [facility] for pending lab results.

## 2017-10-24 NOTE — PROGRESS NOTES
Pt arrived to [facility] ambulatory in no acute distress at 0800 for Folfox C5.  Assessment unremarkable except pt c/o generalized fatigue and weakness. Patient Vitals for the past 12 hrs:   Temp Pulse Resp BP SpO2   10/24/17 1202 - 73 18 139/69 -   10/24/17 0805 98.1 °F (36.7 °C) 79 18 109/73 94 %       Pt's  ANC 1.0 Mickie Culbertson NP per her pt is ok for treatment and pt has received authorization for Neulasta to be given on Day 3 when 5FU pump is removed. Pt educated on  prevention and precaution measures related to infections. R chest port accessed without issue and positive blood return noted.      Labs obtained,   Recent Results (from the past 24 hour(s))   HEPATIC FUNCTION PANEL    Collection Time: 10/23/17  2:07 PM   Result Value Ref Range    Protein, total 7.6 6.4 - 8.2 g/dL    Albumin 3.7 3.5 - 5.0 g/dL    Globulin 3.9 2.0 - 4.0 g/dL    A-G Ratio 0.9 (L) 1.1 - 2.2      Bilirubin, total 0.4 0.2 - 1.0 MG/DL    Bilirubin, direct <0.1 0.0 - 0.2 MG/DL    Alk. phosphatase 74 45 - 117 U/L    AST (SGOT) 42 (H) 15 - 37 U/L    ALT (SGPT) 40 12 - 78 U/L   CBC WITH AUTOMATED DIFF    Collection Time: 10/23/17  2:07 PM   Result Value Ref Range    WBC 3.3 (L) 4.1 - 11.1 K/uL    RBC 3.76 (L) 4.10 - 5.70 M/uL    HGB 11.6 (L) 12.1 - 17.0 g/dL    HCT 33.6 (L) 36.6 - 50.3 %    MCV 89.4 80.0 - 99.0 FL    MCH 30.9 26.0 - 34.0 PG    MCHC 34.5 30.0 - 36.5 g/dL    RDW 15.9 (H) 11.5 - 14.5 %    PLATELET 885 (L) 515 - 400 K/uL    NEUTROPHILS 31 (L) 32 - 75 %    LYMPHOCYTES 54 (H) 12 - 49 %    MONOCYTES 11 5 - 13 %    EOSINOPHILS 3 0 - 7 %    BASOPHILS 1 0 - 1 %    ABS. NEUTROPHILS 1.0 (L) 1.8 - 8.0 K/UL    ABS. LYMPHOCYTES 1.8 0.8 - 3.5 K/UL    ABS. MONOCYTES 0.4 0.0 - 1.0 K/UL    ABS. EOSINOPHILS 0.1 0.0 - 0.4 K/UL    ABS.  BASOPHILS 0.0 0.0 - 0.1 K/UL   METABOLIC PANEL, COMPREHENSIVE    Collection Time: 10/23/17  2:07 PM   Result Value Ref Range    Sodium 139 136 - 145 mmol/L    Potassium 4.0 3.5 - 5.1 mmol/L    Chloride 106 97 - 108 mmol/L    CO2 23 21 - 32 mmol/L    Anion gap 10 5 - 15 mmol/L    Glucose 131 (H) 65 - 100 mg/dL    BUN 16 6 - 20 MG/DL    Creatinine 0.99 0.70 - 1.30 MG/DL    BUN/Creatinine ratio 16 12 - 20      GFR est AA >60 >60 ml/min/1.73m2    GFR est non-AA >60 >60 ml/min/1.73m2    Calcium 8.9 8.5 - 10.1 MG/DL    Bilirubin, total 0.4 0.2 - 1.0 MG/DL    ALT (SGPT) 42 12 - 78 U/L    AST (SGOT) 42 (H) 15 - 37 U/L    Alk. phosphatase 77 45 - 117 U/L    Protein, total 7.2 6.4 - 8.2 g/dL    Albumin 3.9 3.5 - 5.0 g/dL    Globulin 3.3 2.0 - 4.0 g/dL    A-G Ratio 1.2 1.1 - 2.2         The following medications administered:  D5% KVO  Aloxi 0.25 mg IVP  Decadron 12 mg IVP over 15 min  Oxaliplatin 130 mg IV over 2 hours  Leucovorin 830 mg IV over 2 hours (run concurrent with Oxaliplatin)  5FU Push 830 mg over 2 min  5FU Pump 4, 990 mg IV over 46 hours  NS Flush    Pt tolerated treatment well. No adverse reactions noted. Infusion pump in place and infusing without difficulty.  Pt discharged ambulatory in no acute distress at 1210, accompanied by Self. Next appointment 10/26/17.

## 2017-10-24 NOTE — PROGRESS NOTES
Problem: Patient Education:  Go to Education Activity  Goal: Patient/Family Education  Outcome: Progressing Towards Goal  Educated pt on how to manage possible side effects of chemotherapy regimen as well as those related to Neulasta. All questions and concerns addressed. Educated pt on infection precaution and prevention measures.

## 2017-10-26 NOTE — PROGRESS NOTES
[facility] Stay Note:  Arrived - 0 for Pump Removal and Neulasta injection    Patient Vitals for the past 12 hrs:   Temp Pulse Resp BP SpO2   10/26/17 1132 - 66 18 115/73 -   10/26/17 1050 98.1 °F (36.7 °C) 67 18 128/75 97 %       Assessment unchanged pt voiced no new complaints or concerns. Per pt his chemotherapy pump stopped at 1010 am today. R Chest port flushed with saline and heparin and positive for blood return. Port was de-accessed. Neulasta 6 mg SQ slowly in Left arm.   - Tolerated well. Pt observed 30 min post injection. No reaction noted. Pt denies any acute problems/changes. Discharged from [facility] ambulatory. No distress.  Next appt: 11/6/17

## 2017-10-26 NOTE — PROGRESS NOTES
Problem: Patient Education:  Go to Education Activity  Goal: Patient/Family Education  Outcome: Progressing Towards Goal  Patient educated on Neulasta injection. Pt given care-note handout. Pt voiced understanding of Neulasta injection and possible signs and symptoms of an allergic reaction and possible side effects.

## 2017-10-27 NOTE — PROGRESS NOTES
Laura Mulligan is a 78 y.o. male here today for met cecal cancer f/u. Chemo tx; Folfox; Cycle 5. VS stable. Patient denies V/D and constipation. Patient states he always feel nauseous; pt confirms taking antinausea med but still continue to feel nauseous. Patient states he feel light headed and dizzy; B/P stable. Patient states he's been feeling bad since he had the Neulasta injection yesterday.

## 2017-10-27 NOTE — PROGRESS NOTES
Progress Note      Patient: Jasmina Andino MRN: [id number]  SSN: [id number]    YOB: 1950  Age: 78 y.o. Sex: male        Diagnosis:     1. 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 mFOLFOX - Cycle 5 Day 4 (oxaliplatin dose reduced 25%)      Subjective:      Jasmina Andino is a 78 y.o. male with a diagnosis of metastatic cecal carcinoma. He presented to the ER with worsening abdominal pain for 5 days. He was recognized to have bowel obstruction and underwent laparatomy with right hemicolectomy. The pathology shows a diagnosis of poorly differentiated carcinoma of the cecum. 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 Petersen. He had a radical prostatectomy few months ago by Dr. Delfino Cortez. He suffers with CAD and had a CABG in the remote past.     He is receiving systemic chemotherapy. He is now getting Neulasta and reports feeling \"awful\" after receiving the shot. He also reports feeling intermittently dizzy. Review of Systems:    Constitutional: fatigue, dizziness  Eyes: negative  Ears, Nose, Mouth, Throat, and Face: negative  Respiratory: negative  Cardiovascular: negative  Gastrointestinal: nausea  Genitourinary:negative  Integument/Breast: negative  Hematologic/Lymphatic: negative  Musculoskeletal:generalized aches and pains  Neurological: negative       Prior to Admission medications    Medication Sig Start Date End Date Taking? Authorizing Provider   PROCTOSOL HC 2.5 % rectal cream USE 1 APPLICATION TOPICALLY 3 TIMES DAILY 10/18/17  Yes Historical Provider   lidocaine-prilocaine (EMLA) topical cream Apply  to affected area as needed for Pain. 8/16/17  Yes Claude Stauffer MD   aspirin 81 mg chewable tablet Take 81 mg by mouth daily. Yes Historical Provider   sildenafil citrate (VIAGRA) 50 mg tablet Take  by mouth as needed.  Indications: unsure of dosage   Yes Historical Provider   atorvastatin (LIPITOR) 40 mg tablet Take 40 mg by mouth daily. Yes Historical Provider   lisinopril (PRINIVIL, ZESTRIL) 5 mg tablet Take 5 mg by mouth daily. Yes Historical Provider   MULTIVITAMIN PO Take  by mouth. For men 48 +. Takes one po every other day. Yes Historical Provider   metoprolol succinate (TOPROL-XL) 25 mg XL tablet Take 25 mg by mouth daily. Yes Historical Provider   acetaminophen (TYLENOL EXTRA STRENGTH) 500 mg tablet Take 500 mg by mouth as needed for Pain. Yes Historical Provider   ondansetron (ZOFRAN ODT) 4 mg disintegrating tablet Take 1 Tab by mouth every eight (8) hours as needed for Nausea. 8/16/17   Tone Gage MD   HYDROcodone-acetaminophen [facility]) 5-325 mg per tablet Take 1-2 Tabs by mouth every four (4) hours as needed for Pain. Max Daily Amount: 12 Tabs. 8/8/17   Ricardo Gore MD   HYDROcodone-acetaminophen [facility]) 5-325 mg per tablet Take 1-2 Tabs by mouth every four (4) hours as needed. Max Daily Amount: 12 Tabs. 8/1/17   Godwin Simons MD        Allergies   Allergen Reactions    Plavix [Clopidogrel] Rash         Objective:     Vitals:    10/27/17 1047   BP: 110/74   Pulse: 79   Resp: 16   Temp: 97.7 °F (36.5 °C)   TempSrc: Oral   SpO2: 94%   Weight: 205 lb 6.4 oz (93.2 kg)   Height: 5' 10\" (1.778 m)            Physical Exam:    GENERAL: alert, cooperative, no distress, appears stated age  EYE: negative  LYMPHATIC: Cervical, supraclavicular, and axillary nodes normal.   THROAT & NECK: normal and no erythema or exudates noted.    LUNG: clear to auscultation bilaterally  HEART: regular rate and rhythm  ABDOMEN: soft, non-tender  EXTREMITIES:  no edema  SKIN: Normal.  NEUROLOGIC: negative        CT Results (most recent):    Results from Hospital Encounter encounter on 08/03/17   CT ABD PELV W CONT   Narrative INDICATION: paroxysmal severe R flank pain, recent ex-lap surgery  status post  right colectomy for cecal carcinoma 7/28/2017    COMPARISON: 7/25/2017    TECHNIQUE:   Following the uneventful intravenous administration of 100 cc Isovue-370, thin  axial images were obtained through the abdomen and pelvis. Coronal and sagittal  reconstructions were generated. Oral contrast was not administered. CT dose  reduction was achieved through use of a standardized protocol tailored for this  examination and automatic exposure control for dose modulation. FINDINGS:   LUNG BASES: Calcified granuloma again noted in the right middle lobe base. LIVER: The small lucent lesion at the dome of the liver previously described is  again seen with surrounding enhancement. It is approximately 1.3 cm in size in  hepatic segment 8. In addition to this lesion there is a focal lesion along the  periphery of segment 8 with an enhancing rim. This is approximately 1.5 cm in  size. This is better visualized on the current examination. GALLBLADDER: Unremarkable. SPLEEN: No mass. PANCREAS: No mass or ductal dilatation. ADRENALS: Unremarkable. KIDNEYS: No mass, calculus, or hydronephrosis. GI: Since the previous examination there has been a right colectomy. The  ileocolic anastomosis is identified in the right abdomen. There is a slight  amount of edema in the peritoneal fat on the right, however this is well within  expected appearance for this point postoperative. There is a small amount of  ascites at the tip of the right lobe of the liver and in the paracolic gutter in  this region. Again this is expected postoperatively. There is no evidence of  bowel obstruction. APPENDIX: Absent  PERITONEUM: Ascites is also noted in the pelvis which is minimal. There is no  free intraperitoneal air. RETROPERITONEUM: No lymphadenopathy or aortic aneurysm. URINARY BLADDER: No mass or calculus. PELVIS: The cystic structure in the extraperitoneal region on the left adjacent  to the hip is again noted. This is unchanged from the previous study.  There is  no surrounding edema. Total hip replacement again noted on the left. BONES: No destructive bone lesion. ADDITIONAL COMMENTS: N/A         Impression IMPRESSION:    1. Status post right colectomy. No complication identified secondary to this. There is no abscess or abnormal fluid collection other than expected  postoperative minimal fluid in this region. 2. The appearance of the liver has changed slightly. There is now a new lesion  identified along the surface of the right lobe of the liver laterally. The  previously described lesion in the dome of the liver is again noted as well as. The possibility of metastatic disease should be considered in light of the  surgical findings. 3. The cystic structure in the extraperitoneal region extending through the  inguinal canal into the soft tissues anterior to the acetabulum on the left  unchanged from the previous exam..                Lab Results   Component Value Date/Time    WBC 3.3 10/23/2017 02:07 PM    HGB 11.6 10/23/2017 02:07 PM    HCT 33.6 10/23/2017 02:07 PM    PLATELET 238 96/07/7234 02:07 PM    MCV 89.4 10/23/2017 02:07 PM       Lab Results   Component Value Date/Time    Sodium 139 10/23/2017 02:07 PM    Potassium 4.0 10/23/2017 02:07 PM    Chloride 106 10/23/2017 02:07 PM    CO2 23 10/23/2017 02:07 PM    Anion gap 10 10/23/2017 02:07 PM    Glucose 131 10/23/2017 02:07 PM    BUN 16 10/23/2017 02:07 PM    Creatinine 0.99 10/23/2017 02:07 PM    BUN/Creatinine ratio 16 10/23/2017 02:07 PM    GFR est AA >60 10/23/2017 02:07 PM    GFR est non-AA >60 10/23/2017 02:07 PM    Calcium 8.9 10/23/2017 02:07 PM    Bilirubin, total 0.4 10/23/2017 02:07 PM    Bilirubin, total 0.4 10/23/2017 02:07 PM    AST (SGOT) 42 10/23/2017 02:07 PM    AST (SGOT) 42 10/23/2017 02:07 PM    Alk. phosphatase 74 10/23/2017 02:07 PM    Alk.  phosphatase 77 10/23/2017 02:07 PM    Protein, total 7.6 10/23/2017 02:07 PM    Protein, total 7.2 10/23/2017 02:07 PM    Albumin 3.7 10/23/2017 02:07 PM    Albumin 3.9 10/23/2017 02:07 PM    Globulin 3.9 10/23/2017 02:07 PM    Globulin 3.3 10/23/2017 02:07 PM    A-G Ratio 0.9 10/23/2017 02:07 PM    A-G Ratio 1.2 10/23/2017 02:07 PM    ALT (SGPT) 40 10/23/2017 02:07 PM    ALT (SGPT) 42 10/23/2017 02:07 PM             Assessment:     1. Metastatic poorly differentiated adenocarcinoma of the cecum:                         T4a N2b M1a    > Signet ring histology  > Serosal penetration  > single hypodense lesion in the liver  > 9/14 LN +ve  > extracapsular spread  > Lymphovascular invasion present     ANJEL wild type  MSI stable    Plan is to administer 3 months of systemic chemotherapy, assess response and resect the liver if the disease does not progress    Receiving systemic chemotherapy   mFOLFOX - Cycle 5 Day 4 (oxaliplatin dose reduced 25% d/t neutropenia)    Tolerating treatment very well  Denies any side effects. A detailed system by system evaluation of side effect was performed to assess chemotherapy related toxicity. Blood counts are acceptable. Results reviewed with the patient    Symptom management form reviewed with patient. 2. Anemia, chemotherapy related    Observation      3. Neutropenia    > Dose reduced oxaliplatin by 25%  > Neulasta on Day 3        Plan:        > Continue mFOLFOX  > Neulasta on Day 3  > Plan to obtain CT after 6 cycles  > Follow up in 2 weeks      I saw the patient in conjunction with Raman Ceballos NP. Signed by: Nahomy Villatoro MD                     November 10, 2017          CC. Mauro Bateman MD  CC.  Lakisha Bernardo MD

## 2017-10-27 NOTE — MR AVS SNAPSHOT
Visit Information Date & Time Provider Department Dept. Phone Encounter #  
 10/27/2017 10:45 AM Kale Guerrero, ALEXANDRO [address] [facility] at [facility] [phone] [id number] Upcoming Health Maintenance Date Due Hepatitis C Screening 1950 DTaP/Tdap/Td series (1 - Tdap) 10/30/1970 FOBT Q 1 YEAR AGE 50-75 10/30/1999 ZOSTER VACCINE AGE 60> 8/30/2009 GLAUCOMA SCREENING Q2Y 10/30/2014 MEDICARE YEARLY EXAM 10/30/2014 INFLUENZA AGE 9 TO ADULT 8/1/2017 Pneumococcal 65+ High/Highest Risk (2 of 2 - PPSV23) 12/11/2017 Allergies as of 10/27/2017  Review Complete On: 10/27/2017 By: Srinivasan Chin LPN Severity Noted Reaction Type Reactions Plavix [Clopidogrel]  10/19/2012    Rash Current Immunizations  Reviewed on 10/24/2017 Name Date Influenza Vaccine 10/1/2015 Influenza Vaccine Split 9/1/2012 ZZZ-RETIRED (DO NOT USE) Pneumococcal Vaccine (Unspecified Type) 12/11/2012  2:02 PM  
  
 Not reviewed this visit Vitals BP Pulse Temp Resp Height(growth percentile) Weight(growth percentile) 110/74 (BP 1 Location: Left arm, BP Patient Position: Sitting) 79 97.7 °F (36.5 °C) (Oral) 16 5' 10\" (1.778 m) 205 lb 6.4 oz (93.2 kg) SpO2 BMI Smoking Status 94% 29.47 kg/m2 Former Smoker Vitals History BMI and BSA Data Body Mass Index Body Surface Area  
 29.47 kg/m 2 2.15 m 2 Preferred Pharmacy Pharmacy Name Phone [phone] [facility] [phone] Your Updated Medication List  
  
   
This list is accurate as of: 10/27/17 11:09 AM.  Always use your most recent med list.  
  
  
  
  
 aspirin 81 mg chewable tablet Take 81 mg by mouth daily. atorvastatin 40 mg tablet Commonly known as:  LIPITOR Take 40 mg by mouth daily. * HYDROcodone-acetaminophen 5-325 mg per tablet Commonly known as:  [address] [facility]  
 Take 1-2 Tabs by mouth every four (4) hours as needed. Max Daily Amount: 12 Tabs. * HYDROcodone-acetaminophen 5-325 mg per tablet Commonly known as:  March Castles Take 1-2 Tabs by mouth every four (4) hours as needed for Pain. Max Daily Amount: 12 Tabs.  
  
 lidocaine-prilocaine topical cream  
Commonly known as:  EMLA Apply  to affected area as needed for Pain. lisinopril 5 mg tablet Commonly known as:  Henrietta Primmer Take 5 mg by mouth daily. metoprolol succinate 25 mg XL tablet Commonly known as:  TOPROL-XL Take 25 mg by mouth daily. MULTIVITAMIN PO Take  by mouth. For men 48 +. Takes one po every other day. ondansetron 4 mg disintegrating tablet Commonly known as:  ZOFRAN ODT Take 1 Tab by mouth every eight (8) hours as needed for Nausea. PROCTOSOL HC 2.5 % rectal cream  
Generic drug:  hydrocortisone USE 1 APPLICATION TOPICALLY 3 TIMES DAILY  
  
 sildenafil citrate 50 mg tablet Commonly known as:  VIAGRA Take  by mouth as needed. Indications: unsure of dosage TYLENOL EXTRA STRENGTH 500 mg tablet Generic drug:  acetaminophen Take 500 mg by mouth as needed for Pain. * Notice: This list has 2 medication(s) that are the same as other medications prescribed for you. Read the directions carefully, and ask your doctor or other care provider to review them with you. To-Do List   
 11/06/2017 2:00 PM  
  Appointment with Abhijeet Oconnell at [facility] ([phone])  
  
 11/07/2017 8:00 AM  
  Appointment with [address] [place] [address] at [facility] ([phone])  11/09/2017 3:00 PM  
  Appointment with [address] [place] [address] at [facility] ([phone])  
  
 11/17/2017 2:00 PM  
  Appointment with Abhijeet Oconnell at [facility] ([phone])  
  
 11/20/2017 8:00 AM  
  Appointment with Stone Gray at [facility] ([phone])  
  
 11/22/2017 3:00 PM  
 Appointment with CHAIR 2 [facility] ([phone]) 12/04/2017 2:00 PM  
  Appointment with Isaías City at [facility] ([phone]) 12/05/2017 8:00 AM  
  Appointment with CHAIR 2 [facility] ([phone]) 12/06/2017 3:00 PM  
  Appointment with CHAIR 3 [facility] ([phone]) Patient Instructions The day after your chemotherapy you will receive an injection to boost your white blood cells. This injection is known to cause bone aches/pains. We recommend you start ibuprofen(if not contraindicated) 400mg every 6 hours the morning of your injection and continue for 3 days. You may also try OTC claritin 3 days prior to your injection and 3 days after your injection. Introducing [facility] SERVICES! Dear Jon Erwin: Thank you for requesting a [employer] account. Our records indicate that you already have an active [employer] account. You can access your account anytime at [email or web address]. [employer]/SmartCare system Did you know that you can access your hospital and ER discharge instructions at any time in [employer]? You can also review all of your test results from your hospital stay or ER visit. Additional Information If you have questions, please visit the Frequently Asked Questions section of the [employer] website at [email or web address]. [employer]/SmartCare system/. Remember, [employer] is NOT to be used for urgent needs. For medical emergencies, dial 911. Now available from your iPhone and Android! Please provide this summary of care documentation to your next provider. Your primary care clinician is listed as Gracie Stephens. If you have any questions after today's visit, please call [phone].

## 2017-10-27 NOTE — PATIENT INSTRUCTIONS
The day after your chemotherapy you will receive an injection to boost your white blood cells. This injection is known to cause bone aches/pains. We recommend you start ibuprofen(if not contraindicated) 400mg every 6 hours the morning of your injection and continue for 3 days. You may also try OTC claritin 3 days prior to your injection and 3 days after your injection.

## 2017-11-06 NOTE — PROGRESS NOTES
[address] [facility] Lab Draw Note:  Arrived - 1350    Visit Vitals    /78 (BP 1 Location: Left arm, BP Patient Position: Sitting)    Pulse 85    Temp 98.1 °F (36.7 °C)    Resp 18    Wt 93.6 kg (206 lb 6.4 oz)    SpO2 94%    BMI 29.62 kg/m2       Labs drawn peripherally from L AC arm -   Pt left at 1403 - Tolerated well. Pt denies any acute problems/changes. Discharged from [facility] ambulatory. No distress. See [facility] for pending lab results.

## 2017-11-07 NOTE — PROGRESS NOTES
Pt arrived to [facility] ambulatory in no acute distress at 0800 for Folfox C6.  Assessment unremarkable except pt c/o generalized fatigue. R chest port accessed without issue and positive blood return noted.  Pt had labs drawn on 11/6/17 Platlets 70 , ok to treat per Tony Robertson NP. Visit Vitals    /73 (BP 1 Location: Left arm, BP Patient Position: At rest;Sitting)    Pulse 95    Temp 98.6 °F (37 °C)    Resp 18    Ht 5' 10\" (1.778 m)    Wt 93.6 kg (206 lb 6.4 oz)    SpO2 95%    BMI 29.62 kg/m2          Labs obtained,   Recent Results (from the past 24 hour(s))   CBC WITH AUTOMATED DIFF    Collection Time: 11/06/17  2:02 PM   Result Value Ref Range    WBC 15.7 (H) 4.1 - 11.1 K/uL    RBC 3.69 (L) 4.10 - 5.70 M/uL    HGB 11.6 (L) 12.1 - 17.0 g/dL    HCT 34.2 (L) 36.6 - 50.3 %    MCV 92.7 80.0 - 99.0 FL    MCH 31.4 26.0 - 34.0 PG    MCHC 33.9 30.0 - 36.5 g/dL    RDW 17.3 (H) 11.5 - 14.5 %    PLATELET 70 (L) 641 - 400 K/uL    NEUTROPHILS 50 %    BAND NEUTROPHILS 5 %    LYMPHOCYTES 24 %    MONOCYTES 13 %    EOSINOPHILS 2 %    BASOPHILS 0 %    METAMYELOCYTES 4 %    MYELOCYTES 2 %    ABS. NEUTROPHILS 8.6 K/UL    ABS. LYMPHOCYTES 3.8 K/UL    ABS. MONOCYTES 2.0 K/UL    ABS. EOSINOPHILS 0.3 K/UL    ABS.  BASOPHILS 0.0 K/UL    RBC COMMENTS ANISOCYTOSIS  1+        DF MANUAL     METABOLIC PANEL, BASIC    Collection Time: 11/06/17  2:02 PM   Result Value Ref Range    Sodium 138 136 - 145 mmol/L    Potassium 4.2 3.5 - 5.1 mmol/L    Chloride 105 97 - 108 mmol/L    CO2 26 21 - 32 mmol/L    Anion gap 7 5 - 15 mmol/L    Glucose 107 (H) 65 - 100 mg/dL    BUN 15 6 - 20 MG/DL    Creatinine 1.06 0.70 - 1.30 MG/DL    BUN/Creatinine ratio 14 12 - 20      GFR est AA >60 >60 ml/min/1.73m2    GFR est non-AA >60 >60 ml/min/1.73m2    Calcium 8.5 8.5 - 10.1 MG/DL   NUCLEATED RBC    Collection Time: 11/06/17  2:02 PM   Result Value Ref Range    NRBC 2.0 (H) 0  WBC    ABSOLUTE NRBC 0.32 (H) 0.00 - 0.01 K/uL    WBC CORRECTED FOR NR ADJUSTED FOR NUCLEATED RBC'S         The following medications administered:  NS Flush  D5% KVO  Aloxi 0.25 mg IVP  Decadron 12 mg IVPB over 15 min  Oxaliplatin 130 mg IV over 2 hours  Leucovorin 830 mg IV over 2 hours (run concurrently with Oxaliplatin)  5FU Push 830 mg over 2 min  5FU Pump 4, 990 mg IV over 46 hours    Visit Vitals    /85 (BP 1 Location: Left arm, BP Patient Position: At rest;Sitting)    Pulse 73    Temp 98.6 °F (37 °C)    Resp 16    Ht 5' 10\" (1.778 m)    Wt 93.6 kg (206 lb 6.4 oz)    SpO2 95%    BMI 29.62 kg/m2       Pt tolerated treatment well. No adverse reactions noted. Chest Port accessed and chemo pump infusing. Pt discharged ambulatory in no acute distress at 1205, accompanied by self.   Next appointment 11/9/17 @ 3 pm

## 2017-11-09 NOTE — PROGRESS NOTES
[address] [facility]. Short Visit Note:    0003 Pt arrived at [facility] ambulatory and in no distress for pump removal.  No new complaints voiced. Pt states he will be having scans done, and may not be receiving treatment again until the new year. Visit Vitals    /76 (BP 1 Location: Left arm, BP Patient Position: Sitting)    Pulse 63    Temp 97.5 °F (36.4 °C)    Resp 18    SpO2 97%       All chemo contents infused. Port flushed per policy and needle removed, 2x2 and paper tape placed. Neulasta 6mg given SQ in L arm    1050 Tolerated treatment well, no adverse reaction noted. D/Cd from [facility] ambulatory and in no distress accompanied by self. Next appt 11/17/17 at 1400.

## 2017-11-10 NOTE — PROGRESS NOTES
Raymond Pacheco is a 75 y.o. male here today for met colon cancer f/u. Chem tx: Folfox Cycle 6. Oxaliplatin 25% reduced. VS stable. Patient states he has pain when he has a BM. Patient has rectal bleeding. Patient states he has constipation every am; which is followed by diarrhea; patient very frustrated. Patient denies pain at this time. Patient states he has numbness and tingling in his hands and fingers.

## 2017-11-10 NOTE — PROGRESS NOTES
Progress Note      Patient: Gabriele Croft MRN: [id number]  SSN: [id number]    YOB: 1950  Age: 75 y.o. Sex: male        Diagnosis:     1. 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 mFOLFOX - Cycle 6 Day 4 (oxaliplatin dose reduced 25%)    Subjective:      Gabriele Croft is a 75 y.o. male with a diagnosis of metastatic cecal carcinoma. He presented to the ER with worsening abdominal pain for 5 days. He was recognized to have bowel obstruction and underwent laparatomy with right hemicolectomy. The pathology shows a diagnosis of poorly differentiated carcinoma of the cecum. 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 Petersen. He had a radical prostatectomy few months ago by Dr. Last Leo. He suffers with CAD and had a CABG in the remote past.     He is receiving systemic chemotherapy. He is complaining of nausea, diarrhea and intermittent constipation. He has received 6 cycles of mFOLFOX. Review of Systems:    Constitutional: fatigue, dizziness  Eyes: negative  Ears, Nose, Mouth, Throat, and Face: negative  Respiratory: negative  Cardiovascular: negative  Gastrointestinal: nausea  Genitourinary:negative  Integument/Breast: negative  Hematologic/Lymphatic: negative  Musculoskeletal:generalized aches and pains  Neurological: negative       Prior to Admission medications    Medication Sig Start Date End Date Taking? Authorizing Provider   sildenafil (REVATIO) 20 mg tablet TAKE 1 TO 5 TABLETS EVERY DAY IF NEEDED FOR SEXUAL ACTIVITY 8/28/17  Yes Historical Provider   PROCTOSOL HC 2.5 % rectal cream USE 1 APPLICATION TOPICALLY 3 TIMES DAILY 10/18/17  Yes Historical Provider   ondansetron (ZOFRAN ODT) 4 mg disintegrating tablet Take 1 Tab by mouth every eight (8) hours as needed for Nausea.  8/16/17  Yes Kristine Bhatia MD   lidocaine-prilocaine (EMLA) topical cream Apply  to affected area as needed for Pain. 8/16/17  Yes Clover Villalba MD   aspirin 81 mg chewable tablet Take 81 mg by mouth daily. Yes Historical Provider   atorvastatin (LIPITOR) 40 mg tablet Take 40 mg by mouth daily. Yes Historical Provider   lisinopril (PRINIVIL, ZESTRIL) 5 mg tablet Take 5 mg by mouth daily. Yes Historical Provider   MULTIVITAMIN PO Take  by mouth. For men 48 +. Takes one po every other day. Yes Historical Provider   metoprolol succinate (TOPROL-XL) 25 mg XL tablet Take 25 mg by mouth daily. Yes Historical Provider   acetaminophen (TYLENOL EXTRA STRENGTH) 500 mg tablet Take 500 mg by mouth as needed for Pain. Yes Historical Provider   diclofenac EC (VOLTAREN) 75 mg EC tablet TAKE 1 TABLET BY MOUTH TWICE A DAY 8/9/17   Historical Provider   tiZANidine (ZANAFLEX) 2 mg tablet TAKE 1 TABLET BY MOUTH 3 TIMES A DAY AS NEEDED 8/9/17   Historical Provider   HYDROcodone-acetaminophen (NORCO) 5-325 mg per tablet Take 1-2 Tabs by mouth every four (4) hours as needed. Max Daily Amount: 12 Tabs. 8/1/17   Farideh Taylor MD   sildenafil citrate (VIAGRA) 50 mg tablet Take  by mouth as needed. Indications: unsure of dosage    Historical Provider        Allergies   Allergen Reactions    Plavix [Clopidogrel] Rash         Objective:     Vitals:    11/10/17 1111   BP: 116/75   Pulse: 90   Resp: 16   Temp: 98.3 °F (36.8 °C)   SpO2: 94%   Weight: 209 lb 3.2 oz (94.9 kg)   Height: 5' 10\" (1.778 m)            Physical Exam:    GENERAL: alert, cooperative, no distress, appears stated age  EYE: negative  LYMPHATIC: Cervical, supraclavicular, and axillary nodes normal.   THROAT & NECK: normal and no erythema or exudates noted.    LUNG: clear to auscultation bilaterally  HEART: regular rate and rhythm  ABDOMEN: soft, non-tender  EXTREMITIES:  no edema  SKIN: Normal.  NEUROLOGIC: negative        CT Results (most recent):    Results from Hospital Encounter encounter on 08/03/17   CT ABD PELV W CONT   Narrative INDICATION: paroxysmal severe R flank pain, recent ex-lap surgery  status post  right colectomy for cecal carcinoma 7/28/2017    COMPARISON: 7/25/2017    TECHNIQUE:   Following the uneventful intravenous administration of 100 cc Isovue-370, thin  axial images were obtained through the abdomen and pelvis. Coronal and sagittal  reconstructions were generated. Oral contrast was not administered. CT dose  reduction was achieved through use of a standardized protocol tailored for this  examination and automatic exposure control for dose modulation. FINDINGS:   LUNG BASES: Calcified granuloma again noted in the right middle lobe base. LIVER: The small lucent lesion at the dome of the liver previously described is  again seen with surrounding enhancement. It is approximately 1.3 cm in size in  hepatic segment 8. In addition to this lesion there is a focal lesion along the  periphery of segment 8 with an enhancing rim. This is approximately 1.5 cm in  size. This is better visualized on the current examination. GALLBLADDER: Unremarkable. SPLEEN: No mass. PANCREAS: No mass or ductal dilatation. ADRENALS: Unremarkable. KIDNEYS: No mass, calculus, or hydronephrosis. GI: Since the previous examination there has been a right colectomy. The  ileocolic anastomosis is identified in the right abdomen. There is a slight  amount of edema in the peritoneal fat on the right, however this is well within  expected appearance for this point postoperative. There is a small amount of  ascites at the tip of the right lobe of the liver and in the paracolic gutter in  this region. Again this is expected postoperatively. There is no evidence of  bowel obstruction. APPENDIX: Absent  PERITONEUM: Ascites is also noted in the pelvis which is minimal. There is no  free intraperitoneal air. RETROPERITONEUM: No lymphadenopathy or aortic aneurysm. URINARY BLADDER: No mass or calculus.   PELVIS: The cystic structure in the extraperitoneal region on the left adjacent  to the hip is again noted. This is unchanged from the previous study. There is  no surrounding edema. Total hip replacement again noted on the left. BONES: No destructive bone lesion. ADDITIONAL COMMENTS: N/A         Impression IMPRESSION:    1. Status post right colectomy. No complication identified secondary to this. There is no abscess or abnormal fluid collection other than expected  postoperative minimal fluid in this region. 2. The appearance of the liver has changed slightly. There is now a new lesion  identified along the surface of the right lobe of the liver laterally. The  previously described lesion in the dome of the liver is again noted as well as. The possibility of metastatic disease should be considered in light of the  surgical findings. 3. The cystic structure in the extraperitoneal region extending through the  inguinal canal into the soft tissues anterior to the acetabulum on the left  unchanged from the previous exam..                Lab Results   Component Value Date/Time    WBC 15.7 11/06/2017 02:02 PM    HGB 11.6 11/06/2017 02:02 PM    HCT 34.2 11/06/2017 02:02 PM    PLATELET 70 79/82/8511 02:02 PM    MCV 92.7 11/06/2017 02:02 PM       Lab Results   Component Value Date/Time    Sodium 138 11/06/2017 02:02 PM    Potassium 4.2 11/06/2017 02:02 PM    Chloride 105 11/06/2017 02:02 PM    CO2 26 11/06/2017 02:02 PM    Anion gap 7 11/06/2017 02:02 PM    Glucose 107 11/06/2017 02:02 PM    BUN 15 11/06/2017 02:02 PM    Creatinine 1.06 11/06/2017 02:02 PM    BUN/Creatinine ratio 14 11/06/2017 02:02 PM    GFR est AA >60 11/06/2017 02:02 PM    GFR est non-AA >60 11/06/2017 02:02 PM    Calcium 8.5 11/06/2017 02:02 PM    Bilirubin, total 0.4 10/23/2017 02:07 PM    Bilirubin, total 0.4 10/23/2017 02:07 PM    AST (SGOT) 42 10/23/2017 02:07 PM    AST (SGOT) 42 10/23/2017 02:07 PM    Alk. phosphatase 74 10/23/2017 02:07 PM    Alk.  phosphatase 77 10/23/2017 02:07 PM    Protein, total 7.6 10/23/2017 02:07 PM    Protein, total 7.2 10/23/2017 02:07 PM    Albumin 3.7 10/23/2017 02:07 PM    Albumin 3.9 10/23/2017 02:07 PM    Globulin 3.9 10/23/2017 02:07 PM    Globulin 3.3 10/23/2017 02:07 PM    A-G Ratio 0.9 10/23/2017 02:07 PM    A-G Ratio 1.2 10/23/2017 02:07 PM    ALT (SGPT) 40 10/23/2017 02:07 PM    ALT (SGPT) 42 10/23/2017 02:07 PM       Assessment:     1. Metastatic poorly differentiated adenocarcinoma of the cecum:                         T4a N2b M1a    > Signet ring histology  > Serosal penetration  > single hypodense lesion in the liver  > 9/14 LN +ve  > extracapsular spread  > Lymphovascular invasion present     ANJEL wild type  MSI stable    Plan is to administer 3 months of systemic chemotherapy, assess response and resect the liver if the disease does not progress    Receiving systemic chemotherapy   mFOLFOX - Cycle 6 Day 4 (oxaliplatin dose reduced 25% d/t neutropenia)    Tolerating treatment very well  Denies any side effects. A detailed system by system evaluation of side effect was performed to assess chemotherapy related toxicity. Blood counts are acceptable. Results reviewed with the patient    Symptom management form reviewed with patient. 2. Anemia, chemotherapy related    Observation      3. Neutropenia    > Dose reduced oxaliplatin by 25%  > Neulasta on Day 3      Plan:        > Hold mFOLFOX until after surgery. > Neulasta on Day 3  > CT abdomen  > Follow up after surgery      I saw the patient in conjunction with Houston Salinas NP      Signed by: Malgorzata Adams MD                     November 10, 2017          CC. Ming Clarke MD  CC.  Junie Turner MD

## 2017-11-16 NOTE — PROGRESS NOTES
Slight decrease in the tumor in the liver. He needs to see Dr. Zaynab Peralta to discuss the next steps with liver resection.

## 2017-11-28 NOTE — PATIENT INSTRUCTIONS
Surgery Instruction Sheet    You have been scheduled for surgery on 12/28/2017 at 1:30pm at [facility].. Please report to the Surgery Center at 11:30am, this is approximately 2 hours prior to your surgery time and is subject to change. The Surgery Center is located on the [address] [place] [address] Street side of the [facility], just next to the Emergency Room. Reserved parking is available and  parking if lot is full. You will need to have a Pre-op Visit prior to your surgery. Report to the Surgery Center on 12/20/2017 at 8:00am.  Bring a list of medications and your insurance cards with you. You may eat/drink prior to this visit. Call your physician immediately if you notice a change in your health between the time you saw your physician and the day of surgery. If you take a blood thinner, please let us know. Call your ordering Doctor to make sure you can stop taking it prior to your surgery. STOP YOUR ASPIRIN 10 DAYS PRIOR TO SURGERY. DO NOT TAKE  IBUPROFEN, ADVIL, MOTRIN, ALEVE, EXCEDRIN, BC POWDER, GOODIES, FISH OIL OR ANY MEDICATION CONTAINING ASPIRIN 10 DAYS PRIOR TO YOUR SURGERY. MAY TAKE TYLENOL. Eat a light dinner the evening before your surgery. DO NOT EAT OR DRINK ANYTHING AFTER MIDNIGHT THE NIGHT BEFORE YOUR SURGERY. This includes water, chewing gum, lifesavers, etc.  The Pre op nurse will check with you about any medication that you may need to take the morning of surgery. Shower with a new bar of anti-bacterial soap (Dial, Safeguard) or solution given to you by Pre-op, the night before surgery. Do not use lotion, powder, deodorant on the skin after showering.   Wear loose, comfortable clothing the day of surgery and bring a container to store your contacts, eyeglasses, dentures, hearing aid, etc.  Do not bring money, valuables, jewelry, etc. to the hospital.      If you are having outpatient surgery, someone must come with you the morning of surgery to drive you home. You can not drive for 24 hours after any anesthesia. Sometimes it is necessary to stay overnight and leave the next morning. This is still considered outpatient for most insurance deductibles. Someone will still need to drive you home. If you have questions or concerns, please feel free to call Dr Shira Mccormick at [phone]. If you need to cancel your surgery, please call as soon as possible.

## 2017-11-28 NOTE — PROGRESS NOTES
HISTORY OF PRESENT ILLNESS  Afia Gunderson is a 75 y.o. male who comes in for consultation by Dr Arlette Maldonado for metastatic colon cancer  HPI  He underwent emergent right colectomy and liver biopsy for a [id number] poorly differentiated adenocarcinoma with signet ring features in July 2017 by Dr Ruby Packer. He has been receiving adjuvant chemotherapy with DR Arlette Maldonado since then. He has two liver lesions which have reduced in size. He now comes in to discuss resection of the liver lesions. He is feeling ok but he had a neulasta shot and is still feeling weak in the legs. He last had chemotherapy on 11/9. He denies jaundice, fever, chills, sweats, nausea, vomiting, diarrhea, constipation, melena, hematochezia, dysuria. He also has a hx of prostate cancer dx 3/2017 ([address] [place] [address] and underwent a RA prostatectomy. He has also had a throat cancer treated by DR Braden Petersen.     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) - surgery    Cancer ([address]     Prostate Cancer    Carotid artery occlusion     reports 100% occlusion on one side and 55-70% blockage in the over side (this has not changed in at least 5-6 yrs)/has doppler study on neck 2 times a yr    Chronic obstructive pulmonary disease (HCC)     Chronic pain     r/t arthritis    Congestive heart failure ([address]     Hyperlipidemia     Hypertension     S/P CABG x 3 12/6/2012     Past Surgical History:   Procedure Laterality Date    ABDOMEN SURGERY PROC UNLISTED      CABG, ARTERY-VEIN, THREE  12/6/2012    catherization    COLONOSCOPY N/A 8/19/2016    COL[facility]ar [facility] ENDOSCOPY    HX APPENDECTOMY      HX GI      colonoscopy 2012 - polyp    HX HEENT      tonsillectomy    HX HEENT  2015    Mouth Cancer surgery    HX ORTHOPAEDIC Left     hip replacement     HX ORTHOPAEDIC      plantar warts removed    HX ORTHOPAEDIC Right     foot    HX OTHER SURGICAL  12/3/15    bx right side of mouth/cancer removed with lymph nodes in neck removed     Family History   Problem Relation Age of Onset    Heart Disease Mother     Hypertension Mother     Diabetes Mother     Psychiatric Disorder Father     Asthma Daughter     Anesth Problems Neg Hx      Social History   Substance Use Topics    Smoking status: Former Smoker     Packs/day: 1.50     Years: 50.00     Quit date: 12/6/2012    Smokeless tobacco: Former User    Alcohol use 12.0 oz/week     20 Cans of beer per week     Current Outpatient Prescriptions   Medication Sig    diclofenac EC (VOLTAREN) 75 mg EC tablet TAKE 1 TABLET BY MOUTH TWICE A DAY    sildenafil (REVATIO) 20 mg tablet TAKE 1 TO 5 TABLETS EVERY DAY IF NEEDED FOR SEXUAL ACTIVITY    tiZANidine (ZANAFLEX) 2 mg tablet TAKE 1 TABLET BY MOUTH 3 TIMES A DAY AS NEEDED    PROCTOSOL HC 2.5 % rectal cream USE 1 APPLICATION TOPICALLY 3 TIMES DAILY    lidocaine-prilocaine (EMLA) topical cream Apply  to affected area as needed for Pain.  aspirin 81 mg chewable tablet Take 81 mg by mouth daily.  sildenafil citrate (VIAGRA) 50 mg tablet Take  by mouth as needed. Indications: unsure of dosage    atorvastatin (LIPITOR) 40 mg tablet Take 40 mg by mouth daily.  lisinopril (PRINIVIL, ZESTRIL) 5 mg tablet Take 5 mg by mouth daily.  MULTIVITAMIN PO Take  by mouth. For men 48 +. Takes one po every other day.  metoprolol succinate (TOPROL-XL) 25 mg XL tablet Take 25 mg by mouth daily.  acetaminophen (TYLENOL EXTRA STRENGTH) 500 mg tablet Take 500 mg by mouth as needed for Pain. No current facility-administered medications for this visit.       Allergies   Allergen Reactions    Plavix [Clopidogrel] Rash       Review of Systems   Constitutional: Negative for chills, diaphoresis, fever, malaise/fatigue and weight loss. HENT: Negative for congestion, ear pain and sore throat. Eyes: Negative for blurred vision and pain. Respiratory: Negative for cough, hemoptysis, sputum production, shortness of breath, wheezing and stridor. Cardiovascular: Negative for chest pain, palpitations, orthopnea, claudication, leg swelling and PND. Gastrointestinal: Negative for abdominal pain, blood in stool, constipation, diarrhea, heartburn, melena, nausea and vomiting. Genitourinary: Negative for dysuria, flank pain, frequency, hematuria and urgency. Musculoskeletal: Negative for back pain, joint pain, myalgias and neck pain. Skin: Negative for itching and rash. Neurological: Negative for dizziness, tremors, focal weakness, seizures, weakness and headaches. Endo/Heme/Allergies: Negative for polydipsia. Psychiatric/Behavioral: Negative for depression and memory loss. The patient is not nervous/anxious. Visit Vitals    /64    Pulse 80    Ht 5' 10\" (1.778 m)    Wt 92.5 kg (204 lb)    SpO2 92%    BMI 29.27 kg/m2       Physical Exam   Constitutional: He is oriented to person, place, and time. He appears well-developed and well-nourished. No distress. HENT:   Head: Normocephalic and atraumatic. Mouth/Throat: Oropharynx is clear and moist. No oropharyngeal exudate. Eyes: Conjunctivae and EOM are normal. Pupils are equal, round, and reactive to light. No scleral icterus. Neck: Normal range of motion. Neck supple. No tracheal deviation present. No thyromegaly present. Cardiovascular: Normal rate, regular rhythm and normal heart sounds. Exam reveals no gallop and no friction rub. No murmur heard. Pulmonary/Chest: Effort normal and breath sounds normal. No stridor. No respiratory distress. He has no wheezes. He has no rales. Abdominal: Soft.  Normal appearance and bowel sounds are normal. He exhibits no distension, no pulsatile liver and no mass. There is no hepatosplenomegaly. There is no tenderness. There is no rebound, no guarding and no CVA tenderness. No hernia. Hernia confirmed negative in the ventral area, confirmed negative in the right inguinal area and confirmed negative in the left inguinal area. Genitourinary: Testes normal and penis normal. Cremasteric reflex is present. Circumcised. Musculoskeletal: Normal range of motion. He exhibits no edema or tenderness. Lymphadenopathy:     He has no cervical adenopathy. Right: No inguinal adenopathy present. Left: No inguinal adenopathy present. Neurological: He is alert and oriented to person, place, and time. No cranial nerve deficit. Coordination normal.   Skin: Skin is warm and dry. No rash noted. He is not diaphoretic. No erythema. Psychiatric: He has a normal mood and affect. His behavior is normal. Judgment and thought content normal.     I reviewed his CT images from July, August and November 2017  ASSESSMENT and PLAN  1.  Stage IV colon cancer with liver mets. S/P adjuvant chemotherapy for 3 months. I explained to him about liver lesions and options for observation and resection. Best option is resection as there are only two that are known and then to undergo more chemotherapy. The lesions appear to be superficial in the dome . Risks of resection include, but are not limited to, bleeding, transfusion, bile leak, liver failure, sepsis, DVT/PE, urinary/pulmonary/cardiac/CNS complications, hernia, death. 2.  Hx prostate cancer. JARVIS per pt. Seeing DR Brooklynn Tavera' today  3. Hx head and neck cancer. JARVIS per pt. Followed by Dr [employer] May  4. Essential hypertension  5.   Hyperlipidemia    He desires a laparoscopic possible open partial right hepatic lobectomy with intraoperative US guidance under general anesthesia as an inpatient  Will need HABIB radiofrequency ablation device to manage the liver bed.     Rosalie Marr MD FACS

## 2017-11-28 NOTE — MR AVS SNAPSHOT
Visit Information Date & Time Provider Department Dept. Phone Encounter #  
 11/28/2017  8:20 AM Diane Reagan MD [facility] [id number] Upcoming Health Maintenance Date Due Hepatitis C Screening 1950 DTaP/Tdap/Td series (1 - Tdap) 10/30/1970 FOBT Q 1 YEAR AGE 50-75 10/30/1999 ZOSTER VACCINE AGE 60> 8/30/2009 GLAUCOMA SCREENING Q2Y 10/30/2014 MEDICARE YEARLY EXAM 10/30/2014 Influenza Age 5 to Adult 8/1/2017 Pneumococcal 65+ High/Highest Risk (2 of 2 - PPSV23) 12/11/2017 Allergies as of 11/28/2017  Review Complete On: 11/28/2017 By: iDane Reagan MD  
  
 Severity Noted Reaction Type Reactions Plavix [Clopidogrel]  10/19/2012    Rash Current Immunizations  Reviewed on 11/7/2017 Name Date Influenza Vaccine 10/1/2015 Influenza Vaccine Split 9/1/2012 ZZZ-RETIRED (DO NOT USE) Pneumococcal Vaccine (Unspecified Type) 12/11/2012  2:02 PM  
  
 Not reviewed this visit Vitals BP Pulse Height(growth percentile) Weight(growth percentile) SpO2 BMI  
 125/64 80 5' 10\" (1.778 m) 204 lb (92.5 kg) 92% 29.27 kg/m2 Smoking Status Former Smoker BMI and BSA Data Body Mass Index Body Surface Area  
 29.27 kg/m 2 2.14 m 2 Preferred Pharmacy Pharmacy Name Phone [phone] [phone] Your Updated Medication List  
  
   
This list is accurate as of: 11/28/17  9:00 AM.  Always use your most recent med list.  
  
  
  
  
 aspirin 81 mg chewable tablet Take 81 mg by mouth daily. atorvastatin 40 mg tablet Commonly known as:  LIPITOR Take 40 mg by mouth daily. diclofenac EC 75 mg EC tablet Commonly known as:  VOLTAREN  
TAKE 1 TABLET BY MOUTH TWICE A DAY  
  
 lidocaine-prilocaine topical cream  
Commonly known as:  EMLA Apply  to affected area as needed for Pain. lisinopril 5 mg tablet Commonly known as:  Florahome Escort Take 5 mg by mouth daily. metoprolol succinate 25 mg XL tablet Commonly known as:  TOPROL-XL Take 25 mg by mouth daily. MULTIVITAMIN PO Take  by mouth. For men 48 +. Takes one po every other day. PROCTOSOL HC 2.5 % rectal cream  
Generic drug:  hydrocortisone USE 1 APPLICATION TOPICALLY 3 TIMES DAILY * sildenafil citrate 50 mg tablet Commonly known as:  VIAGRA Take  by mouth as needed. Indications: unsure of dosage * sildenafil 20 mg tablet Commonly known as:  REVATIO  
TAKE 1 TO 5 TABLETS EVERY DAY IF NEEDED FOR SEXUAL ACTIVITY  
  
 tiZANidine 2 mg tablet Commonly known as:  Corlis Ax TAKE 1 TABLET BY MOUTH 3 TIMES A DAY AS NEEDED  
  
 TYLENOL EXTRA STRENGTH 500 mg tablet Generic drug:  acetaminophen Take 500 mg by mouth as needed for Pain. * Notice: This list has 2 medication(s) that are the same as other medications prescribed for you. Read the directions carefully, and ask your doctor or other care provider to review them with you. To-Do List   
 12/20/2017 8:00 AM  
  Appointment with [facility] PAT ROOM P1 at [place] [address] ([phone]) Patient Instructions Surgery Instruction Sheet You have been scheduled for surgery on 12/28/2017 at 1:30pm at [facility].. Please report to the Surgery Center at 11:30am, this is approximately 2 hours prior to your surgery time and is subject to change. The Surgery Center is located on the [address] [place] [address] side of the [facility], just next to the Emergency Room. Reserved parking is available and  parking if lot is full. You will need to have a Pre-op Visit prior to your surgery.   Report to the Surgery Center on 12/20/2017 at 8:00am.  Bring a list of medications and your insurance cards with you. You may eat/drink prior to this visit. Call your physician immediately if you notice a change in your health between the time you saw your physician and the day of surgery. If you take a blood thinner, please let us know. Call your ordering Doctor to make sure you can stop taking it prior to your surgery. STOP YOUR ASPIRIN 10 DAYS PRIOR TO SURGERY. DO NOT TAKE  IBUPROFEN, ADVIL, MOTRIN, ALEVE, EXCEDRIN, BC POWDER, GOODIES, FISH OIL OR ANY MEDICATION CONTAINING ASPIRIN 10 DAYS PRIOR TO YOUR SURGERY. MAY TAKE TYLENOL. Eat a light dinner the evening before your surgery. DO NOT EAT OR DRINK ANYTHING AFTER MIDNIGHT THE NIGHT BEFORE YOUR SURGERY. This includes water, chewing gum, lifesavers, etc.  The Pre op nurse will check with you about any medication that you may need to take the morning of surgery. Shower with a new bar of anti-bacterial soap (Dial, Safeguard) or solution given to you by Pre-op, the night before surgery. Do not use lotion, powder, deodorant on the skin after showering. Wear loose, comfortable clothing the day of surgery and bring a container to store your contacts, eyeglasses, dentures, hearing aid, etc.  Do not bring money, valuables, jewelry, etc. to the hospital.   
 
If you are having outpatient surgery, someone must come with you the morning of surgery to drive you home. You can not drive for 24 hours after any anesthesia. Sometimes it is necessary to stay overnight and leave the next morning. This is still considered outpatient for most insurance deductibles. Someone will still need to drive you home. If you have questions or concerns, please feel free to call Dr Shanae Downing at [phone]. If you need to cancel your surgery, please call as soon as possible. Introducing [facility]! Dear Alvaro Kenney: Thank you for requesting a ScriptPad account.   Our records indicate that you already have an active InGaugeIt account. You can access your account anytime at [email or web address] Did you know that you can access your hospital and ER discharge instructions at any time in InGaugeIt? You can also review all of your test results from your hospital stay or ER visit. Additional Information If you have questions, please visit the Frequently Asked Questions section of the InGaugeIt website at [email or web address] Remember, InGaugeIt is NOT to be used for urgent needs. For medical emergencies, dial 911. Now available from your iPhone and Android! Please provide this summary of care documentation to your next provider. Your primary care clinician is listed as Inocencia Antonio. If you have any questions after today's visit, please call [phone].

## 2017-12-20 NOTE — ADVANCED PRACTICE NURSE
JAIDEN score of 4 in PAT assessment. Pt admits to snoring but denies ever having been advised that he has periods of apnea while sleeping or ever having been referred for a sleep apnea evaluation. Adverse reactions to anesthesia include \"weird dreams\" after surgery in 2012, 2015 but states he has had anesthesia x 2 since then without incident. Denies decreased cervical ROM. Has full upper dentures, partial lower dentures.

## 2017-12-20 NOTE — PERIOP NOTES
[facility]  Preoperative Instructions        Surgery Date 12/28/2017          Time of Arrival 0545 am Contact # [phone]    1. On the day of your surgery, please report to the Surgical Services Registration Desk and sign in at your designated time. The Surgery Center is located to the right of the Emergency Room. 2. You must have someone with you to drive you home. You should not drive a car for 24 hours following surgery. Please make arrangements for a friend or family member to stay with you for the first 24 hours after your surgery. 3. Do not have anything to eat or drink (including water, gum, mints, coffee, juice) after midnight ?? .? This may not apply to medications prescribed by your physician. ?(Please note below the special instructions with medications to take the morning of your procedure.)    4. We recommend you do not drink any alcoholic beverages for 24 hours before and after your surgery. 5. Contact your surgeons office for instructions on the following medications: non-steroidal anti-inflammatory drugs (i.e. Advil, Aleve), vitamins, and supplements. (Some surgeons will want you to stop these medications prior to surgery and others may allow you to take them)  **If you are currently taking Plavix, Coumadin, Aspirin and/or other blood-thinning agents, contact your surgeon for instructions. ** Your surgeon will partner with the physician prescribing these medications to determine if it is safe to stop or if you need to continue taking. Please do not stop taking these medications without instructions from your surgeon    6. Wear comfortable clothes. Wear glasses instead of contacts. Do not bring any money or jewelry. Please bring picture ID, insurance card, and any prearranged co-payment or hospital payment. Do not wear make-up, particularly mascara the morning of your surgery. Do not wear nail polish, particularly if you are having foot /hand surgery. Wear your hair loose or down, no ponytails, buns, masha pins or clips. All body piercings must be removed. Please shower with antibacterial soap for three consecutive days before and on the morning of surgery, but do not apply any lotions, powders or deodorants after the shower on the day of surgery. Please use a fresh towels after each shower. Please sleep in clean clothes and change bed linens the night before surgery. Please do not shave for 48 hours prior to surgery. Shaving of the face is acceptable. 7. You should understand that if you do not follow these instructions your surgery may be cancelled. If your physical condition changes (I.e. fever, cold or flu) please contact your surgeon as soon as possible. 8. It is important that you be on time. If a situation occurs where you may be late, please call [phone] (OR Holding Area). 9. If you have any questions and or problems, please call [phone] (Pre-admission Testing). 10. Your surgery time may be subject to change. You will receive a phone call the evening prior if your time changes. 11.  If having outpatient surgery, you must have someone to drive you here, stay with you during the duration of your stay, and to drive you home at time of discharge. 12.   In an effort to improve the efficiency, privacy, and safety for all of our Pre-op patients visitors are not allowed in the Holding area. Once you arrive and are registered your family/visitors will be asked to remain in the waiting room. The Pre-op staff will get you from the Surgical Waiting Area and will explain to you and your family/visitors that the Pre-op phase is beginning. The staff will answer any questions and provide instructions for tracking of the patient, by use of the existing tracking number and color-coded status board in the waiting room.   At this time the staff will also ask for your designated spokesperson information in the event that the physician or staff need to provide an update or obtain any pertinent information. The designated spokesperson will be notified if the physician needs to speak to family during the pre-operative phase. If at any time your family/visitors has questions or concerns they may approach the volunteer desk in the waiting area for assistance. Special Instructions:    MEDICATIONS TO TAKE THE MORNING OF SURGERY WITH A SIP OF WATER:tylenol if needed, claritin, ,metoprolol, saline nasal spray      I understand a pre-operative phone call will be made to verify my surgery time. In the event that I am not available, I give permission for a message to be left on my answering service and/or with another person?   Yes       ___________________      __________   _________    (Signature of Patient)             (Witness)                (Date and Time)

## 2017-12-20 NOTE — ADVANCED PRACTICE NURSE
EKG from 12/20/17 reviewed by Dr. Germain Gordon, anesthesiologist and compared with previous EKGs from 08/03/17, 7/25/17 and 03/10/17. OV from evaluation by Dr. Mendel Paddy at [address] [place] [address] 3/10/17 reviewed by Dr. Germain Gordon. METS > 4. PMHx reviewed with Dr. Germain Gordon as well as planned procedure. Pt ok to proceed with surgery.

## 2017-12-28 PROBLEM — C78.7 COLON CANCER METASTASIZED TO LIVER (HCC): Status: ACTIVE | Noted: 2017-01-01

## 2017-12-28 PROBLEM — C18.9 COLON CANCER METASTASIZED TO LIVER (HCC): Status: ACTIVE | Noted: 2017-01-01

## 2017-12-28 NOTE — PERIOP NOTES
1105- Report received from ANICETO Orantes RN. 1215- Family updated. 1325-TRANSFER - OUT REPORT:    Verbal report given to [facility] RN(name) on Anastasiya Noguera  being transferred to gen surg(unit) for routine post - op       Report consisted of patients Situation, Background, Assessment and   Recommendations(SBAR). Information from the following report(s) SBAR, Kardex, OR Summary, Procedure Summary, Intake/Output, MAR and Recent Results was reviewed with the receiving nurse. Opportunity for questions and clarification was provided.       Patient transported with:   O2 @ 4 liters  Tech

## 2017-12-28 NOTE — IP AVS SNAPSHOT
[address] [facility] 
[phone] Patient: aMrk Fatima MRN: [id number] GCN:66/19/[birth year]19 About your hospitalization You were admitted on:  December 28, 2017 You last received care in the:  [facility] 2 GENERAL SURGERY You were discharged on:  December 29, 2017 Why you were hospitalized Your primary diagnosis was:  Not on File Your diagnoses also included:  Colon Cancer Metastasized To Liver (Hcc) Things You Need To Do (next 8 weeks) Monday Abhishek 15, 2018 POST OP with Heather Salinas MD at 10:00 AM  
Where:  [facility] Dr. Everett Gray ([address]) Discharge Orders None A check heber indicates which time of day the medication should be taken. My Medications TAKE these medications as instructed Instructions Each Dose to Equal  
 Morning Noon Evening Bedtime  
 aspirin 81 mg chewable tablet Your next dose is:  January 1st  
   
 Take 81 mg by mouth daily. 81 mg  
    
   
   
   
  
 atorvastatin 40 mg tablet Commonly known as:  LIPITOR Your last dose was: This morning at 9:20am  
   
 Take 40 mg by mouth daily. 40 mg CLARITIN 10 mg tablet Generic drug:  loratadine Your last dose was: This morning at 9:20am  
   
 Take 10 mg by mouth daily. 10 mg  
    
   
   
   
  
 lidocaine-prilocaine topical cream  
Commonly known as:  EMLA Your last dose was: This morning at 9:20am  
   
 Apply  to affected area as needed for Pain. lisinopril 5 mg tablet Commonly known as:  Henrietta Primmer Your last dose was: This morning at 9:20am  
   
 Take 5 mg by mouth daily. 5 mg  
    
   
   
   
  
 metoprolol succinate 25 mg XL tablet Commonly known as:  TOPROL-XL Your last dose was: This morning at 9:20am  
   
 Take 25 mg by mouth daily.   
 25 mg  
    
   
   
   
  
 MULTIVITAMIN PO Take 1 Tab by mouth daily. For men 48 +. Takes one po every day. 1 Tab  
    
   
   
   
  
 oxyCODONE-acetaminophen 5-325 mg per tablet Commonly known as:  PERCOCET Your last dose was:  Pain medication given at 4pm  
   
 Take 1-2 Tabs by mouth every six (6) hours as needed for Pain for up to 20 days. Max Daily Amount: 8 Tabs. 1-2 Tab PROCTOSOL HC 2.5 % rectal cream  
Generic drug:  hydrocortisone Your last dose was: This morning at 9:20am  
   
 USE 1 APPLICATION TOPICALLY  DAILY  
     
   
   
   
  
 senna-docusate 8.6-50 mg per tablet Commonly known as:  SENNA-S Take 1 Tab by mouth daily for 30 days. 1 Tab  
    
   
   
   
  
 sildenafil (antihypertensive) 20 mg tablet Commonly known as:  REVATIO  
   
 TAKE 1 TABLETS EVERY DAY  
     
   
   
   
  
 sodium chloride 0.65 % nasal spray Commonly known as:  OCEAN  
   
 2 Sprays by Both Nostrils route as needed for Congestion. 2 Spray TYLENOL EXTRA STRENGTH 500 mg tablet Generic drug:  acetaminophen Take 500 mg by mouth as needed for Pain. 500 mg Where to Get Your Medications These medications were sent to [facility]/pharmacy #[phone], [facility] [address], [address] [place] [address] Hours:  24-hours Phone:  [phone]  
  senna-docusate 8.6-50 mg per tablet Information on where to get these meds will be given to you by the nurse or doctor. ! Ask your nurse or doctor about these medications  
  oxyCODONE-acetaminophen 5-325 mg per tablet Discharge Instructions Discharge Instructions:  Laparoscopic partial right hepatic lobectomy Dr. Orlando Edwards Call for appointment for follow up in 2 weeks [phone] Activity: 
 
Walk regularly. No lifting more than 10 -15 pounds for 4 weeks.   Light aerobic activity is okay when you feel up to it. You may resume driving in three days unless still requiring narcotics for pain. Work: 
 
You may return to work in 1 or 2 weeks to light activity. No lifting more than 10 pounds for four weeks. Diet: 
 
You may resume normal diet after 24 hours. Fatty foods may still cause some stomach upset. Wound Care: You have a special dressing called Dermabond. It is okay to shower and let the water run over the incisions but do not scrub the area or soak in a tub. If you have a small amount of drainage you may place a dry bandage over the wound and change it daily. If you experience a lot of drainage, develop redness around the wound, or a fever over 101 F occurs please call the office. Medications: 
 
Resume home medications as indicated on the Medical Reconciliation form. Aspirin and Coumadin can be restarted 1/1/2018 if you were taking them preoperatively. Pain medications:   A narcotic prescription will also be given for breakthrough pain. It has tylenol in the narcotic so you cannot take both together. Over the counter stool softeners and laxatives may be used if needed. Narcotics and anesthesia sometimes cause nausea and vomiting. If persistent please call the office. Do not hesitate to call with questions or concerns. Introducing [facility]! Dear Mary Jane Deleon: Thank you for requesting a Tyros account. Our records indicate that you already have an active Tyros account. You can access your account anytime at [email or web address] Did you know that you can access your hospital and ER discharge instructions at any time in Tyros? You can also review all of your test results from your hospital stay or ER visit. Additional Information If you have questions, please visit the Frequently Asked Questions section of the Tyros website at [email or web address] Remember, MyChart is NOT to be used for urgent needs. For medical emergencies, dial 911. Now available from your iPhone and Android! Providers Seen During Your Hospitalization Provider Specialty Primary office phone Terence Goltz, MD General Surgery [phone] Your Primary Care Physician (PCP) Primary Care Physician Office Phone Office Fax Karoline Estrada [phone] You are allergic to the following Allergen Reactions Plavix (Clopidogrel) Rash Hives and itching Recent Documentation Height Weight BMI Smoking Status 1.778 m 101.8 kg 32.2 kg/m2 Former Smoker Emergency Contacts Name Discharge Info Relation Home Work Mobile Rozina,Jocelynn DISCHARGE CAREGIVER [3] Neighbor [4] [phone] Asia Martinez DISCHARGE CAREGIVER [3] Child [2] [phone] Patient Belongings The following personal items are in your possession at time of discharge: 
  Dental Appliances: None  Visual Aid: Glasses, With patient (for reading)      Home Medications: None   Jewelry: None  Clothing: Pants, Shirt, Footwear, Undergarments, Socks, Jacket/Coat    Other Valuables:  (id cards with friend outside ) Please provide this summary of care documentation to your next provider. Signatures-by signing, you are acknowledging that this After Visit Summary has been reviewed with you and you have received a copy. Patient Signature:  ____________________________________________________________ Date:  ____________________________________________________________  
  
Cindy Bain Provider Signature:  ____________________________________________________________ Date:  ____________________________________________________________

## 2017-12-28 NOTE — OP NOTES
[facility] OP NOTE    Madan Stewart  MR#: [id number]  : 1950  ACCOUNT #: [de-identified]   DATE OF SERVICE: 2017    PREOPERATIVE DIAGNOSIS:  Metastatic colon cancer to the liver. POSTOPERATIVE DIAGNOSIS:  Metastatic colon cancer to the liver. PROCEDURES PERFORMED:  Laparoscopic partial hepatic lobectomy x2 with intraoperative ultrasound guidance. SURGEON: Mulu Garcia MD    ANESTHESIA:  General.    ESTIMATED BLOOD LOSS: 200 mL. COMPLICATIONS:  None. SPECIMENS REMOVED:  1. Right hepatic lobe inferiorly. 2.  Right hepatic lobe superiorly, both in segment 8. FINDINGS:  Two lesions in the right lobe of the liver in the dome in segment 8. BRIEF HISTORY:  The patient is a pleasant 34-year-old gentleman who presented in July with obstructing colon cancer and was found to have liver mets at the time. He has received adjuvant chemotherapy now after resection and now presents for elective resection of 2 liver lesions. Options were discussed and he understood the risks and benefits and wished to proceed with resection. DESCRIPTION OF PROCEDURE: The patient was taken to the operating room and placed on the operating table in supine position, underwent general anesthesia. The abdomen was prepped and draped in usual sterile fashion. After appropriate timeout, antibiotics were given. An incision was made. Local anesthetic was injected into the skin, subcutaneous tissue above the umbilicus. Vertical midline incision was made along the midline scar and a 5 mm direct vision trocar was inserted into the peritoneal cavity and insufflation begun with 50 mmHg pressure yielding good visualization. There were a moderate amount of adhesions. We were able to identify and place it in the right upper quadrant, placed two 5 mm trocars in the right upper quadrant, then took down the adhesions toward the midline inferiorly.   We then placed a 10/12 trocar in the subxiphoid position. Once that was completed, we took down some adhesions, and then utilizing ultrasound we had a drop-in ultrasound that was placed and identified both lesions and they are fairly superficial in segment 8 of the liver in the dome, but  by about 5 cm. Once that was completed we marked the areas and then put a stitch around in each one to elevate it up, cauterized the capsule, and then with a Habib radiofrequency device we ablated the tissue with a good margin around each lesion, utilizing the Habib to go through the tissue, and then using scissors to divide it, we took out the first segment, came out intact. The second piece, which was the right inferior piece, came out first.  The second piece then was taken down as well. Unfortunately, the suture pulled through and took off a little piece of the tumor itself, but then we took a wide excision of the area. Of note, his liver did look a little bit of hepatic change, maybe some early cirrhosis. Ultimately, I took out both pieces, placed in an Endobag, and  them out as I was able to tell which was which, copiously irrigated out the area above the liver, controlled the bleeding with a combination of electrocautery and the [facility] radiofrequency device. I then placed Tisseel over the liver bed. Once that was completed, we looked inferiorly. We saw that one of the adhesions had a little arterial vessel that had been bleeding during the operation and this was controlled with electrocautery and suctioned and irrigated out. Once all that was completed, needle, sponge and instrument counts were correct and CO2 was evacuated from the abdominal cavity and trocars were removed. Interrupted 0 Vicryl was used to reapproximate the subxiphoid 10/12 trocar site. Once that was completed, more Marcaine was infiltrated around all the incisions and running 4-0 Vicryl was used to close the incisions and Dermabond dressings applied.   Upon conclusion of the operation, needle, sponge and instrument counts were correct x2. The patient tolerated the procedure well and was brought to recovery room. MD Hollie Portillo / Everton Tamez  D: 12/28/2017 10:55     T: 12/28/2017 13:00  JOB #: [id number]  CC: Sriram Espino MD  CC: Brigido Alonso MD

## 2017-12-28 NOTE — PERIOP NOTES
Handoff Report from Operating Room to PACU    Report received from RADHIKA Lorenzana and SIRI Sequeira CRNA regarding Gabriele Croft. Surgeon(s):  Anthony Shearer MD  And Procedure(s) (LRB):  LAPAROSCOPIC PARTIAL HEPATIC LOBECTOMY WITH INTRAOPERTIVE ULTRASOUND GUIDANCE  (N/A)  confirmed   with allergies and dressings discussed. Anesthesia type, drugs, patient history, complications, estimated blood loss, vital signs, intake and output, and blood products received, last pain medication, lines, reversal medications and temperature were reviewed.

## 2017-12-28 NOTE — BRIEF OP NOTE
BRIEF OPERATIVE NOTE    Date of Procedure: 12/28/2017   Preoperative Diagnosis: METASTATIC COLON CANCER TO THE LIVER   Postoperative Diagnosis: METASTATIC COLON CANCER TO THE LIVER     Procedure(s):  LAPAROSCOPIC PARTIAL HEPATIC LOBECTOMY WITH INTRAOPERTIVE ULTRASOUND GUIDANCE   Surgeon(s) and Role:     * eHnrietta Worrell MD - Primary         Assistant Staff:       Surgical Staff:  Circ-1: Humaira Magallon RN  Scrub Tech-1: [facility]  Surg Asst-1: [facility]  Event Time In   Incision Start 0802   Incision Close 1042     Anesthesia: General   Estimated Blood Loss: 200 ml  Specimens:   ID Type Source Tests Collected by Time Destination   1 : Segment 8 Inferior Preservative Liver  Henrietta Worrell MD 12/28/2017 1026 Pathology   2 : Segment 8 Superior Preservative Liver  Henrietta Worrell MD 12/28/2017 1026 Pathology      Findings: two lesions in the right lobe of the liver near dome in segment 8 were identified with US guidance   Complications: none  Implants: * No implants in log *

## 2017-12-28 NOTE — ANESTHESIA POSTPROCEDURE EVALUATION
Post-Anesthesia Evaluation and Assessment    Patient: Pancho Aldrich MRN: [id number]  SSN: [id number]    YOB: 1950  Age: 75 y.o. Sex: male       Cardiovascular Function/Vital Signs  Visit Vitals    /75    Pulse 82    Temp 37.2 °C (98.9 °F)    Resp 15    Ht 5' 10\" (1.778 m)    Wt 99.3 kg (218 lb 14.7 oz)    SpO2 91%    BMI 31.41 kg/m2       Patient is status post general anesthesia for Procedure(s):  LAPAROSCOPIC PARTIAL HEPATIC LOBECTOMY WITH INTRAOPERTIVE ULTRASOUND GUIDANCE . Nausea/Vomiting: None    Postoperative hydration reviewed and adequate. Pain:  Pain Scale 1: FLACC (12/28/17 1200)  Pain Intensity 1: 0 (12/28/17 1200)   Managed    Neurological Status:   Neuro (WDL): Exceptions to WDL (12/28/17 1100)  Neuro  Neurologic State: Drowsy; Eyes open to voice (12/28/17 1100)  Orientation Level: Oriented X4 (12/28/17 1100)  Cognition: Follows commands (12/28/17 1100)  Speech: Clear (12/28/17 1100)  LUE Motor Response: Purposeful (12/28/17 1100)  LLE Motor Response: Purposeful (12/28/17 1100)  RUE Motor Response: Purposeful (12/28/17 1100)  RLE Motor Response: Purposeful (12/28/17 1100)   At baseline    Mental Status and Level of Consciousness: Arousable    Pulmonary Status:   O2 Device: Nasal cannula (12/28/17 1200)   Adequate oxygenation and airway patent    Complications related to anesthesia: None    Post-anesthesia assessment completed.  No concerns    Signed By: Daisy Joe MD     December 28, 2017

## 2017-12-28 NOTE — H&P (VIEW-ONLY)
HISTORY OF PRESENT ILLNESS  Eric Silva is a 75 y.o. male who comes in for consultation by Dr Alex Sands for metastatic colon cancer  HPI  He underwent emergent right colectomy and liver biopsy for a [id number] poorly differentiated adenocarcinoma with signet ring features in July 2017 by Dr Amy Jones. He has been receiving adjuvant chemotherapy with DR Alex Sands since then. He has two liver lesions which have reduced in size. He now comes in to discuss resection of the liver lesions. He is feeling ok but he had a neulasta shot and is still feeling weak in the legs. He last had chemotherapy on 11/9. He denies jaundice, fever, chills, sweats, nausea, vomiting, diarrhea, constipation, melena, hematochezia, dysuria. He also has a hx of prostate cancer dx 3/2017 ([address] [place] [address] and underwent a RA prostatectomy. He has also had a throat cancer treated by DR Brayden Petersen.     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) - surgery    Cancer ([address]     Prostate Cancer    Carotid artery occlusion     reports 100% occlusion on one side and 55-70% blockage in the over side (this has not changed in at least 5-6 yrs)/has doppler study on neck 2 times a yr    Chronic obstructive pulmonary disease (HCC)     Chronic pain     r/t arthritis    Congestive heart failure ([address]     Hyperlipidemia     Hypertension     S/P CABG x 3 12/6/2012     Past Surgical History:   Procedure Laterality Date    ABDOMEN SURGERY PROC UNLISTED      CABG, ARTERY-VEIN, THREE  12/6/2012    catherization    COLONOSCOPY N/A 8/19/2016    COLONOSCOPY performed by Macrina H[facility] at [facility]    HX APPENDECTOMY      HX GI      colonoscopy 2012 - polyp    HX HEENT      tonsillectomy    HX HEENT  2015    Mouth Cancer surgery    HX ORTHOPAEDIC Left     hip replacement     HX ORTHOPAEDIC      plantar warts removed    HX ORTHOPAEDIC Right     foot    HX OTHER SURGICAL  12/3/15    bx right side of mouth/cancer removed with lymph nodes in neck removed     Family History   Problem Relation Age of Onset    Heart Disease Mother     Hypertension Mother     Diabetes Mother     Psychiatric Disorder Father     Asthma Daughter     Anesth Problems Neg Hx      Social History   Substance Use Topics    Smoking status: Former Smoker     Packs/day: 1.50     Years: 50.00     Quit date: 12/6/2012    Smokeless tobacco: Former User    Alcohol use 12.0 oz/week     20 Cans of beer per week     Current Outpatient Prescriptions   Medication Sig    diclofenac EC (VOLTAREN) 75 mg EC tablet TAKE 1 TABLET BY MOUTH TWICE A DAY    sildenafil (REVATIO) 20 mg tablet TAKE 1 TO 5 TABLETS EVERY DAY IF NEEDED FOR SEXUAL ACTIVITY    tiZANidine (ZANAFLEX) 2 mg tablet TAKE 1 TABLET BY MOUTH 3 TIMES A DAY AS NEEDED    PROCTOSOL HC 2.5 % rectal cream USE 1 APPLICATION TOPICALLY 3 TIMES DAILY    lidocaine-prilocaine (EMLA) topical cream Apply  to affected area as needed for Pain.  aspirin 81 mg chewable tablet Take 81 mg by mouth daily.  sildenafil citrate (VIAGRA) 50 mg tablet Take  by mouth as needed. Indications: unsure of dosage    atorvastatin (LIPITOR) 40 mg tablet Take 40 mg by mouth daily.  lisinopril (PRINIVIL, ZESTRIL) 5 mg tablet Take 5 mg by mouth daily.  MULTIVITAMIN PO Take  by mouth. For men 48 +. Takes one po every other day.  metoprolol succinate (TOPROL-XL) 25 mg XL tablet Take 25 mg by mouth daily.  acetaminophen (TYLENOL EXTRA STRENGTH) 500 mg tablet Take 500 mg by mouth as needed for Pain. No current facility-administered medications for this visit.       Allergies   Allergen Reactions    Plavix [Clopidogrel] Rash       Review of Systems   Constitutional: Negative for chills, diaphoresis, fever, malaise/fatigue and weight loss. HENT: Negative for congestion, ear pain and sore throat. Eyes: Negative for blurred vision and pain. Respiratory: Negative for cough, hemoptysis, sputum production, shortness of breath, wheezing and stridor. Cardiovascular: Negative for chest pain, palpitations, orthopnea, claudication, leg swelling and PND. Gastrointestinal: Negative for abdominal pain, blood in stool, constipation, diarrhea, heartburn, melena, nausea and vomiting. Genitourinary: Negative for dysuria, flank pain, frequency, hematuria and urgency. Musculoskeletal: Negative for back pain, joint pain, myalgias and neck pain. Skin: Negative for itching and rash. Neurological: Negative for dizziness, tremors, focal weakness, seizures, weakness and headaches. Endo/Heme/Allergies: Negative for polydipsia. Psychiatric/Behavioral: Negative for depression and memory loss. The patient is not nervous/anxious. Visit Vitals    /64    Pulse 80    Ht 5' 10\" (1.778 m)    Wt 92.5 kg (204 lb)    SpO2 92%    BMI 29.27 kg/m2       Physical Exam   Constitutional: He is oriented to person, place, and time. He appears well-developed and well-nourished. No distress. HENT:   Head: Normocephalic and atraumatic. Mouth/Throat: Oropharynx is clear and moist. No oropharyngeal exudate. Eyes: Conjunctivae and EOM are normal. Pupils are equal, round, and reactive to light. No scleral icterus. Neck: Normal range of motion. Neck supple. No tracheal deviation present. No thyromegaly present. Cardiovascular: Normal rate, regular rhythm and normal heart sounds. Exam reveals no gallop and no friction rub. No murmur heard. Pulmonary/Chest: Effort normal and breath sounds normal. No stridor. No respiratory distress. He has no wheezes. He has no rales. Abdominal: Soft.  Normal appearance and bowel sounds are normal. He exhibits no distension, no pulsatile liver and no mass. There is no hepatosplenomegaly. There is no tenderness. There is no rebound, no guarding and no CVA tenderness. No hernia. Hernia confirmed negative in the ventral area, confirmed negative in the right inguinal area and confirmed negative in the left inguinal area. Genitourinary: Testes normal and penis normal. Cremasteric reflex is present. Circumcised. Musculoskeletal: Normal range of motion. He exhibits no edema or tenderness. Lymphadenopathy:     He has no cervical adenopathy. Right: No inguinal adenopathy present. Left: No inguinal adenopathy present. Neurological: He is alert and oriented to person, place, and time. No cranial nerve deficit. Coordination normal.   Skin: Skin is warm and dry. No rash noted. He is not diaphoretic. No erythema. Psychiatric: He has a normal mood and affect. His behavior is normal. Judgment and thought content normal.     I reviewed his CT images from July, August and November 2017  ASSESSMENT and PLAN  1.  Stage IV colon cancer with liver mets. S/P adjuvant chemotherapy for 3 months. I explained to him about liver lesions and options for observation and resection. Best option is resection as there are only two that are known and then to undergo more chemotherapy. The lesions appear to be superficial in the dome . Risks of resection include, but are not limited to, bleeding, transfusion, bile leak, liver failure, sepsis, DVT/PE, urinary/pulmonary/cardiac/CNS complications, hernia, death. 2.  Hx prostate cancer. JARVIS per pt. Seeing DR Rosa M Marrero' today  3. Hx head and neck cancer. JARVIS per pt. Followed by Dr Melecio Fischer May  4. Essential hypertension  5.   Hyperlipidemia    He desires a laparoscopic possible open partial right hepatic lobectomy with intraoperative US guidance under general anesthesia as an inpatient  Will need HABIB radiofrequency ablation device to manage the liver bed.     Oneida Oropeza MD FACS

## 2017-12-28 NOTE — PROGRESS NOTES
Patient refused SCD stockings stating he feels that they do more harm then they do help. Patient was explained the importance of the SCD stockings and how they prevent blood clots after surgery, patient verbalized understanding. Patient stated that he will not wear stocking unless Dr. Ena Au absolutely insists that he does.

## 2017-12-28 NOTE — DISCHARGE INSTRUCTIONS
Discharge Instructions:  Laparoscopic partial right hepatic lobectomy  Dr. Paula Jaramillo    Call for appointment for follow up in 2 weeks [phone]    Activity:    Walk regularly. No lifting more than 10 -15 pounds for 4 weeks. Light aerobic activity is okay when you feel up to it. You may resume driving in three days unless still requiring narcotics for pain. Work:    You may return to work in 1 or 2 weeks to light activity. No lifting more than 10 pounds for four weeks. Diet:    You may resume normal diet after 24 hours. Fatty foods may still cause some stomach upset. Wound Care: You have a special dressing called Dermabond. It is okay to shower and let the water run over the incisions but do not scrub the area or soak in a tub. If you have a small amount of drainage you may place a dry bandage over the wound and change it daily. If you experience a lot of drainage, develop redness around the wound, or a fever over 101 F occurs please call the office. Medications:    Resume home medications as indicated on the Medical Reconciliation form. Aspirin and Coumadin can be restarted 1/1/2018 if you were taking them preoperatively. Pain medications:   A narcotic prescription will also be given for breakthrough pain. It has tylenol in the narcotic so you cannot take both together. Over the counter stool softeners and laxatives may be used if needed. Narcotics and anesthesia sometimes cause nausea and vomiting. If persistent please call the office. Do not hesitate to call with questions or concerns.

## 2017-12-28 NOTE — PERIOP NOTES
Dr. Eliza Brannon to beside would  like an additional iv site placed. Placed   To right forearm by myself  #18g with out problems one stick  nicky bermudez. Md informed.

## 2017-12-28 NOTE — INTERVAL H&P NOTE
H&P Update:  Camila Pabon was seen and examined. History and physical has been reviewed. The patient has been examined.  There have been no significant clinical changes since the completion of the originally dated History and Physical.    Signed By: Houston West MD     December 28, 2017 2:47 PM

## 2017-12-28 NOTE — PROGRESS NOTES
Ambulated in the hallway. Patient had some discomfort that he described as slight cramping. Patient returned back to bed stating he doesn't feel up to sitting in the chair at this time. Monitoring is ongoing.

## 2017-12-28 NOTE — ANESTHESIA PREPROCEDURE EVALUATION
Anesthetic History   No history of anesthetic complications            Review of Systems / Medical History  Patient summary reviewed, nursing notes reviewed and pertinent labs reviewed    Pulmonary    COPD: mild               Neuro/Psych   Within defined limits           Cardiovascular    Hypertension          CAD and CABG    Exercise tolerance: >4 METS     GI/Hepatic/Renal           Liver disease     Endo/Other        Arthritis and cancer (Colon with Liver mets)     Other Findings              Physical Exam    Airway  Mallampati: II  TM Distance: 4 - 6 cm  Neck ROM: normal range of motion   Mouth opening: Normal     Cardiovascular    Rhythm: regular  Rate: normal         Dental    Dentition: Full upper dentures and Lower partial plate     Pulmonary  Breath sounds clear to auscultation               Abdominal  GI exam deferred       Other Findings            Anesthetic Plan    ASA: 3  Anesthesia type: general          Induction: Intravenous  Anesthetic plan and risks discussed with: Patient

## 2017-12-28 NOTE — PERIOP NOTES
Spoke with lab personnel this am to ensure 4 units  Blood available they have 2 ready to go and as we take the blood will have total of 4 available.

## 2017-12-29 NOTE — PROGRESS NOTES
Spiritual Care Partner Volunteer visited patient on 12/29/17. Documented by:    Donna Gutierrez M.S.   Spiritual Care Department  If needs arise please call Hanane-PRACAMRON ([phone])

## 2017-12-29 NOTE — PROGRESS NOTES
Pt states he has voided small amounts in commode, several times. Need to measure output and do post-void scan was reinforced. Pt verbalized understanding. Is presently eating lunch and in NAD.    1500 Pt voided 300ml - bladder scan showed 107ml PVR.

## 2017-12-29 NOTE — PROGRESS NOTES
Interdisciplinary Rounds were completed on this patient. Rounds included nursing, clinical care leader, pharmacy, and case management. Patient was doing well without problems. Patient had the following concerns: none.      Goals for the day will include: mobilize, voiding trial    Anticipated day of discharge: 12/29/2017pm

## 2017-12-29 NOTE — PROGRESS NOTES
Pt is a 75 y.o.  male,admitte for colon cancer. Pt was alert and oriented, upon CM room visit. Pt reported that he resides alone in his one story apartment home (no steps into main entrance). Pt reported that he is independent with ADLs, and drives. Pt reported that he is active with PCP: see twice a yr, last seen in Sept 2017 for physical.  Pt reported that he uses [facility]). Pt reported that he has DME at home: cane. Pt reported that he has ad home health in the past, but reported no SNF. Pt will have transportation and family support at d/c. CM will continue to follow up with pt and make referrals as deemed necessary. Care Management Interventions  PCP Verified by CM: Yes  Mode of Transport at Discharge:  Other (see comment)  Transition of Care Consult (CM Consult): Discharge Planning  Discharge Durable Medical Equipment: No  Physical Therapy Consult: No  Occupational Therapy Consult: No  Current Support Network: Own Home, Lives Alone  Confirm Follow Up Transport: Family  Plan discussed with Pt/Family/Caregiver: Yes  Discharge Location  Discharge Placement: [facility] [address], MSW   [phone]

## 2017-12-29 NOTE — PROGRESS NOTES
Discharge instructions reviewed with pt and a friend at the bedside (pt stated ok to review instructions with her present). Denied further questions/concerns. Will resume ASA on 1/1 and follow up as scheduled. Wheelchair to lobby.

## 2017-12-29 NOTE — PROGRESS NOTES
Patient IV site on right forearm 18G swelling. Not cool to the touch. No erythema. IV removed. Arm elevated. Warm towels placed on site.

## 2017-12-29 NOTE — PROGRESS NOTES
Admit Date: 2017    POD 1 Day Post-Op    Procedure:  Procedure(s):  LAPAROSCOPIC PARTIAL HEPATIC LOBECTOMY WITH INTRAOPERTIVE ULTRASOUND GUIDANCE       Assessment:   Active Problems:    Colon cancer metastasized to liver ([address] (2017)      1. Feels ok but some pain  2. Post op urinary retention. Hx prostatectomy  3. Hypoxia. Room air SaO2 94% preop      Plan/Recommendations/Medical Decision Makin. Pulmonary toilet  2. Advance diet  3. Remove chance-voiding trial.  Check PVR bladder scan later today  4. Home later if able to wean off oxygen    Subjective:     Patient has complaints of pain. Objective:     Blood pressure 129/84, pulse 91, temperature 97.7 °F (36.5 °C), resp. rate 17, height 5' 10\" (1.778 m), weight 99.3 kg (218 lb 14.7 oz), SpO2 90 %. Temp (24hrs), Av.1 °F (36.7 °C), Min:97.6 °F (36.4 °C), Max:98.9 °F (37.2 °C)      Physical Exam:  LUNG: clear to auscultation bilaterally, HEART: regular rate and rhythm, S1, S2 normal, no murmur, click, rub or gallop, ABDOMEN: soft, non-distended approp tender.  Bowel sounds normal. No masses,  no organomegaly, incisions CDI, EXTREMITIES:  extremities normal, atraumatic, no cyanosis or edema    Labs:   Recent Results (from the past 48 hour(s))   CBC W/O DIFF    Collection Time: 17  2:20 AM   Result Value Ref Range    WBC 9.5 4.1 - 11.1 K/uL    RBC 3.70 (L) 4.10 - 5.70 M/uL    HGB 11.0 (L) 12.1 - 17.0 g/dL    HCT 34.2 (L) 36.6 - 50.3 %    MCV 92.4 80.0 - 99.0 FL    MCH 29.7 26.0 - 34.0 PG    MCHC 32.2 30.0 - 36.5 g/dL    RDW 14.7 (H) 11.5 - 14.5 %    PLATELET 132 633 - 334 K/uL   METABOLIC PANEL, COMPREHENSIVE    Collection Time: 12/29/17  2:20 AM   Result Value Ref Range    Sodium 137 136 - 145 mmol/L    Potassium 4.8 3.5 - 5.1 mmol/L    Chloride 104 97 - 108 mmol/L    CO2 26 21 - 32 mmol/L    Anion gap 7 5 - 15 mmol/L    Glucose 131 (H) 65 - 100 mg/dL    BUN 16 6 - 20 MG/DL    Creatinine 1.09 0.70 - 1.30 MG/DL    BUN/Creatinine ratio 15 12 - 20      GFR est AA >60 >60 ml/min/1.73m2    GFR est non-AA >60 >60 ml/min/1.73m2    Calcium 9.0 8.5 - 10.1 MG/DL    Bilirubin, total 0.7 0.2 - 1.0 MG/DL    ALT (SGPT) 396 (H) 12 - 78 U/L    AST (SGOT) 426 (H) 15 - 37 U/L    Alk.  phosphatase 56 45 - 117 U/L    Protein, total 7.9 6.4 - 8.2 g/dL    Albumin 4.2 3.5 - 5.0 g/dL    Globulin 3.7 2.0 - 4.0 g/dL    A-G Ratio 1.1 1.1 - 2.2         Data Review images and reports reviewed

## 2017-12-29 NOTE — PROGRESS NOTES
General Surgery End of Shift Nursing Note    Bedside shift change report given to Lisa (oncoming nurse) by Delton Curling (offgoing nurse). Report included the following information SBAR and Kardex. Shift worked:   7p-7a   Summary of shift:       Issues for physician to address:        Number times ambulated in hallway past shift: 0    Number of times OOB to chair past shift: 0     Pain Management:  Current medication: Lalita/Dilaudid  Patient states pain is manageable on current pain medication: YES    GI:    Current diet:  DIET REGULAR    Tolerating current diet: YES  Passing flatus: NO      Respiratory:    Incentive Spirometer at bedside: YES  Patient instructed on use: NO    Patient Safety:    Falls Score: 1  Bed Alarm On? No  Sitter?  No    Celso Lara Cea, RN

## 2017-12-29 NOTE — PROGRESS NOTES
Nutrition:  Consult received for TF management. Chart reviewed. Pt not on TF. Suspect consult was autochecked on an order set. Will cancel order. Please reconsult prn. Thank you.     Latisha, [address] [place]   Pager [phone]

## 2017-12-29 NOTE — PROGRESS NOTES
Responded to pt call bell to find him gripping the side rails c/o pain in penis and lower abdomen. Bladder scan showed 570ml. Ortega isnerted and 600ml clear yellow urine drained, pt had immediate relief. General Surgery End of Shift Nursing Note     Bedside shift change report given to (oncoming nurse) by Timo Vaughn (offgoing nurse). Report included the following information SBAR, Kardex, Procedure Summary, Intake/Output and MAR.     Shift worked:   7a-7p   Summary of shift:    Post op  Ambulating, states he is unable to use IS 2/2 increased pain. Ortega inserted for retention. Issues for physician to address:   none      Number times ambulated in hallway past shift: 1    Number of times OOB to chair past shift: 1     Pain Management:  Current medication: see MAR  Patient states pain is manageable on current pain medication: YES     GI:       Tolerating current diet: YES  Passing flatus: NO     Respiratory:     Incentive Spirometer at bedside: YES  Patient instructed on use: YES     Patient Safety:     Falls Score: 3  Bed Alarm On? No  Sitter?  No     Nestor Soto

## 2018-01-01 ENCOUNTER — HOSPITAL ENCOUNTER (OUTPATIENT)
Dept: CT IMAGING | Age: 68
Discharge: HOME | End: 2018-02-14
Attending: SURGERY
Payer: MEDICARE

## 2018-01-01 ENCOUNTER — HOSPITAL ENCOUNTER (OUTPATIENT)
Dept: CT IMAGING | Age: 68
Setting detail: OBSERVATION
End: 2018-02-15
Attending: SURGERY | Admitting: RADIOLOGY
Payer: MEDICARE

## 2018-01-01 ENCOUNTER — TELEPHONE (OUTPATIENT)
Dept: SURGERY | Age: 68
End: 2018-01-01

## 2018-01-01 ENCOUNTER — APPOINTMENT (OUTPATIENT)
Dept: INFUSION THERAPY | Age: 68
End: 2018-01-01
Payer: MEDICARE

## 2018-01-01 ENCOUNTER — HOSPITAL ENCOUNTER (OUTPATIENT)
Dept: INFUSION THERAPY | Age: 68
Discharge: HOME | End: 2018-02-05
Payer: MEDICARE

## 2018-01-01 ENCOUNTER — HOSPITAL ENCOUNTER (OUTPATIENT)
Dept: INFUSION THERAPY | Age: 68
End: 2018-01-01
Payer: MEDICARE

## 2018-01-01 ENCOUNTER — OFFICE VISIT (OUTPATIENT)
Dept: SURGERY | Age: 68
End: 2018-01-01

## 2018-01-01 ENCOUNTER — APPOINTMENT (OUTPATIENT)
Dept: ULTRASOUND IMAGING | Age: 68
End: 2018-01-01
Attending: SURGERY
Payer: MEDICARE

## 2018-01-01 ENCOUNTER — OFFICE VISIT (OUTPATIENT)
Dept: ONCOLOGY | Age: 68
End: 2018-01-01

## 2018-01-01 VITALS
OXYGEN SATURATION: 96 % | HEART RATE: 74 BPM | WEIGHT: 213 LBS | BODY MASS INDEX: 30.49 KG/M2 | DIASTOLIC BLOOD PRESSURE: 70 MMHG | HEIGHT: 70 IN | SYSTOLIC BLOOD PRESSURE: 120 MMHG

## 2018-01-01 VITALS
OXYGEN SATURATION: 94 % | SYSTOLIC BLOOD PRESSURE: 125 MMHG | RESPIRATION RATE: 16 BRPM | DIASTOLIC BLOOD PRESSURE: 77 MMHG | WEIGHT: 213.2 LBS | HEIGHT: 70 IN | TEMPERATURE: 98 F | HEART RATE: 96 BPM | BODY MASS INDEX: 30.52 KG/M2

## 2018-01-01 VITALS
HEIGHT: 70 IN | TEMPERATURE: 98.1 F | BODY MASS INDEX: 29.35 KG/M2 | WEIGHT: 205 LBS | RESPIRATION RATE: 27 BRPM | SYSTOLIC BLOOD PRESSURE: 86 MMHG | HEART RATE: 85 BPM | OXYGEN SATURATION: 99 % | DIASTOLIC BLOOD PRESSURE: 28 MMHG

## 2018-01-01 VITALS
BODY MASS INDEX: 30.28 KG/M2 | RESPIRATION RATE: 16 BRPM | WEIGHT: 211 LBS | HEART RATE: 96 BPM | DIASTOLIC BLOOD PRESSURE: 71 MMHG | OXYGEN SATURATION: 94 % | TEMPERATURE: 99.1 F | SYSTOLIC BLOOD PRESSURE: 130 MMHG

## 2018-01-01 DIAGNOSIS — K75.0 LIVER ABSCESS: Primary | ICD-10-CM

## 2018-01-01 DIAGNOSIS — C18.0 ADENOCARCINOMA OF CECUM STAGE, IVB (HCC): Primary | ICD-10-CM

## 2018-01-01 DIAGNOSIS — C18.0 ADENOCARCINOMA OF CECUM STAGE, IVB (HCC): ICD-10-CM

## 2018-01-01 DIAGNOSIS — C18.9 COLON CARCINOMA METASTATIC TO LIVER (HCC): ICD-10-CM

## 2018-01-01 DIAGNOSIS — C18.9 COLON CANCER METASTASIZED TO LIVER (HCC): Primary | ICD-10-CM

## 2018-01-01 DIAGNOSIS — C78.7 COLON CANCER METASTASIZED TO LIVER (HCC): Primary | ICD-10-CM

## 2018-01-01 DIAGNOSIS — C78.7 COLON CARCINOMA METASTATIC TO LIVER (HCC): Primary | ICD-10-CM

## 2018-01-01 DIAGNOSIS — K75.0 LIVER ABSCESS: ICD-10-CM

## 2018-01-01 DIAGNOSIS — C18.9 COLON CARCINOMA METASTATIC TO LIVER (HCC): Primary | ICD-10-CM

## 2018-01-01 DIAGNOSIS — C78.7 COLON CARCINOMA METASTATIC TO LIVER (HCC): ICD-10-CM

## 2018-01-01 LAB
ABO + RH BLD: NORMAL
ALBUMIN SERPL-MCNC: 3.1 G/DL (ref 3.5–5)
ALBUMIN SERPL-MCNC: 4.1 G/DL (ref 3.6–4.8)
ALBUMIN/GLOB SERPL: 0.6 {RATIO} (ref 1.1–2.2)
ALBUMIN/GLOB SERPL: 1.5 {RATIO} (ref 1.2–2.2)
ALP SERPL-CCNC: 123 U/L (ref 45–117)
ALP SERPL-CCNC: 79 IU/L (ref 39–117)
ALT SERPL-CCNC: 26 IU/L (ref 0–44)
ALT SERPL-CCNC: 38 U/L (ref 12–78)
ANION GAP SERPL CALC-SCNC: 6 MMOL/L (ref 5–15)
ANION GAP SERPL CALC-SCNC: 7 MMOL/L (ref 5–15)
AST SERPL-CCNC: 25 U/L (ref 15–37)
AST SERPL-CCNC: 29 IU/L (ref 0–40)
BASOPHILS # BLD AUTO: 0 X10E3/UL (ref 0–0.2)
BASOPHILS # BLD: 0 K/UL (ref 0–0.1)
BASOPHILS NFR BLD AUTO: 0 %
BASOPHILS NFR BLD: 0 % (ref 0–1)
BILIRUB SERPL-MCNC: 0.2 MG/DL (ref 0–1.2)
BILIRUB SERPL-MCNC: 0.4 MG/DL (ref 0.2–1)
BLD PROD TYP BPU: NORMAL
BLD PROD TYP BPU: NORMAL
BLOOD GROUP ANTIBODIES SERPL: NORMAL
BPU ID: NORMAL
BPU ID: NORMAL
BUN SERPL-MCNC: 12 MG/DL (ref 6–20)
BUN SERPL-MCNC: 14 MG/DL (ref 8–27)
BUN SERPL-MCNC: 22 MG/DL (ref 6–20)
BUN/CREAT SERPL: 13 (ref 12–20)
BUN/CREAT SERPL: 14 (ref 10–24)
BUN/CREAT SERPL: 22 (ref 12–20)
CALCIUM SERPL-MCNC: 8.6 MG/DL (ref 8.5–10.1)
CALCIUM SERPL-MCNC: 9.3 MG/DL (ref 8.5–10.1)
CALCIUM SERPL-MCNC: 9.7 MG/DL (ref 8.6–10.2)
CHLORIDE SERPL-SCNC: 101 MMOL/L (ref 97–108)
CHLORIDE SERPL-SCNC: 101 MMOL/L (ref 97–108)
CHLORIDE SERPL-SCNC: 99 MMOL/L (ref 96–106)
CO2 SERPL-SCNC: 25 MMOL/L (ref 18–29)
CO2 SERPL-SCNC: 26 MMOL/L (ref 21–32)
CO2 SERPL-SCNC: 28 MMOL/L (ref 21–32)
CREAT SERPL-MCNC: 0.89 MG/DL (ref 0.7–1.3)
CREAT SERPL-MCNC: 1 MG/DL (ref 0.7–1.3)
CREAT SERPL-MCNC: 1.02 MG/DL (ref 0.76–1.27)
CROSSMATCH RESULT,%XM: NORMAL
CROSSMATCH RESULT,%XM: NORMAL
DIFFERENTIAL METHOD BLD: ABNORMAL
EOSINOPHIL # BLD AUTO: 0.2 X10E3/UL (ref 0–0.4)
EOSINOPHIL # BLD: 0.1 K/UL (ref 0–0.4)
EOSINOPHIL NFR BLD AUTO: 2 %
EOSINOPHIL NFR BLD: 1 % (ref 0–7)
ERYTHROCYTE [DISTWIDTH] IN BLOOD BY AUTOMATED COUNT: 13.4 % (ref 11.5–14.5)
ERYTHROCYTE [DISTWIDTH] IN BLOOD BY AUTOMATED COUNT: 13.9 % (ref 11.5–14.5)
ERYTHROCYTE [DISTWIDTH] IN BLOOD BY AUTOMATED COUNT: 14.4 % (ref 12.3–15.4)
GLOBULIN SER CALC-MCNC: 2.7 G/DL (ref 1.5–4.5)
GLOBULIN SER CALC-MCNC: 5.2 G/DL (ref 2–4)
GLUCOSE BLD STRIP.AUTO-MCNC: 71 MG/DL (ref 65–100)
GLUCOSE SERPL-MCNC: 117 MG/DL (ref 65–99)
GLUCOSE SERPL-MCNC: 172 MG/DL (ref 65–100)
GLUCOSE SERPL-MCNC: 82 MG/DL (ref 65–100)
HCT VFR BLD AUTO: 32.8 % (ref 36.6–50.3)
HCT VFR BLD AUTO: 34.9 % (ref 36.6–50.3)
HCT VFR BLD AUTO: 35.4 % (ref 37.5–51)
HGB BLD-MCNC: 11 G/DL (ref 12.1–17)
HGB BLD-MCNC: 11.5 G/DL (ref 12.1–17)
HGB BLD-MCNC: 11.8 G/DL (ref 13–17.7)
IMM GRANULOCYTES # BLD: 0 X10E3/UL (ref 0–0.1)
IMM GRANULOCYTES # BLD: 0.1 K/UL (ref 0–0.04)
IMM GRANULOCYTES NFR BLD AUTO: 1 % (ref 0–0.5)
IMM GRANULOCYTES NFR BLD: 0 %
LYMPHOCYTES # BLD AUTO: 2.7 X10E3/UL (ref 0.7–3.1)
LYMPHOCYTES # BLD: 2 K/UL (ref 0.8–3.5)
LYMPHOCYTES NFR BLD AUTO: 31 %
LYMPHOCYTES NFR BLD: 16 % (ref 12–49)
MCH RBC QN AUTO: 29.8 PG (ref 26–34)
MCH RBC QN AUTO: 30.3 PG (ref 26–34)
MCH RBC QN AUTO: 30.7 PG (ref 26.6–33)
MCHC RBC AUTO-ENTMCNC: 33 G/DL (ref 30–36.5)
MCHC RBC AUTO-ENTMCNC: 33.3 G/DL (ref 31.5–35.7)
MCHC RBC AUTO-ENTMCNC: 33.5 G/DL (ref 30–36.5)
MCV RBC AUTO: 88.9 FL (ref 80–99)
MCV RBC AUTO: 91.8 FL (ref 80–99)
MCV RBC AUTO: 92 FL (ref 79–97)
MONOCYTES # BLD AUTO: 0.6 X10E3/UL (ref 0.1–0.9)
MONOCYTES # BLD: 0.9 K/UL (ref 0–1)
MONOCYTES NFR BLD AUTO: 7 %
MONOCYTES NFR BLD: 7 % (ref 5–13)
NEUTROPHILS # BLD AUTO: 5.3 X10E3/UL (ref 1.4–7)
NEUTROPHILS NFR BLD AUTO: 60 %
NEUTS SEG # BLD: 9.6 K/UL (ref 1.8–8)
NEUTS SEG NFR BLD: 76 % (ref 32–75)
NRBC # BLD: 0 K/UL (ref 0–0.01)
NRBC # BLD: 0 K/UL (ref 0–0.01)
NRBC BLD-RTO: 0 PER 100 WBC
NRBC BLD-RTO: 0 PER 100 WBC
PLATELET # BLD AUTO: 280 K/UL (ref 150–400)
PLATELET # BLD AUTO: 339 K/UL (ref 150–400)
PLATELET # BLD AUTO: 375 X10E3/UL (ref 150–379)
PMV BLD AUTO: 10 FL (ref 8.9–12.9)
PMV BLD AUTO: 9.4 FL (ref 8.9–12.9)
POTASSIUM SERPL-SCNC: 4 MMOL/L (ref 3.5–5.1)
POTASSIUM SERPL-SCNC: 4.2 MMOL/L (ref 3.5–5.1)
POTASSIUM SERPL-SCNC: 5 MMOL/L (ref 3.5–5.2)
PROT SERPL-MCNC: 6.8 G/DL (ref 6–8.5)
PROT SERPL-MCNC: 8.3 G/DL (ref 6.4–8.2)
RBC # BLD AUTO: 3.69 M/UL (ref 4.1–5.7)
RBC # BLD AUTO: 3.8 M/UL (ref 4.1–5.7)
RBC # BLD AUTO: 3.84 X10E6/UL (ref 4.14–5.8)
SERVICE CMNT-IMP: NORMAL
SODIUM SERPL-SCNC: 134 MMOL/L (ref 136–145)
SODIUM SERPL-SCNC: 135 MMOL/L (ref 136–145)
SODIUM SERPL-SCNC: 139 MMOL/L (ref 134–144)
SPECIMEN EXP DATE BLD: NORMAL
STATUS OF UNIT,%ST: NORMAL
STATUS OF UNIT,%ST: NORMAL
UNIT DIVISION, %UDIV: 0
UNIT DIVISION, %UDIV: 0
WBC # BLD AUTO: 12.7 K/UL (ref 4.1–11.1)
WBC # BLD AUTO: 6.3 K/UL (ref 4.1–11.1)
WBC # BLD AUTO: 8.7 X10E3/UL (ref 3.4–10.8)

## 2018-01-01 PROCEDURE — 74011250636 HC RX REV CODE- 250/636: Performed by: RADIOLOGY

## 2018-01-01 PROCEDURE — 77030003630 HC NDL PERC VASC COOK -B

## 2018-01-01 PROCEDURE — 82962 GLUCOSE BLOOD TEST: CPT

## 2018-01-01 PROCEDURE — 74011000255 HC RX REV CODE- 255: Performed by: SURGERY

## 2018-01-01 PROCEDURE — 77010033678 HC OXYGEN DAILY

## 2018-01-01 PROCEDURE — 74011250636 HC RX REV CODE- 250/636: Performed by: SURGERY

## 2018-01-01 PROCEDURE — 77030003560 HC NDL HUBR BARD -A

## 2018-01-01 PROCEDURE — 99218 HC RM OBSERVATION: CPT

## 2018-01-01 PROCEDURE — 76942 ECHO GUIDE FOR BIOPSY: CPT

## 2018-01-01 PROCEDURE — 36415 COLL VENOUS BLD VENIPUNCTURE: CPT | Performed by: INTERNAL MEDICINE

## 2018-01-01 PROCEDURE — 80053 COMPREHEN METABOLIC PANEL: CPT | Performed by: SURGERY

## 2018-01-01 PROCEDURE — 96361 HYDRATE IV INFUSION ADD-ON: CPT

## 2018-01-01 PROCEDURE — 31500 INSERT EMERGENCY AIRWAY: CPT

## 2018-01-01 PROCEDURE — 80048 BASIC METABOLIC PNL TOTAL CA: CPT | Performed by: INTERNAL MEDICINE

## 2018-01-01 PROCEDURE — 92950 HEART/LUNG RESUSCITATION CPR: CPT

## 2018-01-01 PROCEDURE — C1729 CATH, DRAINAGE: HCPCS

## 2018-01-01 PROCEDURE — 36415 COLL VENOUS BLD VENIPUNCTURE: CPT | Performed by: SURGERY

## 2018-01-01 PROCEDURE — 96376 TX/PRO/DX INJ SAME DRUG ADON: CPT

## 2018-01-01 PROCEDURE — 96375 TX/PRO/DX INJ NEW DRUG ADDON: CPT

## 2018-01-01 PROCEDURE — 74178 CT ABD&PLV WO CNTR FLWD CNTR: CPT

## 2018-01-01 PROCEDURE — 74011636320 HC RX REV CODE- 636/320: Performed by: SURGERY

## 2018-01-01 PROCEDURE — 96374 THER/PROPH/DIAG INJ IV PUSH: CPT

## 2018-01-01 PROCEDURE — 99152 MOD SED SAME PHYS/QHP 5/>YRS: CPT

## 2018-01-01 PROCEDURE — 85025 COMPLETE CBC W/AUTO DIFF WBC: CPT | Performed by: INTERNAL MEDICINE

## 2018-01-01 PROCEDURE — 85027 COMPLETE CBC AUTOMATED: CPT | Performed by: SURGERY

## 2018-01-01 PROCEDURE — 99153 MOD SED SAME PHYS/QHP EA: CPT

## 2018-01-01 PROCEDURE — 74011000250 HC RX REV CODE- 250: Performed by: RADIOLOGY

## 2018-01-01 RX ORDER — SODIUM CHLORIDE 0.9 % (FLUSH) 0.9 %
10 SYRINGE (ML) INJECTION
Status: COMPLETED | OUTPATIENT
Start: 2018-01-01 | End: 2018-01-01

## 2018-01-01 RX ORDER — FENTANYL CITRATE 50 UG/ML
100 INJECTION, SOLUTION INTRAMUSCULAR; INTRAVENOUS
Status: DISCONTINUED | OUTPATIENT
Start: 2018-01-01 | End: 2018-01-01

## 2018-01-01 RX ORDER — FLUMAZENIL 0.1 MG/ML
INJECTION INTRAVENOUS
Status: DISPENSED
Start: 2018-01-01 | End: 2018-02-16

## 2018-01-01 RX ORDER — EPINEPHRINE 0.1 MG/ML
INJECTION INTRACARDIAC; INTRAVENOUS
Status: COMPLETED | OUTPATIENT
Start: 2018-01-01 | End: 2018-01-01

## 2018-01-01 RX ORDER — BARIUM SULFATE 20 MG/ML
900 SUSPENSION ORAL
Status: COMPLETED | OUTPATIENT
Start: 2018-01-01 | End: 2018-01-01

## 2018-01-01 RX ORDER — SODIUM CHLORIDE 9 MG/ML
25 INJECTION, SOLUTION INTRAVENOUS CONTINUOUS
Status: DISCONTINUED | OUTPATIENT
Start: 2018-01-01 | End: 2018-01-01

## 2018-01-01 RX ORDER — SODIUM BICARBONATE 1 MEQ/ML
SYRINGE (ML) INTRAVENOUS
Status: COMPLETED | OUTPATIENT
Start: 2018-01-01 | End: 2018-01-01

## 2018-01-01 RX ORDER — MIDAZOLAM HYDROCHLORIDE 1 MG/ML
5 INJECTION, SOLUTION INTRAMUSCULAR; INTRAVENOUS
Status: DISCONTINUED | OUTPATIENT
Start: 2018-01-01 | End: 2018-01-01

## 2018-01-01 RX ORDER — NALOXONE HYDROCHLORIDE 0.4 MG/ML
INJECTION, SOLUTION INTRAMUSCULAR; INTRAVENOUS; SUBCUTANEOUS
Status: DISPENSED
Start: 2018-01-01 | End: 2018-02-16

## 2018-01-01 RX ORDER — SODIUM CHLORIDE 9 MG/ML
50 INJECTION, SOLUTION INTRAVENOUS
Status: COMPLETED | OUTPATIENT
Start: 2018-01-01 | End: 2018-01-01

## 2018-01-01 RX ORDER — FLUMAZENIL 0.1 MG/ML
INJECTION INTRAVENOUS
Status: COMPLETED | OUTPATIENT
Start: 2018-01-01 | End: 2018-01-01

## 2018-01-01 RX ORDER — NALOXONE HYDROCHLORIDE 0.4 MG/ML
INJECTION, SOLUTION INTRAMUSCULAR; INTRAVENOUS; SUBCUTANEOUS
Status: COMPLETED | OUTPATIENT
Start: 2018-01-01 | End: 2018-01-01

## 2018-01-01 RX ORDER — MAGNESIUM SULFATE HEPTAHYDRATE 40 MG/ML
INJECTION, SOLUTION INTRAVENOUS
Status: COMPLETED | OUTPATIENT
Start: 2018-01-01 | End: 2018-01-01

## 2018-01-01 RX ORDER — AMIODARONE HYDROCHLORIDE 150 MG/3ML
INJECTION, SOLUTION INTRAVENOUS
Status: COMPLETED | OUTPATIENT
Start: 2018-01-01 | End: 2018-01-01

## 2018-01-01 RX ADMIN — EPINEPHRINE 1 MG: 0.1 INJECTION, SOLUTION ENDOTRACHEAL; INTRACARDIAC; INTRAVENOUS at 14:11

## 2018-01-01 RX ADMIN — SODIUM CHLORIDE 25 ML/HR: 900 INJECTION, SOLUTION INTRAVENOUS at 13:02

## 2018-01-01 RX ADMIN — MIDAZOLAM HYDROCHLORIDE 1 MG: 1 INJECTION, SOLUTION INTRAMUSCULAR; INTRAVENOUS at 13:16

## 2018-01-01 RX ADMIN — MIDAZOLAM HYDROCHLORIDE 1 MG: 1 INJECTION, SOLUTION INTRAMUSCULAR; INTRAVENOUS at 13:31

## 2018-01-01 RX ADMIN — Medication 150 MG: at 14:16

## 2018-01-01 RX ADMIN — FENTANYL CITRATE 50 MCG: 50 INJECTION, SOLUTION INTRAMUSCULAR; INTRAVENOUS at 13:08

## 2018-01-01 RX ADMIN — Medication 10 ML: at 12:53

## 2018-01-01 RX ADMIN — BARIUM SULFATE 900 ML: 21 SUSPENSION ORAL at 12:52

## 2018-01-01 RX ADMIN — FLUMAZENIL 0.2 MG: 0.1 INJECTION INTRAVENOUS at 13:54

## 2018-01-01 RX ADMIN — NALOXONE HYDROCHLORIDE 0.4 MG: 0.4 INJECTION, SOLUTION INTRAMUSCULAR; INTRAVENOUS; SUBCUTANEOUS at 13:55

## 2018-01-01 RX ADMIN — IOPAMIDOL 100 ML: 755 INJECTION, SOLUTION INTRAVENOUS at 12:52

## 2018-01-01 RX ADMIN — SODIUM BICARBONATE 50 MEQ: 84 INJECTION, SOLUTION INTRAVENOUS at 14:09

## 2018-01-01 RX ADMIN — SODIUM CHLORIDE 50 ML/HR: 900 INJECTION, SOLUTION INTRAVENOUS at 12:53

## 2018-01-01 RX ADMIN — FENTANYL CITRATE 25 MCG: 50 INJECTION, SOLUTION INTRAMUSCULAR; INTRAVENOUS at 13:37

## 2018-01-01 RX ADMIN — Medication 300 MG: at 14:12

## 2018-01-01 RX ADMIN — EPINEPHRINE 1 MG: 0.1 INJECTION, SOLUTION ENDOTRACHEAL; INTRACARDIAC; INTRAVENOUS at 14:05

## 2018-01-01 RX ADMIN — EPINEPHRINE 1 MG: 0.1 INJECTION, SOLUTION ENDOTRACHEAL; INTRACARDIAC; INTRAVENOUS at 14:02

## 2018-01-01 RX ADMIN — MIDAZOLAM HYDROCHLORIDE 1 MG: 1 INJECTION, SOLUTION INTRAMUSCULAR; INTRAVENOUS at 13:42

## 2018-01-01 RX ADMIN — MIDAZOLAM HYDROCHLORIDE 1 MG: 1 INJECTION, SOLUTION INTRAMUSCULAR; INTRAVENOUS at 13:35

## 2018-01-01 RX ADMIN — EPINEPHRINE 1 MG: 0.1 INJECTION, SOLUTION ENDOTRACHEAL; INTRACARDIAC; INTRAVENOUS at 14:21

## 2018-01-01 RX ADMIN — MIDAZOLAM HYDROCHLORIDE 2 MG: 1 INJECTION, SOLUTION INTRAMUSCULAR; INTRAVENOUS at 13:08

## 2018-01-01 RX ADMIN — MAGNESIUM SULFATE IN WATER 2 G: 40 INJECTION, SOLUTION INTRAVENOUS at 14:20

## 2018-01-01 RX ADMIN — EPINEPHRINE 1 MG: 0.1 INJECTION, SOLUTION ENDOTRACHEAL; INTRACARDIAC; INTRAVENOUS at 14:14

## 2018-01-01 RX ADMIN — EPINEPHRINE 1 MG: 0.1 INJECTION, SOLUTION ENDOTRACHEAL; INTRACARDIAC; INTRAVENOUS at 14:08

## 2018-01-01 RX ADMIN — EPINEPHRINE 1 MG: 0.1 INJECTION, SOLUTION ENDOTRACHEAL; INTRACARDIAC; INTRAVENOUS at 13:58

## 2018-01-01 RX ADMIN — FENTANYL CITRATE 25 MCG: 50 INJECTION, SOLUTION INTRAMUSCULAR; INTRAVENOUS at 13:21

## 2018-01-01 RX ADMIN — EPINEPHRINE 1 MG: 0.1 INJECTION, SOLUTION ENDOTRACHEAL; INTRACARDIAC; INTRAVENOUS at 14:17

## 2018-01-01 RX ADMIN — SODIUM BICARBONATE 50 MEQ: 84 INJECTION, SOLUTION INTRAVENOUS at 14:17

## 2018-01-02 NOTE — DISCHARGE SUMMARY
.  Physician Discharge Summary     Patient ID:  Dillon Del Toro  [id number]  89 y.o.  1950    Admit Date: 12/28/2017    Discharge Date: 12/29/2017    * Admission Diagnoses: METASTATIC COLON CANCER TO THE LIVER ; Colon cancer metastas*    * Discharge Diagnoses:    Hospital Problems as of 12/29/2017  Date Reviewed: 11/28/2017          Codes Class Noted - Resolved POA    Colon cancer metastasized to liver [facility]) ICD-10-CM: C18.9, C78.7  ICD-9-CM: 153.9, 197.7  12/28/2017 - Present Unknown               Admission Condition: Good    * Discharge Condition: good    * Procedures: Procedure(s):  LAPAROSCOPIC PARTIAL HEPATIC LOBECTOMY WITH INTRAOPERTIVE ULTRASOUND GUIDANCE     * Hospital Course:   Normal hospital course for this procedure. Consults: None    Significant Diagnostic Studies: path pending    * Disposition: Home    Discharge Medications:   Discharge Medication List as of 12/29/2017  3:54 PM      START taking these medications    Details   oxyCODONE-acetaminophen (PERCOCET) 5-325 mg per tablet Take 1-2 Tabs by mouth every six (6) hours as needed for Pain for up to 20 days. Max Daily Amount: 8 Tabs., Print, Disp-30 Tab, R-0      senna-docusate (SENNA-S) 8.6-50 mg per tablet Take 1 Tab by mouth daily for 30 days. , Normal, Disp-30 Tab, R-0         CONTINUE these medications which have NOT CHANGED    Details   loratadine (CLARITIN) 10 mg tablet Take 10 mg by mouth daily. , Historical Med      sodium chloride (OCEAN) 0.65 % nasal spray 2 Sprays by Both Nostrils route as needed for Congestion. , Historical Med      sildenafil (REVATIO) 20 mg tablet TAKE 1 TABLETS EVERY DAY, Historical Med, R-12      PROCTOSOL HC 2.5 % rectal cream USE 1 APPLICATION TOPICALLY  DAILY, Historical Med, R-2, JASON      atorvastatin (LIPITOR) 40 mg tablet Take 40 mg by mouth daily. , Historical Med      MULTIVITAMIN PO Take 1 Tab by mouth daily. For men 48 +.  Takes one po every day., Historical Med      metoprolol succinate (TOPROL-XL) 25 mg XL tablet Take 25 mg by mouth daily. , Historical Med      lidocaine-prilocaine (EMLA) topical cream Apply  to affected area as needed for Pain., Normal, Disp-30 g, R-0      aspirin 81 mg chewable tablet Take 81 mg by mouth daily. , Historical Med      lisinopril (PRINIVIL, ZESTRIL) 5 mg tablet Take 5 mg by mouth daily. , Historical Med      acetaminophen (TYLENOL EXTRA STRENGTH) 500 mg tablet Take 500 mg by mouth as needed for Pain., Historical Med             * Follow-up Care/Patient Instructions:   Activity: Activity as tolerated  Diet: Regular Diet  Wound Care: As directed    Follow-up Information     Follow up With Details Comments Contact Info    Henrietta Worrell MD On 1/15/2018 APPOINTMENT TIME: 10:00AM [address] [place] [address] [address] [place] [address]. [id number]          Follow-up tests/labs tbd    Signed:  Henrietta Worrell MD  1/2/2018  9:52 AM

## 2018-01-15 NOTE — MR AVS SNAPSHOT
Visit Information Date & Time Provider Department Dept. Phone Encounter #  
 1/15/2018 10:00 AM Lucia Steward MD [facility] [address] [id number] Your Appointments 1/19/2018 11:45 AM  
ESTABLISHED PATIENT with Scotty Rowland MD  
[address] Oncology at [facility]) Appt Note: onc f/u  
 [address] [facility] [address] [id number]  
[address] [place] [address] [address] Sacha Castro Upcoming Health Maintenance Date Due Hepatitis C Screening 1950 DTaP/Tdap/Td series (1 - Tdap) 10/30/1970 FOBT Q 1 YEAR AGE 50-75 10/30/1999 ZOSTER VACCINE AGE 60> 8/30/2009 GLAUCOMA SCREENING Q2Y 10/30/2014 MEDICARE YEARLY EXAM 10/30/2014 Influenza Age 5 to Adult 8/1/2017 Pneumococcal 65+ High/Highest Risk (2 of 2 - PPSV23) 12/11/2017 Allergies as of 1/15/2018  Review Complete On: 1/15/2018 By: Lucia Steward MD  
  
 Severity Noted Reaction Type Reactions Plavix [Clopidogrel]  10/19/2012    Rash Hives and itching Current Immunizations  Reviewed on 11/7/2017 Name Date Influenza Vaccine 10/1/2015 Influenza Vaccine Split 9/1/2012 ZZZ-RETIRED (DO NOT USE) Pneumococcal Vaccine (Unspecified Type) 12/11/2012  2:02 PM  
  
 Not reviewed this visit You Were Diagnosed With   
  
 Codes Comments Colon cancer metastasized to liver [facility])    -  Primary ICD-10-CM: C18.9, C78.7 ICD-9-CM: 153.9, 197.7 Adenocarcinoma of cecum stage, IVb ([facility] [address]     ICD-10-CM: C18.0 ICD-9-CM: 153.4 Vitals BP Pulse Height(growth percentile) Weight(growth percentile) SpO2 BMI  
 120/70 (BP 1 Location: Right arm, BP Patient Position: Sitting) 74 5' 10\" (1.778 m) 213 lb (96.6 kg) 96% 30.56 kg/m2 Smoking Status Former Smoker BMI and BSA Data Body Mass Index Body Surface Area 30.56 kg/m 2 2.18 m 2 Preferred Pharmacy Pharmacy Name Phone [facility][id number] [facility] [phone] Your Updated Medication List  
  
   
This list is accurate as of: 1/15/18 12:06 PM.  Always use your most recent med list.  
  
  
  
  
 aspirin 81 mg chewable tablet Take 81 mg by mouth daily. atorvastatin 40 mg tablet Commonly known as:  LIPITOR Take 40 mg by mouth daily. CLARITIN 10 mg tablet Generic drug:  loratadine Take 10 mg by mouth daily. lidocaine-prilocaine topical cream  
Commonly known as:  EMLA Apply  to affected area as needed for Pain. lisinopril 5 mg tablet Commonly known as:  Cleotis Peoples Take 5 mg by mouth daily. metoprolol succinate 25 mg XL tablet Commonly known as:  TOPROL-XL Take 25 mg by mouth daily. MULTIVITAMIN PO Take 1 Tab by mouth daily. For men 48 +. Takes one po every day. oxyCODONE-acetaminophen 5-325 mg per tablet Commonly known as:  PERCOCET Take 1-2 Tabs by mouth every six (6) hours as needed for Pain for up to 20 days. Max Daily Amount: 8 Tabs. PROCTOSOL HC 2.5 % rectal cream  
Generic drug:  hydrocortisone USE 1 APPLICATION TOPICALLY  DAILY  
  
 senna-docusate 8.6-50 mg per tablet Commonly known as:  SENNA-S Take 1 Tab by mouth daily for 30 days. sildenafil (antihypertensive) 20 mg tablet Commonly known as:  REVATIO  
TAKE 1 TABLETS EVERY DAY  
  
 sodium chloride 0.65 % nasal spray Commonly known as:  OCEAN  
2 Sprays by Both Nostrils route as needed for Congestion. TYLENOL EXTRA STRENGTH 500 mg tablet Generic drug:  acetaminophen Take 500 mg by mouth as needed for Pain. We Performed the Following CBC WITH AUTOMATED DIFF [01790 CPT(R)] METABOLIC PANEL, COMPREHENSIVE [86338 CPT(R)] To-Do List   
 04/15/2018 Imaging:  CT CHEST WO CONT   
  
 04/16/2018 Imaging:  CT ABD PELV W WO CONT   
  
 04/18/2018 8:00 AM  
  Appointment with [facility] CT 2 at [facility] RAD CT ([phone]) NON-CONTRAST STUDY: 1. Bring any non [facility] facility films/images pertaining to the area of interest with you on the day of appointment. 2. Check in at registration at least 30 minutes before appt time unless you were instructed to do otherwise. 3. If you have to drink oral contrast please pick it up any weekday prior to your appointment, if you cannot please check in 2 hrs  before appt time. 04/18/2018 8:30 AM  
  Appointment with [facility] CT 1 at [facility] RAD CT ([phone]) CONTRAST STUDY: 1. The patient should not eat solid food four hours before the appointment but should be encouraged to drink clear liquids. 2.  If you have to drink oral contrast, please pick it up any weekday prior to your appointment, if you cannot please check in 2 hrs before appt time. 3.  The patient will require IV access for contrast administration. 4.  The patient should not take Ibuprofen (Advil, Motrin, etc.) and Naproxen Sodium (Aleve, etc.)  on the day of the exam. Stopping non-steroidal anti-inflammatory agents (NSAIDs) like Ibuprofen decreases the risk of kidney damage from the x-ray contrast (dye). 5.  Bring any non [facility] facility films/images pertaining to the area of interest with you on the day of appointment. 6.  Bring current lab work if available (within last 90 days CMP) ***If scheduled at [facility], iSTAT is not available, labs will need to be done before appointment*** 7. Check in at registration at least 30 minutes before appt time unless you were instructed to do otherwise. Introducing [facility]! Dear Alexi Means: Thank you for requesting a [employer] account. Our records indicate that you already have an active [employer] account. You can access your account anytime at [email or web address] Did you know that you can access your hospital and ER discharge instructions at any time in [employer]? You can also review all of your test results from your hospital stay or ER visit. Additional Information If you have questions, please visit the Frequently Asked Questions section of the [employer] website at [email or web address] Remember, [employer] is NOT to be used for urgent needs. For medical emergencies, dial 911. Now available from your iPhone and Android! Please provide this summary of care documentation to your next provider. Your primary care clinician is listed as Keenan David. If you have any questions after today's visit, please call [phone].

## 2018-01-15 NOTE — PROGRESS NOTES
1. Have you been to the ER, urgent care clinic since your last visit? Hospitalized since your last visit? No    2. Have you seen or consulted any other health care providers outside of the [address] [place] [address] since your last visit? Include any pap smears or colon screening.  No

## 2018-01-15 NOTE — PROGRESS NOTES
Surgery  Follow up  Procedure: lap partial hepatic lobectomy times two  OR date:  12/28/2018  Path:       FINAL PATHOLOGIC DIAGNOSIS   1. Liver segment 8 inferior, partial hepatectomy:   Subcapsular necrotic focus with foreign body giant cell reaction (possible previous biopsy site), negative for carcinoma   Mild macrovesicular steatosis   2. Liver segment 8 superior, partial hepatectomy:   Metastatic poorly differentiated adenocarcinoma, 0.6 cm focus, consistent with colorectal primary   Margins: See comment   Comment   Because of specimen fragmentation, assessment of margins is problematic. After review of the specimen with Dr. Radha Lazo, it seems likely that the final margin of specimen #2 is widely free, however, a definitive statement requires correlation with findings at surgery. Because of the history of multiple primary carcinomas, a small panel of immunohistochemical stains is performed with appropriate controls and shows positive staining for cytokeratin 20 and CDX -2 with negative staining for cytokeratin 7 and cytokeratin 5/6. The morphology and staining pattern support the above diagnosis. S I feel exhausted.    No fever, chills or sweats    Visit Vitals    /70 (BP 1 Location: Right arm, BP Patient Position: Sitting)    Pulse 74    Ht 5' 10\" (1.778 m)    Wt 96.6 kg (213 lb)    SpO2 96%    BMI 30.56 kg/m2       O Incisions healing well without infection   No signs of hernia    A/P Doing ok   Needs f/u with Dr Geovanna Williamson to restart chemotherapy soon (ok from my standpoint if labs are ok)   Check CBC, CMP, CEA   Repeat CT A/P in April   RTC 4 weeks    Addi Webb MD FACS

## 2018-01-19 NOTE — PROGRESS NOTES
Progress Note      Patient: Tracie Rogers MRN: [id number]  SSN: [id number]    YOB: 1950  Age: 75 y.o. Sex: male        Diagnosis:     1. 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 Right hemicolectomy 07/2017  2. Systemic chemotherapy   mFOLFOX for 6 cycles  3. S/P hepatectomy 12/28/2018    Subjective:      Tracie Rogers is a 75 y.o. male with a diagnosis of metastatic cecal carcinoma. He presented to the ER with worsening abdominal pain for 5 days. He was recognized to have bowel obstruction and underwent laparatomy with right hemicolectomy. The pathology shows a diagnosis of poorly differentiated carcinoma of the cecum. Mr. Pura Thornton completed 6 cycles of mFOLFOX. He then underwent partial hepatectomy and resection of the two foci in the liver. He is recovering well from the surgery. He experiences some RUQ pain. 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 Petersen. He had a radical prostatectomy few months ago by Dr. Constantin Herrera. He suffers with CAD and had a CABG in the remote past.         Review of Systems:    Constitutional: fatigue  Eyes: negative  Ears, Nose, Mouth, Throat, and Face: negative  Respiratory: negative  Cardiovascular: negative  Gastrointestinal: RUQ pain  Genitourinary:negative  Integument/Breast: negative  Hematologic/Lymphatic: negative  Musculoskeletal: negative  Neurological: negative       Prior to Admission medications    Medication Sig Start Date End Date Taking? Authorizing Provider   senna-docusate (SENNA-S) 8.6-50 mg per tablet Take 1 Tab by mouth daily for 30 days. 12/28/17 1/27/18 Yes Oneta Burkitt, MD   loratadine (CLARITIN) 10 mg tablet Take 10 mg by mouth daily. Yes Historical Provider   sodium chloride (OCEAN) 0.65 % nasal spray 2 Sprays by Both Nostrils route as needed for Congestion.    Yes Historical Provider   sildenafil (REVATIO) 20 mg tablet TAKE 1 TABLETS EVERY DAY 8/28/17  Yes Historical Provider   PROCTOSOL HC 2.5 % rectal cream USE 1 APPLICATION TOPICALLY  DAILY 10/18/17  Yes Historical Provider   lidocaine-prilocaine (EMLA) topical cream Apply  to affected area as needed for Pain. 8/16/17  Yes Link Bustamante MD   aspirin 81 mg chewable tablet Take 81 mg by mouth daily. Yes Historical Provider   atorvastatin (LIPITOR) 40 mg tablet Take 40 mg by mouth daily. Yes Historical Provider   lisinopril (PRINIVIL, ZESTRIL) 5 mg tablet Take 5 mg by mouth daily. Yes Historical Provider   MULTIVITAMIN PO Take 1 Tab by mouth daily. For men 48 +. Takes one po every day. Yes Historical Provider   metoprolol succinate (TOPROL-XL) 25 mg XL tablet Take 25 mg by mouth daily. Yes Historical Provider   acetaminophen (TYLENOL EXTRA STRENGTH) 500 mg tablet Take 500 mg by mouth as needed for Pain. Yes Historical Provider        Allergies   Allergen Reactions    Plavix [Clopidogrel] Rash     Hives and itching          Objective:     Vitals:    01/19/18 1145   Resp: 16   Weight: 213 lb 3.2 oz (96.7 kg)   Height: 5' 10\" (1.778 m)            Physical Exam:    GENERAL: alert, cooperative, no distress, appears stated age  EYE: negative  LYMPHATIC: Cervical, supraclavicular, and axillary nodes normal.   THROAT & NECK: normal and no erythema or exudates noted. LUNG: clear to auscultation bilaterally  HEART: regular rate and rhythm  ABDOMEN: soft, non-tender  EXTREMITIES:  no edema  SKIN: Normal.  NEUROLOGIC: negative        CT Results (most recent):    Results from Hospital Encounter encounter on 11/16/17   CT ABD PELV W CONT   Narrative INDICATION: metastatic colon cancer    COMPARISON: August 3, 2017, July 17, 2017    TECHNIQUE:  Following the uneventful intravenous administration of 100 cc  Isovue-300, 5 mm axial images were obtained through the chest. Coronal and  sagittal reconstructions were generated.   CT dose reduction was achieved through  use of a standardized protocol tailored for this examination and automatic  exposure control for dose modulation. Axial CT scan of the abdomen and pelvis  obtained after intravenous and oral contrast    FINDINGS:    Patient had a prior sternotomy. There is no definite axilla mediastinum or hilar  adenopathy. There is no pericardial pleural effusion, major vessels do appear  patent. Coronary artery calcification normal size heart. No shift or  pneumothorax. A small left upper lobe nodule is stable. Minimal pleural  calcification possibly related to prior granulomatous disease. Infusion catheter  in place. The  small liver lesion in  the dome of the liver is decreased in size from 13  to 7 mm. The other lesion slightly lower in the right lobe is unchanged. No new  liver lesion is seen. Liver and spleen are normal in size. The gallbladder is  not distended. The  pancreas does appear unremarkable. Postsurgical changes in  the right colon appear stable. There is no definite adenopathy in the abdomen or  pelvis. Vascular calcification without aneurysm. No bowel obstruction. No free  air or free fluid. The fluid collection in the left lower pelvis is stable. There is mild sigmoid diverticulosis . The bladder is midline but unfilled. Left  hip prostheses. Review of bone windows does not suggest metastases. Impression impression: Stable chest CT, slight improvement in one of the liver lesion. No  other changes.                 Lab Results   Component Value Date/Time    WBC 8.7 01/16/2018 09:28 AM    HGB 11.8 01/16/2018 09:28 AM    HCT 35.4 01/16/2018 09:28 AM    PLATELET 863 17/05/6578 09:28 AM    MCV 92 01/16/2018 09:28 AM       Lab Results   Component Value Date/Time    Sodium 139 01/16/2018 09:28 AM    Potassium 5.0 01/16/2018 09:28 AM    Chloride 99 01/16/2018 09:28 AM    CO2 25 01/16/2018 09:28 AM    Anion gap 7 12/29/2017 02:20 AM    Glucose 117 01/16/2018 09:28 AM    BUN 14 01/16/2018 09:28 AM Creatinine 1.02 01/16/2018 09:28 AM    BUN/Creatinine ratio 14 01/16/2018 09:28 AM    GFR est AA 87 01/16/2018 09:28 AM    GFR est non-AA 75 01/16/2018 09:28 AM    Calcium 9.7 01/16/2018 09:28 AM    Bilirubin, total 0.2 01/16/2018 09:28 AM    AST (SGOT) 29 01/16/2018 09:28 AM    Alk. phosphatase 79 01/16/2018 09:28 AM    Protein, total 6.8 01/16/2018 09:28 AM    Albumin 4.1 01/16/2018 09:28 AM    Globulin 3.7 12/29/2017 02:20 AM    A-G Ratio 1.5 01/16/2018 09:28 AM    ALT (SGPT) 26 01/16/2018 09:28 AM       Assessment:     1. Metastatic poorly differentiated adenocarcinoma of the cecum:                         T4a N2b M1a    > Signet ring histology  > Serosal penetration  > single hypodense lesion in the liver  > 9/14 LN +ve  > extracapsular spread  > Lymphovascular invasion present     ANJEL wild type  MSI stable    Received systemic chemotherapy   mFOLFOX - s/p 6 cycles (oxaliplatin dose reduced 25% d/t neutropenia)    Underwent resection of two lesion in the liver - 12/28/2018  Good response to therapy. One nodule had necrosis and no viable tumor. Another resected nodule had only a 6 mm foci of invasive carcinoma. He is recovering fairly well. Has some RUQ pain. Plan to restart mFOLFOX - in the next few weeks. I shall administer 3 months of mFOLFOX chemotherapy. Symptom management form reviewed with patient. Plan:        > Restart FOLFOX with neulasta  > follow up at start of treatment      Signed by: Katrin Cunningham MD                     January 19, 2018          CC. Silvano Zimmerman MD  CC.  Dashawn Leonard MD

## 2018-01-19 NOTE — PROGRESS NOTES
Jean Claude Verduzco is a 75 y.o. male here today for met colon cancer f/u. Lap. Partial Hepatic Lobectomy; 12/28/17 by Dr. Stephane Mooney. VS stable. Patient states he has occasional pain 7/10 to right upper abdominal area when he cough, sneeze or breath hard. Patient denies N/V/D and constipation. Blood pressure 125/77, pulse 96, temperature 98 °F (36.7 °C), temperature source Oral, resp. rate 16, height 5' 10\" (1.778 m), weight 213 lb 3.2 oz (96.7 kg), SpO2 94 %.

## 2018-02-05 NOTE — PROGRESS NOTES
[facility] Lab Draw Note:  Arrived - 1411    Visit Vitals    /71 (BP 1 Location: Left arm, BP Patient Position: Sitting)    Pulse 96    Temp 99.1 °F (37.3 °C)    Resp 16    Wt 95.7 kg (211 lb)    SpO2 94%    BMI 30.28 kg/m2       Labs drawn peripherally from L AC arm -   Pt left at 1426 - Tolerated well. Pt denies any acute problems/changes. Discharged from [facility] ambulatory. No distress. See [facility] for pending lab results.

## 2018-02-12 NOTE — TELEPHONE ENCOUNTER
Feeling bad and having occasional fever  WBC 12.6 k last week    Will get a CT abdomen/pevis with contrast      Juan Antonio Adorno MD FACS

## 2018-02-12 NOTE — TELEPHONE ENCOUNTER
Spoke with patient scheduled CT of abd/pelvis 02/14/2018 at 1pm.  Informed him no solid foods 4hr prior. Patient will go by hospital tomorrow to  the barium to drink. He verbalized understanding.

## 2018-02-12 NOTE — TELEPHONE ENCOUNTER
Spoke with patient he stated since his surgery 12/28/2018 he was feeling soreness rt side abdomen. Had cortisone injection 01/22/2018 by PCP for his shoulder and starting feeling better all over until 1-2 weeks ago. He stated has Rt side abdomen soreness, sharp pain with dizziness. Also stated has soreness across abdomen. He will restart chemo 02/20/2018. Please advise.

## 2018-02-14 NOTE — TELEPHONE ENCOUNTER
Spoke with patient informed him to be NPO past MN and stop aspirin.  some one will contact  in am.Spoke with CT procedure will be scheduled in am.

## 2018-02-14 NOTE — TELEPHONE ENCOUNTER
CT suggests a liver abscess  Will arrange a CT guided drainage of a liver abscess  Plan an overnight stay    Eduardo Dewitt MD FACS

## 2018-02-15 PROBLEM — L02.91 ABSCESS: Status: ACTIVE | Noted: 2018-01-01

## 2018-02-15 NOTE — TELEPHONE ENCOUNTER
PA for CT guided cath/perc drain peritoneal/retroperi fluid scheduled for today 02/15/2018 pending [facility]#[id number]. Patient has valid referral to see provider scanned under media with exp date 03/01/2018.

## 2018-02-15 NOTE — DISCHARGE INSTRUCTIONS
[facility]  Special Procedures/Radiology Department      RADIOLOGIST: Dr. Seymour Clara:  2/15/2018       Drainage Discharge Instructions      Keep the dressing clean and dry. Do not remove the dressing for 72 hours. After 3 days, remove the dressing, and wash the area as you normally would. Watch for signs of infection:   Redness   Fever/Chills   Increased pain or tenderness at the site   Drainage  If this occurs, call your physician: ________________________________    Rest today    Be sure to follow up with your physician after 3 days    Resume previous diet and follow medication reconciliation form. You may have some discomfort at the insertion site, you can take Tylenol, avoid aspirin products, as they promote bleeding. Any questions, please call [phone] and ask to speak to the nurse on call.     Other:

## 2018-02-15 NOTE — TELEPHONE ENCOUNTER
Spoke with Donnie in CT she stated have patient come in at 11am outpatient reg. Spoke with patient informed him to continue to have nothing to eat or drink and arrive at [facility] outpatient reg at 11am today. He verbalized understanding.

## 2018-02-15 NOTE — IP AVS SNAPSHOT
[address] [place] [address] 
[phone] Patient: Sommer Lipoma MRN: [id number] HALEIGH:46/[id number] About your hospitalization You were admitted on:  February 15, 2018 You last received care in the:  [facility] [place] [facility] GENERAL SURGERY You were discharged on:  February 15, 2018 Why you were hospitalized Your primary diagnosis was:  Not on File Your diagnoses also included:  Abscess Follow-up Information None Your Scheduled Appointments Thursday February 15, 2018 12:40 PM EST  
US GUIDE NDL ASP  DRAIN with [address] [facility] Ultrasound [place] [address]) [facility]  
[phone] Aspirations DIET RESTICTIONS Please be NPO (nothing by mouth) for4 hours prior to procedure. GENERAL INSTRUCTIONS 1. Bring any non [facility] facility films/reports pertaining to the area being studied with you on the day of appointment. 2. Bring a list or bag of all medications you are currently taking, including over the counter medications. 3. A written order with a valid diagnosis and Physicians signature is required for all scheduled tests. 4. Blood thinners and platelet inhibitors must be stopped 3-5 days prior to procedure. Consult your ordering physician prior to stopping them. 5. Check in at registration 30 minutes before your appointment time unless you were instructed to do otherwise. 6. The procedure may last 1-1 ½ hours. You may be required to stay 4-6 hours after the procedure for observation. Patient should report to outpatient registration ([facility]) 30 minutes prior to the appointment time unless instructed otherwise. Monday February 19, 2018  9:20 AM EST  
ESTABLISHED PATIENT with Sung Cunningham MD  
[facility] [address] ([address]) [address] [place] [address], [address] [facility]  
[phone] Tuesday February 20, 2018  8:00 AM EST INFUSION 15 RI with ROSS INFUSION NURSE 1  
[place] ([place]) [address]  
[phone] Go to [facility], [address] [address], [address] [place] [address] Thursday February 22, 2018  4:00 PM EST INFUSION 15 RI with ROSS INFUSION NURSE 1  
[place] ([address]) [address]  
[phone] Go to [facility], [facility] [address], [address] [facility], [place] [address] Tuesday March 06, 2018  8:00 AM EST INFUSION 15 RI with CHAIR 2 [address]S [address] ([address]) [address]  
[phone] Go to [facility], [facility] [address], [address] [facility], [facility] [address] Thursday March 08, 2018  4:00 PM EST INFUSION 15 RI with ROSS INFUSION NURSE 1  
[place] ([address]) [address]  
[phone] Go to [facility], [facility] [address], [address] [facility], [place] [address] Discharge Orders None A check heber indicates which time of day the medication should be taken. My Medications Notice Cannot display discharge medications because the patient has not yet been admitted. Discharge Instructions [facility] Special Procedures/Radiology Department RADIOLOGIST: Dr. Eric Simpson DATE:  2/15/2018 Drainage Discharge Instructions Keep the dressing clean and dry. Do not remove the dressing for 72 hours. After 3 days, remove the dressing, and wash the area as you normally would. Watch for signs of infection: Redness Fever/Chills Increased pain or tenderness at the site Drainage If this occurs, call your physician: ________________________________ Rest today Be sure to follow up with your physician after 3 days Resume previous diet and follow medication reconciliation form. You may have some discomfort at the insertion site, you can take Tylenol, avoid aspirin products, as they promote bleeding. Any questions, please call [phone] and ask to speak to the nurse on call. Other:  
 
 
 
  
  
  
Introducing \[facility]\"" & HEALTH SERVICES! Dear Leonardo Oro: Thank you for requesting a Eloqua account. Our records indicate that you already have an active Eloqua account. You can access your account anytime at [email or web address] Did you know that you can access your hospital and ER discharge instructions at any time in Eloqua? You can also review all of your test results from your hospital stay or ER visit. Additional Information If you have questions, please visit the Frequently Asked Questions section of the Eloqua website at [email or web address] Remember, Eloqua is NOT to be used for urgent needs. For medical emergencies, dial 911. Now available from your iPhone and Android! Unresulted Labs-Please follow up with your PCP about these lab tests Order Current Status METABOLIC PANEL, COMPREHENSIVE In process Providers Seen During Your Hospitalization Provider Specialty Primary office phone Oneta Burkitt, MD General Surgery [phone] Immunizations Administered for This Admission Name Date Influenza Vaccine 10/1/2015 Influenza Vaccine Split 9/1/2012 ZZZ-RETIRED (DO NOT USE) Pneumococcal Vaccine (Unspecified Type) 12/11/2012 Your Primary Care Physician (PCP) Primary Care Physician Office Phone Office Fax Harley Love [phone] You are allergic to the following Allergen Reactions Plavix (Clopidogrel) Rash Hives and itching Recent Documentation Height Weight BMI Smoking Status 1.778 m 93 kg 29.41 kg/m2 Former Smoker Emergency Contacts Name Discharge Info Relation Home Work Jocelynn Reyes DISCHARGE CAREGIVER [3] Neighbor [4] [phone] Asia Martinez DISCHARGE CAREGIVER [3] Child [2] [phone] Patient Belongings The following personal items are in your possession at time of discharge: 
     Visual Aid: None Please provide this summary of care documentation to your next provider. Signatures-by signing, you are acknowledging that this After Visit Summary has been reviewed with you and you have received a copy. Patient Signature:  ____________________________________________________________ Date:  ____________________________________________________________  
  
Asa Can Provider Signature:  ____________________________________________________________ Date:  ____________________________________________________________

## 2018-02-15 NOTE — PROGRESS NOTES
Spiritual Care Assessment/Progress Notes    [facility] [id number]  [id number]    1950  75 y.o.  male    Patient Telephone Number: [phone] (home)   Scientology Affiliation: Gnosticism   Language: English   Extended Emergency Contact Information  Primary Emergency Contact: Cristal Garcia  Phone: [phone]  Relation: Neighbor  Secondary Emergency Contact: [address] Phone: [phone]  Relation: Child   Patient Active Problem List    Diagnosis Date Noted    Abscess 02/15/2018    Colon cancer metastasized to liver ([facility] 12/28/2017    Adenocarcinoma of cecum stage, IVb ([facility] 07/28/2017    SBO (small bowel obstruction) 07/25/2017    Small bowel obstruction 07/25/2017    Prostate cancer ([facility] 04/05/2017    Malignant neoplasm of retromolar area ([facility] 12/16/2015    S/P CABG x 3 12/06/2012    CAD (coronary artery disease) 11/20/2012        Date: 2/15/2018       Level of Scientology/Spiritual Activity:  []         Involved in mercy tradition/spiritual practice    []         Not involved in mercy tradition/spiritual practice  [x]         Spiritually oriented    []         Claims no spiritual orientation    []         seeking spiritual identity  []         Feels alienated from Sikhism practice/tradition  []         Feels angry about Sikhism practice/tradition  []         Spirituality/Sikhism tradition  a resource for coping at this time.   []         Not able to assess due to medical condition    Services Provided Today:  []         crisis intervention    []         reading Scriptures  [x]         spiritual assessment    []         prayer  [x]         empathic listening/emotional support  []         rites and rituals (cite in comments)  []         life review     []         Sikhism support  []         theological development   []         advocacy  []         ethical dialog     []         blessing  [x]         bereavement support [x]         support to family  []         anticipatory grief support   []         help with AMD  []         spiritual guidance    []         meditation      Spiritual Care Needs  [x]         Emotional Support  [x]         Spiritual/Lutheran Care  [x]         Loss/Adjustment  []         Advocacy/Referral                /Ethics  []         No needs expressed at               this time  []         Other: (note in               comments)  [address]  []         Follow up visits with               pt/family  []         Provide materials  []         Schedule sacraments  []         Contact Community               Clergy  [x]         Follow up as needed  []         Other: (note in               comments)     Comments:  responded to Code Blue in Ultrasound that resulted in death. Pt's 2 daughters, cousin, and friend came later to the hospital after the physician had notified them. Dareen Files was teary and grieving. They shared that they knew he was sick but this, they never expected and was not ready for. However, they shared that the pt seemed have made peace with God about his death. They mentioned that he was spiritual, in his own way, and that he was a strong believer of God though there was no Yarsanism he got involved in.  acknowledged their emotions and provided non-anxious presence and empathic listening. They shared stories of the pt and the relationship they had. Family and friend seemed to support one another well through this time. They all seemed to be grieving appropriately. Advised them of  availability and assured them of continued support as able and as needed/ desired. Licha Pierce M.S.   Spiritual Care Department  If needs rise please call [phone])

## 2018-02-15 NOTE — PROGRESS NOTES
.13:54  Code blue called, US room 3.    13:55  Arrived to [facility] [facility] room 3, CPR in progress. Anesthesiologist and Radiologist at bedside. 13:56  Narcan 0.5 mg administered. 13:57  CPR held for pulse check; organized rhythm on monitor briefly, CPR resumed. 13:59  Epinephrine 1 mg given. 14:00  CPR held; organized rhythm on monitor briefly. CPR resumed. 14:02  Pt given epinephrine 1 mg.    14:03  Pulse check; organized rhythm on monitor. CPR resumed. Pt intubated by Anesthesiologist.    14:05  Epinephrine 1 mg given, continuing with CPR. 14:08  Epinephrine 1 mg given. 14:09  Sodium bicarb given; v-fib on cardiac monitor, 200 J shock delivered, CPR resumed. 14:11  Pt given Epinephrine 1 mg    14:12  Pt given magnesium 2 mg, amiodarone 300 mg.    14:14  Pt given epinephrine 1 mg. Pulse check; v-fib on cardiac monitor, 200 J shock delivered, CPR resumed. 14:16  Pt given amiodarone 150 mg.    14:17  Pt given epinephrine 1 mg.    14:19  V-fib on cardiac monitor, 200 J shock delivered. 14:20   Pt given epinephrine 1 mg, magnesium 2 g, CPR resumed. 14:22  Pulse check; v-fib on cardiac monitor. 14:23  No cardiac activity on US. Time of death called.     Erik Lou DO

## 2018-02-15 NOTE — PROGRESS NOTES
Noted patient's eyes rolled back, patient did not respond verbally to name being called and did not arouse to touch. Patient assessed and was pulseless and apneic, CPR immediately initiated. Dr. Manny Riley performing chest compressions while RN assisting respirations with Bag valve mask. Code blue called by overhead page. Code blue team arrived and assisted with resuscitation efforts.

## 2018-02-15 NOTE — PROGRESS NOTES
Called to code blue in ultrasound room #3. CPR in progress. ER MD present conducting code. See code blue record for medications and interventions. Patient pronounced at 1423. Efforts terminated secondary to no return of spontaneous circulation. Supervisor attempting to locate family through a neighbor. Dr. Francesca Barrera present.

## 2018-02-15 NOTE — PROGRESS NOTES
Intubation Note    Called to bedside secondary to Code Blue. Patient pre-oxygenated with 100% oxygen. DVL x 2 with MAC 3. Esophageal intubation x 1 immediately recognized. 7.5 ETT taped and secured at 22 cm at the teeth.    + Bilateral BS, + Chest rise, + ETCO2 (very low). CXR pending.     Care turned over to covering Attending MD.

## 2018-02-15 NOTE — H&P
Interventional Radiology History and Physical (Inpatient)    Patient: Concepcion Adler 75 y.o. male     Referring Physician:  Jessica Conrad, *    Chief Complaint: No chief complaint on file. History of Present Illness: Subdiaphragmatic R lobe hepatic abscess. 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) - surgery    Cancer ([address]     Prostate Cancer    Cancer ([address]     colon    Carotid artery occlusion     reports 100% occlusion on one side and 55-70% blockage in the over side (this has not changed in at least 5-6 yrs)/has doppler study on neck 2 times a yr    Chronic obstructive pulmonary disease (HCC)     Chronic pain     r/t arthritis    Congestive heart failure ([address]     Hyperlipidemia     Hypertension     Ill-defined condition     high cholesterol    S/P CABG x 3 12/6/2012     Family History   Problem Relation Age of Onset    Heart Disease Mother     Hypertension Mother     Diabetes Mother     Psychiatric Disorder Father     Asthma Daughter     Anesth Problems Neg Hx      Social History     Social History    Marital status:      Spouse name: N/A    Number of children: N/A    Years of education: N/A     Occupational History    Not on file.      Social History Main Topics    Smoking status: Former Smoker     Packs/day: 1.50     Years: 50.00     Quit date: 12/6/2012    Smokeless tobacco: Never Used    Alcohol use 12.0 oz/week     20 Cans of beer per week      Comment: 2-3 beers daily    Drug use: No    Sexual activity: Not on file     Other Topics Concern    Not on file     Social History Narrative       Allergies: Allergies   Allergen Reactions    Plavix [Clopidogrel] Rash     Hives and itching        Current Medications:  Current Facility-Administered Medications   Medication Dose Route Frequency    fentaNYL citrate (PF) injection 100 mcg  100 mcg IntraVENous Multiple    midazolam (VERSED) injection 5 mg  5 mg IntraVENous Multiple    0.9% sodium chloride infusion  25 mL/hr IntraVENous CONTINUOUS     Current Outpatient Prescriptions   Medication Sig    loratadine (CLARITIN) 10 mg tablet Take 10 mg by mouth daily.  sodium chloride (OCEAN) 0.65 % nasal spray 2 Sprays by Both Nostrils route as needed for Congestion.  sildenafil (REVATIO) 20 mg tablet TAKE 1 TABLETS EVERY DAY    PROCTOSOL HC 2.5 % rectal cream USE 1 APPLICATION TOPICALLY  DAILY    lidocaine-prilocaine (EMLA) topical cream Apply  to affected area as needed for Pain.  aspirin 81 mg chewable tablet Take 81 mg by mouth daily.  atorvastatin (LIPITOR) 40 mg tablet Take 40 mg by mouth daily.  lisinopril (PRINIVIL, ZESTRIL) 5 mg tablet Take 5 mg by mouth daily.  MULTIVITAMIN PO Take 1 Tab by mouth daily. For men 48 +. Takes one po every day.  metoprolol succinate (TOPROL-XL) 25 mg XL tablet Take 25 mg by mouth daily.  acetaminophen (TYLENOL EXTRA STRENGTH) 500 mg tablet Take 500 mg by mouth as needed for Pain. Physical Exam:  Blood pressure 135/90, pulse 93, temperature 98.1 °F (36.7 °C), resp. rate 20, height 5' 10\" (1.778 m), weight 93 kg (205 lb), SpO2 98 %. GENERAL: alert, cooperative, no distress, appears stated age, LUNG: clear to auscultation bilaterally, except R lung base.  Suprisingly, he is able to take a deep inspiration w/o R diaph/chest pain; HEART: regular rate and rhythm    Findings/Diagnosis: Conscious sedation (Versed and fentanyl) for hepatic abscess drainage    Alerts:    Hospital Problems  Date Reviewed: 1/19/2018          Codes Class Noted POA    Abscess ICD-10-CM: L02.91  ICD-9-CM: 682.9  2/15/2018 Unknown              Laboratory:      Recent Labs      02/15/18   1150   HGB  11.0*   HCT  32.8*   WBC  6.3   PLT  339         Plan of Care/Planned Procedure:  Risks, benefits, and alternatives reviewed with patient and he agrees to proceed with the procedure. Full dictated report to follow.

## 2018-02-15 NOTE — PROGRESS NOTES
All lines and airway removed from patient. Patient transported to room 1111 for family viewing. Report given to Floor RN.  Death certificate filled out by Jodee Ferreira and chart given to Floor, RN

## 2018-02-20 ENCOUNTER — APPOINTMENT (OUTPATIENT)
Dept: INFUSION THERAPY | Age: 68
End: 2018-02-20
Payer: MEDICARE

## 2018-02-22 ENCOUNTER — APPOINTMENT (OUTPATIENT)
Dept: INFUSION THERAPY | Age: 68
End: 2018-02-22
Payer: MEDICARE

## 2018-03-06 ENCOUNTER — APPOINTMENT (OUTPATIENT)
Dept: INFUSION THERAPY | Age: 68
End: 2018-03-06

## 2018-03-08 ENCOUNTER — APPOINTMENT (OUTPATIENT)
Dept: INFUSION THERAPY | Age: 68
End: 2018-03-08

## 2022-03-07 NOTE — ED NOTES
Pt alert , Dr. Stephanie Nowak completed flavia at this time per Burgess Zoran ZHANG. DISPLAY PLAN FREE TEXT
